# Patient Record
Sex: FEMALE | Race: BLACK OR AFRICAN AMERICAN | NOT HISPANIC OR LATINO | Employment: UNEMPLOYED | URBAN - METROPOLITAN AREA
[De-identification: names, ages, dates, MRNs, and addresses within clinical notes are randomized per-mention and may not be internally consistent; named-entity substitution may affect disease eponyms.]

---

## 2022-07-20 ENCOUNTER — HOSPITAL ENCOUNTER (EMERGENCY)
Facility: CLINIC | Age: 22
Discharge: HOME | End: 2022-07-20
Attending: PHYSICIAN ASSISTANT | Admitting: PHYSICIAN ASSISTANT
Payer: COMMERCIAL

## 2022-07-20 VITALS
HEART RATE: 87 BPM | OXYGEN SATURATION: 96 % | WEIGHT: 142.2 LBS | RESPIRATION RATE: 18 BRPM | DIASTOLIC BLOOD PRESSURE: 79 MMHG | TEMPERATURE: 97.8 F | SYSTOLIC BLOOD PRESSURE: 122 MMHG

## 2022-07-20 DIAGNOSIS — U07.1 INFECTION DUE TO 2019 NOVEL CORONAVIRUS: ICD-10-CM

## 2022-07-20 LAB
FLUAV RNA SPEC QL NAA+PROBE: NEGATIVE
FLUBV RNA RESP QL NAA+PROBE: NEGATIVE
RSV RNA SPEC NAA+PROBE: NEGATIVE
SARS-COV-2 RNA RESP QL NAA+PROBE: POSITIVE

## 2022-07-20 PROCEDURE — 87637 SARSCOV2&INF A&B&RSV AMP PRB: CPT | Performed by: PHYSICIAN ASSISTANT

## 2022-07-20 PROCEDURE — 99283 EMERGENCY DEPT VISIT LOW MDM: CPT | Mod: CS

## 2022-07-20 ASSESSMENT — ENCOUNTER SYMPTOMS
WHEEZING: 0
SHORTNESS OF BREATH: 0
FEVER: 0
COUGH: 1
VOMITING: 0
HEADACHES: 1
NAUSEA: 0

## 2022-07-21 NOTE — ED PROVIDER NOTES
History     Chief Complaint:  Covid 19 Testing     HPI   Nelsy Quinones is a 21 year old female who presents with couple of days with cough and intermittent headache.  No fevers.  No shortness of breath.  No loss of sense of taste or smell.  She has been exposed to COVID-19.  She tested positive today with a home test.  She needs another test for her work.    ROS:  Review of Systems   Constitutional: Negative for fever.   HENT: Positive for congestion.    Respiratory: Positive for cough. Negative for shortness of breath and wheezing.    Gastrointestinal: Negative for nausea and vomiting.   Neurological: Positive for headaches.   All other systems reviewed and are negative.    Allergies:  No Known Allergies     Medications:      Keppra    Past Medical History:    Hx of [facility]    Social History:     Presents with her son to the ED    Physical Exam     Patient Vitals for the past 24 hrs:   BP Temp Temp src Pulse Resp SpO2 Weight   07/20/22 1956 122/79 97.8  F (36.6  C) Temporal 87 18 96 % 64.5 kg (142 lb 3.2 oz)        Physical Exam  General: Vitals reviewed  Neuro: Normal Mentation  Psych: Normal affect  Eyes: Nonicteric, noninjected, normal range of motion, PERRLA  Nose: mild congestion  Ears: Bilateral tympanic membranes are pearly gray without erythema, or bulging.  Canals are free of discharge.  TMs are intact.  Oropharynx: No erythema of the back of the throat, uvula midline, moist mucous membranes, no tonsillitis, no trismus.  Lungs:Bilateral breath sounds, Clear to auscultation, no wheezing, rhonchi, Rales, crackles.  Normal respiratory excursion.  Heart:Regular rate and rhythm without murmurs, rubs, gallops  Skin: Normal moisture and temperature.      Emergency Department Course     Imaging:  No orders to display      Laboratory:  Labs Ordered and Resulted from Time of ED Arrival to Time of ED Departure - No data to display     Procedures     Emergency Department Course:         Reviewed:  I  reviewed nursing notes, vitals and past medical history    Assessments/Consults:          Interventions:  Medications - No data to display     Disposition:  The patient was discharged to home.     Impression & Plan    CMS Diagnoses: None    Covid-19  Nelsy Quinones was evaluated during a global COVID-19 pandemic, which necessitated consideration that the patient might be at risk for infection with the SARS-CoV-2 virus that causes COVID-19.   Applicable protocols for evaluation were followed during the patient's care.   COVID-19 was considered as part of the patient's evaluation. The plan for testing is:  a test was obtained during this visit.    Medical Decision Making:    This is a very pleasant and well appearing 21 year old female who presents with history and exam consistent with acute upper respiratory infection. Home test already positive for COVID 19.  There is no evidence of acute otitis media.  There is no significant tachypnea, increased work of breathing, focal exam findings, or persistent symptoms to suggest pneumonia; I do not believe imaging is indicated at this time.  The patient is well-hydrated, well-appearing, and I believe is safe for discharge with plan for supportive care.  As there is no bacterial infection identified, no antibiotic will be prescribed at this time.  The patient may take Tylenol or ibuprofen for pain and fever, and I discussed supportive measures such as decongestants.  Return if increasing pain, fever, difficulty breathing, vomiting, or any other concerns.  Follow-up with primary physician in 3-5 days.      My impression of today's diagnosis is:     ICD-10-CM    1. Infection due to 2019 novel coronavirus  U07.1        Discharge Medications:  New Prescriptions    No medications on file        7/20/2022   Nolan Robert PA-C Kruger, Jacob C, PA-C  07/21/22 1037

## 2022-07-21 NOTE — ED NOTES
Respiratory (Adult) - Airway WDL:  (pt comes in to have a covid test after having a positive covid test at home. congestion, coughm sob, weakness, pt works at a .)

## 2022-07-31 ENCOUNTER — HOSPITAL ENCOUNTER (EMERGENCY)
Facility: CLINIC | Age: 22
Discharge: PSYCHIATRIC OR SUBSTANCE USE FACILITY | End: 2022-08-02
Attending: EMERGENCY MEDICINE | Admitting: EMERGENCY MEDICINE
Payer: COMMERCIAL

## 2022-07-31 DIAGNOSIS — R45.851 SUICIDAL IDEATION: ICD-10-CM

## 2022-07-31 DIAGNOSIS — F32.A DEPRESSION, UNSPECIFIED DEPRESSION TYPE: ICD-10-CM

## 2022-07-31 LAB
ALBUMIN SERPL BCG-MCNC: 4.7 G/DL (ref 3.5–5.2)
ALP SERPL-CCNC: 61 U/L (ref 35–104)
ALT SERPL W P-5'-P-CCNC: 11 U/L (ref 10–35)
ANION GAP SERPL CALCULATED.3IONS-SCNC: 10 MMOL/L (ref 7–15)
APAP SERPL-MCNC: 45.7 UG/ML (ref 10–30)
AST SERPL W P-5'-P-CCNC: 18 U/L (ref 10–35)
BASOPHILS # BLD AUTO: 0 10E3/UL (ref 0–0.2)
BASOPHILS NFR BLD AUTO: 1 %
BILIRUB SERPL-MCNC: 0.4 MG/DL
BUN SERPL-MCNC: 12.4 MG/DL (ref 6–20)
CALCIUM SERPL-MCNC: 9.3 MG/DL (ref 8.6–10)
CHLORIDE SERPL-SCNC: 104 MMOL/L (ref 98–107)
CREAT SERPL-MCNC: 0.76 MG/DL (ref 0.51–0.95)
DEPRECATED HCO3 PLAS-SCNC: 27 MMOL/L (ref 22–29)
EOSINOPHIL # BLD AUTO: 0.1 10E3/UL (ref 0–0.7)
EOSINOPHIL NFR BLD AUTO: 2 %
ERYTHROCYTE [DISTWIDTH] IN BLOOD BY AUTOMATED COUNT: 14.8 % (ref 10–15)
GFR SERPL CREATININE-BSD FRML MDRD: >90 ML/MIN/1.73M2
GLUCOSE SERPL-MCNC: 102 MG/DL (ref 70–99)
HCG SERPL QL: NEGATIVE
HCT VFR BLD AUTO: 39.8 % (ref 35–47)
HGB BLD-MCNC: 11.8 G/DL (ref 11.7–15.7)
IMM GRANULOCYTES # BLD: 0 10E3/UL
IMM GRANULOCYTES NFR BLD: 0 %
LYMPHOCYTES # BLD AUTO: 1.8 10E3/UL (ref 0.8–5.3)
LYMPHOCYTES NFR BLD AUTO: 34 %
MCH RBC QN AUTO: 24.9 PG (ref 26.5–33)
MCHC RBC AUTO-ENTMCNC: 29.6 G/DL (ref 31.5–36.5)
MCV RBC AUTO: 84 FL (ref 78–100)
MONOCYTES # BLD AUTO: 0.5 10E3/UL (ref 0–1.3)
MONOCYTES NFR BLD AUTO: 10 %
NEUTROPHILS # BLD AUTO: 2.8 10E3/UL (ref 1.6–8.3)
NEUTROPHILS NFR BLD AUTO: 53 %
NRBC # BLD AUTO: 0 10E3/UL
NRBC BLD AUTO-RTO: 0 /100
PLAT MORPH BLD: NORMAL
PLATELET # BLD AUTO: 152 10E3/UL (ref 150–450)
POTASSIUM SERPL-SCNC: 3.5 MMOL/L (ref 3.4–5.3)
PROT SERPL-MCNC: 7.9 G/DL (ref 6.4–8.3)
RBC # BLD AUTO: 4.73 10E6/UL (ref 3.8–5.2)
RBC MORPH BLD: NORMAL
SODIUM SERPL-SCNC: 141 MMOL/L (ref 136–145)
WBC # BLD AUTO: 5.3 10E3/UL (ref 4–11)

## 2022-07-31 PROCEDURE — 80179 DRUG ASSAY SALICYLATE: CPT | Performed by: EMERGENCY MEDICINE

## 2022-07-31 PROCEDURE — 80053 COMPREHEN METABOLIC PANEL: CPT | Performed by: EMERGENCY MEDICINE

## 2022-07-31 PROCEDURE — 85025 COMPLETE CBC W/AUTO DIFF WBC: CPT | Performed by: EMERGENCY MEDICINE

## 2022-07-31 PROCEDURE — 93005 ELECTROCARDIOGRAM TRACING: CPT

## 2022-07-31 PROCEDURE — 99285 EMERGENCY DEPT VISIT HI MDM: CPT | Mod: 25

## 2022-07-31 PROCEDURE — 250N000013 HC RX MED GY IP 250 OP 250 PS 637: Performed by: EMERGENCY MEDICINE

## 2022-07-31 PROCEDURE — 80143 DRUG ASSAY ACETAMINOPHEN: CPT | Performed by: EMERGENCY MEDICINE

## 2022-07-31 PROCEDURE — 36415 COLL VENOUS BLD VENIPUNCTURE: CPT | Performed by: EMERGENCY MEDICINE

## 2022-07-31 PROCEDURE — 84703 CHORIONIC GONADOTROPIN ASSAY: CPT | Performed by: EMERGENCY MEDICINE

## 2022-07-31 RX ADMIN — ACTIVATED CHARCOAL 50 G: 208 SUSPENSION ORAL at 22:46

## 2022-08-01 ENCOUNTER — TELEPHONE (OUTPATIENT)
Dept: BEHAVIORAL HEALTH | Facility: CLINIC | Age: 22
End: 2022-08-01

## 2022-08-01 LAB
AMPHETAMINES UR QL SCN: ABNORMAL
APAP SERPL-MCNC: 39.9 UG/ML (ref 10–30)
BARBITURATES UR QL SCN: ABNORMAL
BENZODIAZ UR QL SCN: ABNORMAL
BZE UR QL SCN: ABNORMAL
CANNABINOIDS UR QL SCN: ABNORMAL
OPIATES UR QL SCN: ABNORMAL
SALICYLATES SERPL-MCNC: <0.5 MG/DL
SALICYLATES SERPL-MCNC: <0.5 MG/DL

## 2022-08-01 PROCEDURE — 36415 COLL VENOUS BLD VENIPUNCTURE: CPT | Performed by: EMERGENCY MEDICINE

## 2022-08-01 PROCEDURE — 250N000013 HC RX MED GY IP 250 OP 250 PS 637: Performed by: EMERGENCY MEDICINE

## 2022-08-01 PROCEDURE — 90791 PSYCH DIAGNOSTIC EVALUATION: CPT

## 2022-08-01 PROCEDURE — 80307 DRUG TEST PRSMV CHEM ANLYZR: CPT | Performed by: EMERGENCY MEDICINE

## 2022-08-01 PROCEDURE — 80179 DRUG ASSAY SALICYLATE: CPT | Performed by: EMERGENCY MEDICINE

## 2022-08-01 PROCEDURE — 80143 DRUG ASSAY ACETAMINOPHEN: CPT | Performed by: EMERGENCY MEDICINE

## 2022-08-01 RX ORDER — LEVETIRACETAM 500 MG/1
1000 TABLET ORAL 2 TIMES DAILY
Status: DISCONTINUED | OUTPATIENT
Start: 2022-08-01 | End: 2022-08-02 | Stop reason: HOSPADM

## 2022-08-01 RX ORDER — HYDROXYZINE HYDROCHLORIDE 25 MG/1
25 TABLET, FILM COATED ORAL EVERY 4 HOURS PRN
Status: DISCONTINUED | OUTPATIENT
Start: 2022-08-01 | End: 2022-08-02 | Stop reason: HOSPADM

## 2022-08-01 RX ORDER — OLANZAPINE 5 MG/1
10 TABLET, ORALLY DISINTEGRATING ORAL 2 TIMES DAILY PRN
Status: DISCONTINUED | OUTPATIENT
Start: 2022-08-01 | End: 2022-08-02 | Stop reason: HOSPADM

## 2022-08-01 RX ORDER — IBUPROFEN 600 MG/1
600 TABLET, FILM COATED ORAL EVERY 6 HOURS PRN
Status: DISCONTINUED | OUTPATIENT
Start: 2022-08-01 | End: 2022-08-02 | Stop reason: HOSPADM

## 2022-08-01 RX ORDER — ONDANSETRON 2 MG/ML
4 INJECTION INTRAMUSCULAR; INTRAVENOUS ONCE
Status: DISCONTINUED | OUTPATIENT
Start: 2022-08-01 | End: 2022-08-02 | Stop reason: HOSPADM

## 2022-08-01 RX ORDER — LEVETIRACETAM 500 MG/1
1000 TABLET ORAL 2 TIMES DAILY
COMMUNITY

## 2022-08-01 RX ORDER — LORAZEPAM 1 MG/1
1 TABLET ORAL EVERY 8 HOURS PRN
Status: DISCONTINUED | OUTPATIENT
Start: 2022-08-01 | End: 2022-08-02 | Stop reason: HOSPADM

## 2022-08-01 RX ADMIN — LEVETIRACETAM 1000 MG: 500 TABLET, FILM COATED ORAL at 22:23

## 2022-08-01 RX ADMIN — LEVETIRACETAM 1000 MG: 500 TABLET, FILM COATED ORAL at 09:43

## 2022-08-01 ASSESSMENT — ENCOUNTER SYMPTOMS: DYSPHORIC MOOD: 1

## 2022-08-01 NOTE — ED NOTES
Pt changed into behavioral scrubs. Clothing and shoe bagged and placed in pt belonging bag (1). Bag placed in lock room in green pod. Pt request if she can have her phone and head phones to listen to music. Writer informs pt that writer will allow the pt to do so as long as the pt is willing to cooperative with staff and it does not cause any disruption.     Pt is agreeable to plan.   at bedside.

## 2022-08-01 NOTE — TELEPHONE ENCOUNTER
S:  8 AM  Call from DEC  at [facility] ED requesting IP MH placement for a 21 YO F    B:  Hx of MDD, BIB EMS  after intentional SA via ingestion of 11 tabs of 500mg tylenol. 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 Denies previous SA.  No previous psychiatric hosp. no prior hx of OP MH services   Reports No CD hx or use    Utox-POS Cannibaboids  Acetamin level 8-1-22 1:39AM  39.9(H)  VSS    Medical:  COVID recovered, seizures-takes Keppra well managed no seizures for 2-3 years    A:  Voluntary-[facility]    R:  Patient cleared and ready for behavioral bed placement: Yes     R:  8:30 AM  Call to IP-M left requesting call back re:  COVID status clarification.    R:  9:20 AM  Call from IP, patient flagged changed to COVID recovered.

## 2022-08-01 NOTE — ED NOTES
"Pt asking for food and water. States that she is feeling nauseated after drinking activated charcoal. Dr. Soares made aware and Zofran ordered for the pt.     Writer offers Zofran for the pt. Pt states, \"I haven't eaten anything yet this morning. I think that can also be what is causing my stomach to hurt and feeling nauseous.\". MD made aware and OK for pt to eat/drink.    Cold lunch box given to the pt.   at bedside.  "

## 2022-08-01 NOTE — ED PROVIDER NOTES
"  History   Chief Complaint:  Suicide Attempt and Ingestion       The history is provided by the patient. History limited by: Psychiatric disorder.      Nelsy Quinones is a 22 year old female  who presents after taking 11 tablets of 500 mg tylenol at around 2130. She did not take anything else besides tylenol. Patient has been feeling down/ depressed for more than a week. She is supposed to take Keppra twice a day and took it today. She denies any history of asthma or diabetes.    She reports being in quarantine for COVID for 1.5 weeks.  She felt she did not have anything to distract her and because she was feeling depressed, she took the medications. She called her mother after taking the meds and also told sister who she lives with. EMS was then called. The patient notes she has a 7 month old son who is with her sister right now.      Review of Systems   Unable to perform ROS: Psychiatric disorder   Psychiatric/Behavioral: Positive for dysphoric mood and suicidal ideas.       Allergies:  The patient denies any known allergies.    Medications:  Keppra    Past Medical History:     The patient denies any prior medical history.    Past Surgical History:    The patient denies any prior surgical history.    Family History:    The patient denies any prior family history.    Social History:  Patient came from home.  Patient is unaccompanied in the ED.      Physical Exam     Patient Vitals for the past 24 hrs:   BP Temp Temp src Pulse Resp SpO2 Height   08/01/22 0045 -- -- -- -- 22 100 % --   08/01/22 0015 -- -- -- 68 16 98 % --   07/31/22 2345 -- -- -- 73 16 99 % --   07/31/22 2215 -- -- -- -- -- 99 % --   07/31/22 2206 115/82 99.1  F (37.3  C) Oral 87 18 99 % 1.626 m (5' 4\")       Physical Exam    Constitutional: Vital signs reviewed as above.   Head: No external signs of trauma noted.  Eyes: Pupils are equal, round, and reactive to light.   Neck: No JVD noted  Cardiovascular: Normal rate, regular rhythm and " normal heart sounds.  No murmur heard. Equal B/L peripheral pulses.  Pulmonary/Chest: Effort normal and breath sounds normal. No respiratory distress. Patient has no wheezes. Patient has no rales.   Gastrointestinal: Soft. There is no tenderness on my exam.   Musculoskeletal/Extremities: No edema noted. Normal tone.  Neurological: Patient is alert and oriented to person, place, and time.   Skin: Skin is warm and dry. There is no diaphoresis noted.   Psychiatric: The patient appears tearful and depressed with a flat affect and poor eye contact and interaction. She is very soft spoken, but mostly nods or shakes her head. She will not answer some questions.            Emergency Department Course   ECG  ECG obtained at 2306, ECG read at 2322  Sinus bradycardia with sinus arrhythmia  Nonspecific T wave abnormality  Abnormal ECG  Rate 59 bpm. WY interval 136 ms. QRS duration 92 ms. QT/QTc 416/411 ms. P-R-T axes 51 56 41.    Laboratory:  Labs Ordered and Resulted from Time of ED Arrival to Time of ED Departure   COMPREHENSIVE METABOLIC PANEL - Abnormal       Result Value    Sodium 141      Potassium 3.5      Creatinine 0.76      Urea Nitrogen 12.4      Chloride 104      Carbon Dioxide (CO2) 27      Anion Gap 10      Glucose 102 (*)     Calcium 9.3      Protein Total 7.9      Albumin 4.7      Bilirubin Total 0.4      Alkaline Phosphatase 61      AST 18      ALT 11      GFR Estimate >90     ACETAMINOPHEN LEVEL - Abnormal    Acetaminophen 45.7 (*)    CBC WITH PLATELETS AND DIFFERENTIAL - Abnormal    WBC Count 5.3      RBC Count 4.73      Hemoglobin 11.8      Hematocrit 39.8      MCV 84      MCH 24.9 (*)     MCHC 29.6 (*)     RDW 14.8      Platelet Count 152      % Neutrophils 53      % Lymphocytes 34      % Monocytes 10      % Eosinophils 2      % Basophils 1      % Immature Granulocytes 0      NRBCs per 100 WBC 0      Absolute Neutrophils 2.8      Absolute Lymphocytes 1.8      Absolute Monocytes 0.5      Absolute Eosinophils  0.1      Absolute Basophils 0.0      Absolute Immature Granulocytes 0.0      Absolute NRBCs 0.0     ACETAMINOPHEN LEVEL - Abnormal    Acetaminophen 39.9 (*)    DRUG ABUSE SCREEN 1 URINE (ED) - Abnormal    Amphetamines Urine Screen Negative      Barbituates Urine Screen Negative      Benzodiazepine Urine Screen Negative      Cannabinoids Urine Screen Positive (*)     Cocaine Urine Screen Negative      Opiates Urine Screen Negative     HCG QUALITATIVE PREGNANCY - Normal    hCG Serum Qualitative Negative     SALICYLATE LEVEL - Normal    Salicylate <0.5     SALICYLATE LEVEL - Normal    Salicylate <0.5     RBC AND PLATELET MORPHOLOGY    Platelet Assessment        Value: Automated Count Confirmed. Platelet morphology is normal.    RBC Morphology Confirmed RBC Indices          Procedures      Emergency Department Course:    Mental Health Risk Assessment      PSS-3    Date and Time Over the past 2 weeks have you felt down, depressed, or hopeless? Over the past 2 weeks have you had thoughts of killing yourself? Have you ever attempted to kill yourself? When did this last happen? User   07/31/22 2211 yes yes yes within the last 24 hours (including today) CT      C-SSRS ([place])    Date and Time Q1 Wished to be Dead (Past Month) Q2 Suicidal Thoughts (Past Month) Q3 Suicidal Thought Method Q4 Suicidal Intent without Specific Plan Q5 Suicide Intent with Specific Plan Q6 Suicide Behavior (Lifetime) Within the Past 3 Months? RETIRED: Level of Risk per Screen Screening Not Complete User   07/31/22 2211 yes yes yes no yes no -- -- -- CT               Mental health assessment completed by DEC        Reviewed:  I reviewed nursing notes, vitals, past medical history and Care Everywhere    Assessments/consults:  ED Course as of 08/01/22 0603   Sun Jul 31, 2022 2209 Obtained history and examined the patient as noted above.     2216 D/W [place] PCC. Get Tylenol level at 0130, if greater than 150, treat.   Mon Aug 01, 2022   0550 Rechecked.        Interventions:  2246 Charcoal 50 g  PO    Disposition:  Care of the patient was transferred to my colleague Dr. Dong pending DEC assessment.     Impression & Plan     CMS Diagnoses: None    Medical Decision Making:    This 22-year-old female patient presents to the ED after a Tylenol ingestion at approximately 9:30 PM.  Please see the HPI and exam for specifics.  The patient was tearful and did not want to interact very much.      A broad differential was considered and coingestants were considered along with Tylenol.  Tylenol and aspirin levels were obtained 4 hours after the reported ingestion.  Other laboratories were also obtained. Results are, fortunately, notable for a nonelevated Tylenol level.     The patient was eventually felt to be medically stable and was able to be evaluated by mental health. She was signed over to my colleague, Dr. Grider, for disposition pending DEC evaluation.       Diagnosis:    ICD-10-CM    1. Suicidal ideation  R45.851    2. Depression, unspecified depression type  F32.A        Discharge Medications:  New Prescriptions    No medications on file       Scribe Disclosure:  I, [place], am serving as a scribe at 10:09 PM on 7/31/2022 to document services personally performed by Kirill Soares DO based on my observations and the provider's statements to me.            Kirill Soares DO  08/01/22 0603

## 2022-08-01 NOTE — SAFE
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 Guardianship paperwork is not required.    This patient is a child/adolescent: No    This patient has additional special visitor precautions: No    Updated care team: Yes:     For additional details see full LMHP assessment.       Álvaro Yousif, LICSW

## 2022-08-01 NOTE — ED NOTES
Pt is ok for giving out information/update to her mother, Soheila if she calls.    Soheila's number - [phone]

## 2022-08-01 NOTE — ED TRIAGE NOTES
Pt arrives via EMS. EMS reports that pt took 11 pills of tylenol today at 2120. Pt comes with EMS to ER voluntarily.      Triage Assessment     Row Name 07/31/22 2202       Triage Assessment (Adult)    Airway WDL WDL       Respiratory WDL    Respiratory WDL WDL       Skin Circulation/Temperature WDL    Skin Circulation/Temperature WDL WDL       Cardiac WDL    Cardiac WDL WDL       Peripheral/Neurovascular WDL    Peripheral Neurovascular WDL WDL       Cognitive/Neuro/Behavioral WDL    Cognitive/Neuro/Behavioral WDL X;mood/behavior    Orientation oriented x 4    Mood/Behavior calm;cooperative;flat affect;other (see comments)  Refusing to answer questions at times. Nods yes or no.       Shirley Coma Scale    Best Eye Response 4-->(E4) spontaneous    Best Motor Response 6-->(M6) obeys commands    Best Verbal Response 5-->(V5) oriented    Shirley Coma Scale Score 15

## 2022-08-01 NOTE — ED NOTES
"Upon arrival pt was unwilling to talk/answer questions from staff/MD. Pt only nodding head to yes and no questions. Writer explains to pt that for writer and ER staff to be able to help the pt we need information of what happened that led to pt taking the tylenol tablets. Writer also lets the pt know that this is a safe place to talk about her feelings and that all the staff here wants to help her. Pt tearful but nods her head yes.    Writer noted that pt had wrist bands on from going to an event and asked pt where the pt went. Pt starts sharing with writer that she celebrated her birthday this past Saturday. 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    Writer goes more indebt with the pt about what led her to today's decision.    Pt reports to writer that she has been stressed with work and life in general. Pt states that she and her sister live together in an apartment with pt's 7 month old son. Pt states that she helps to pay the bills. Pt reports that she and her son both had covid on 7/20. Pt states she was on quarantine due to having covid. This led her to become isolated and \"had too much time to think about stress in my life and other stuff in general\". She is usually able to keep herself occupied with work and then bathing her baby at night. Pt admits that she has been thinking about this plan for awhile but never acted on it until today. Pt also shares that she is suppose to go back to work this Monday.    Pt reports that after she took the tylenols she called her mother to let her know what happened. Pt's mother and sister called for EMS and pt was transported here.    Pt is pleasant, calm, and cooperative. Pt able to smile and joke around with writer and  at bedside.    "

## 2022-08-01 NOTE — CONSULTS
Diagnostic Evaluation Consultation  Crisis Assessment    Patient was assessed: remote  Patient location: [facility]  Was a release of information signed: No. Reason: declined      Referral Data and Chief Complaint  Patient  is a 22 year old, who uses she/her pronouns, and presents to the ED alone. Patient is referred to the ED by self. Patient is presenting to the ED for the following concerns: attempted overdose via tylenol ingestion.      Informed Consent and Assessment Methods     Patient is her own guardian. Writer met with patient and explained the crisis assessment process, including applicable information disclosures and limits to confidentiality, assessed understanding of the process, and obtained consent to proceed with the assessment. Patient was observed to be able to participate in the assessment as evidenced by her ability to speak. Assessment methods included conducting a formal interview with patient, review of medical records, collaboration with medical staff, and obtaining relevant collateral information from family and community providers when available..     Over the course of this crisis assessment provided reassurance, offered validation and engaged patient in problem solving and disposition planning. Patient's response to interventions was positive     Summary of Patient Situation  Patient presents as being alert, orientated, and calm throughout. She states that she has been experiencing increased depression for the past month due to multiple stressors related to being a single mother, having her hours reduced at work, financial struggles, and a potential eviction. This suicide attempt appears to be impulsive, and patient shared that she has limited supports beyond her sister, who she lives with.     Brief Psychosocial History  Lives with sister, works for a  center, and denies having any legal, cultural or spiritual factors impacting her mental health at this time.     Significant  Clinical History  Patient states she has suffered from depression intermittently since her late teens, and typically these sx have presented as being flat affect, low motivation and sadness. She denies having any prior inpatient or outpatient providers/supports as well as commitments.      Collateral Information  DEC attempted to call patient's sister and left a message     Risk Assessment  ESS-6  1.a. Over the past 2 weeks, have you had thoughts of killing yourself? Yes  1.b. Have you ever attempted to kill yourself and, if yes, when did this last happen? Yes earlier today   2. Recent or current suicide plan? Yes overdose   3. Recent or current intent to act on ideation? Yes  4. Lifetime psychiatric hospitalization? No  5. Pattern of excessive substance use? No  6. Current irritability, agitation, or aggression? No  Scoring note: BOTH 1a and 1b must be yes for it to score 1 point, if both are not yes it is zero. All others are 1 point per number. If all questions 1a/1b - 6 are no, risk is negligible. If one of 1a/1b is yes, then risk is mild. If either question 2 or 3, but not both, is yes, then risk is automatically moderate regardless of total score. If both 2 and 3 are yes, risk is automatically high regardless of total score.      Score: 3, high risk      Does the patient have access to lethal means? Yes - describe OTC medications     Does the patient engage in non-suicidal self-injurious behavior (NSSI/SIB)? no     Does the patient have thoughts of harming others? No     Is the patient engaging in sexually inappropriate behavior?  no        Current Substance Abuse     Is there recent substance abuse? no     Was a urine drug screen or blood alcohol level obtained: Yes pending       Mental Status Exam     Affect: Flat   Appearance: Appropriate    Attention Span/Concentration: Attentive  Eye Contact: Variable   Fund of Knowledge: Appropriate    Language /Speech Content: Fluent   Language /Speech Volume: Soft     Language /Speech Rate/Productions: Pressured    Recent Memory: Variable   Remote Memory: Variable   Mood: Apathetic, Depressed and Sad    Orientation to Person: Yes    Orientation to Place: Yes   Orientation to Time of Day: Yes    Orientation to Date: Yes    Situation (Do they understand why they are here?): Yes    Psychomotor Behavior: Underactive    Thought Content: Suicidal   Thought Form: Goal Directed      History of commitment: No    Medication    Psychotropic medications: No  Medication changes made in the last two weeks: No       Current Care Team    Primary Care Provider: No  Psychiatrist: No  Therapist: No  : No  CTSS or ARMHS: No  ACT Team: No  Other: No      Diagnosis    296.33 (F33.2) Major Depressive Disorder, Recurrent Episode, Severe _ and With anxious distress     Clinical Summary and Substantiation of Recommendations    Patient is a 22 yr old female who seeks medical care following a suicide attempt via ingestion. She is medically cleared at this time and endorses multiple stressors contributing to this attempt. She offers flat affect, intermittent eye contact, and inability to contract for safety at this time due to her current state. She also does not offer explicit remorse for this attempt, and as a mother of a seven month old baby with minimal supports, outpatient planning at this time was agreed upon as not being a viable option with the medical team.  Disposition    Recommended disposition: Inpatient Mental Health       Reviewed case and recommendations with attending provider. Attending Name: Dr. Grider       Attending concurs with disposition: Yes       Patient concurs with disposition: Yes, but would like to consult with her mother and sister     Guardian concurs with disposition: No      Final disposition: Inpatient mental health .     Inpatient Details (if applicable):  Is patient admitted voluntarily:Yes, at this time      Patient aware of potential for transfer if there  is not appropriate placement? Yes       [facility]      Behavioral Intake Notified? Yes: Date: 8/1/22 Time: 8:00am    Assessment Details    Patient interview started at: 7:45am and completed at: 8:30am.     Total duration spent on the patient case in minutes: 1.50 hrs      CPT code(s) utilized: 36286 - Psychotherapy for Crisis - 60 (30-74*) min       Álvaro Yousif, LICSW, MSW, LICSW  DEC - Triage & Transition Services

## 2022-08-01 NOTE — ED PROVIDER NOTES
Patient signed out to me by Dr. Soares.  Please see initial provider note for full details.  In brief, patient presents to the ER after taking 11 tablets of 500 mg Tylenol at 2130.  She is status post activated charcoal.  Toxic/metabolic work-up is reassuring, Tylenol level at 4 hours is below toxic ingestion.  She is medically cleared and is pending DEC evaluation for final disposition peer    0749  -  I spoke with DEC.  Recommends inpatient admission.      Bed search continues for behavioral health admission.  LUIS .  Patient placed on 72-hour hold.  No behavioral health concerns during my shift.    Gaetano Grider MD  Emergency Medicine      Marni, Gaetano Rojo MD  [phone]

## 2022-08-01 NOTE — PHARMACY-ADMISSION MEDICATION HISTORY
Admission medication history interview status for this patient is complete. See [facility] admission navigator for allergy information, prior to admission medications and immunization status.     Medication history interview done, indicate source(s): Patient  Medication history resources (including written lists, pill bottles, clinic record):None    Changes made to PTA medication list:  Added: keppra  Changed: none  Reported as Not Taking: none  Removed: none    Actions taken by pharmacist (provider contacted, etc):None     Additional medication history information:None    Medication reconciliation/reorder completed by provider prior to medication history?  N   (Y/N)     Prior to Admission medications    Medication Sig Last Dose Taking? Auth Provider Long Term End Date   levETIRAcetam (KEPPRA) 500 MG tablet Take 1,000 mg by mouth 2 times daily 7/31/2022 at am Yes Unknown, Entered By History Yes

## 2022-08-02 ENCOUNTER — HOSPITAL ENCOUNTER (INPATIENT)
Facility: CLINIC | Age: 22
LOS: 1 days | Discharge: HOME | End: 2022-08-03
Attending: PSYCHIATRY & NEUROLOGY | Admitting: PSYCHIATRY & NEUROLOGY
Payer: COMMERCIAL

## 2022-08-02 VITALS
HEART RATE: 70 BPM | BODY MASS INDEX: 24.41 KG/M2 | OXYGEN SATURATION: 100 % | SYSTOLIC BLOOD PRESSURE: 103 MMHG | DIASTOLIC BLOOD PRESSURE: 73 MMHG | RESPIRATION RATE: 18 BRPM | TEMPERATURE: 99.1 F | HEIGHT: 64 IN

## 2022-08-02 PROBLEM — T14.91XA SUICIDE ATTEMPT (H): Status: ACTIVE | Noted: 2022-08-02

## 2022-08-02 PROCEDURE — 124N000002 HC R&B MH UMMC

## 2022-08-02 PROCEDURE — H2032 ACTIVITY THERAPY, PER 15 MIN: HCPCS

## 2022-08-02 PROCEDURE — 250N000013 HC RX MED GY IP 250 OP 250 PS 637: Performed by: EMERGENCY MEDICINE

## 2022-08-02 PROCEDURE — 99223 1ST HOSP IP/OBS HIGH 75: CPT | Mod: 95 | Performed by: PSYCHIATRY & NEUROLOGY

## 2022-08-02 PROCEDURE — 250N000013 HC RX MED GY IP 250 OP 250 PS 637: Performed by: PSYCHIATRY & NEUROLOGY

## 2022-08-02 PROCEDURE — G0177 OPPS/PHP; TRAIN & EDUC SERV: HCPCS

## 2022-08-02 RX ORDER — AMOXICILLIN 250 MG
1 CAPSULE ORAL 2 TIMES DAILY PRN
Status: DISCONTINUED | OUTPATIENT
Start: 2022-08-02 | End: 2022-08-03 | Stop reason: HOSPADM

## 2022-08-02 RX ORDER — HYDROXYZINE HYDROCHLORIDE 25 MG/1
25 TABLET, FILM COATED ORAL EVERY 4 HOURS PRN
Status: DISCONTINUED | OUTPATIENT
Start: 2022-08-02 | End: 2022-08-03 | Stop reason: HOSPADM

## 2022-08-02 RX ORDER — LEVETIRACETAM 500 MG/1
1000 TABLET ORAL 2 TIMES DAILY
Status: DISCONTINUED | OUTPATIENT
Start: 2022-08-02 | End: 2022-08-03 | Stop reason: HOSPADM

## 2022-08-02 RX ORDER — TRAZODONE HYDROCHLORIDE 50 MG/1
50 TABLET, FILM COATED ORAL
Status: DISCONTINUED | OUTPATIENT
Start: 2022-08-02 | End: 2022-08-03 | Stop reason: HOSPADM

## 2022-08-02 RX ADMIN — LEVETIRACETAM 1000 MG: 500 TABLET, FILM COATED ORAL at 07:49

## 2022-08-02 RX ADMIN — LEVETIRACETAM 1000 MG: 500 TABLET, FILM COATED ORAL at 20:09

## 2022-08-02 ASSESSMENT — ACTIVITIES OF DAILY LIVING (ADL)
ADLS_ACUITY_SCORE: 30
LAUNDRY: WITH SUPERVISION
ADLS_ACUITY_SCORE: 30
DRESS: INDEPENDENT
ADLS_ACUITY_SCORE: 30
ADLS_ACUITY_SCORE: 30
LAUNDRY: WITH SUPERVISION
HYGIENE/GROOMING: INDEPENDENT
ORAL_HYGIENE: INDEPENDENT
ADLS_ACUITY_SCORE: 30
ORAL_HYGIENE: INDEPENDENT
ADLS_ACUITY_SCORE: 30
DRESS: INDEPENDENT
HYGIENE/GROOMING: INDEPENDENT
ADLS_ACUITY_SCORE: 45
ADLS_ACUITY_SCORE: 30

## 2022-08-02 ASSESSMENT — LIFESTYLE VARIABLES
SKIP TO QUESTIONS 9-10: 1
AUDIT-C TOTAL SCORE: 1

## 2022-08-02 NOTE — PLAN OF CARE
Goal Outcome Evaluation:        Pt endorses depression. She denies anxiety, SI, SIB, HI, and AH/VH. She was isolative to her room for most of the shift resting. Provider requested pt received information about Lexapro. Pt expressed that she would not consider medications because of side effects other people she has known has experienced. She said she will not make another suicide attempt because she does not want to have activated charcoal again and because she feels like the hospital is boring. Pt asked when she will be able to leave and agreed to talk to the provider tomorrow.

## 2022-08-02 NOTE — PLAN OF CARE
Goal Outcome Evaluation:              08/02/22 0929   Patient Belongings   Did you bring any home meds/supplements to the hospital?  No   Patient Belongings locker   Belongings Search Yes   Clothing Search Yes   Second Staff Deqa      Underwears (3), Shoes (1), Pants (1), Shirt (1), Bras (2), Apple Airpods  with charging case (1 pair), Deodorant (1) iphone XR (1),  (1)Scarf (1) scrounges (1), ponytail holders (2),   A               Admission:  I am responsible for any personal items that are not sent to the safe or pharmacy.  [facility] is not responsible for loss, theft or damage of any property in my possession.    Signature:  _________________________________ Date: _______  Time: _____                                              Staff Signature:  ____________________________ Date: ________  Time: _____      2nd Staff person, if patient is unable/unwilling to sign:    Signature: ________________________________ Date: ________  Time: _____     Discharge:  [facility] has returned all of my personal belongings:    Signature: _________________________________ Date: ________  Time: _____                                          Staff Signature:  ____________________________ Date: ________  Time: _____

## 2022-08-02 NOTE — PROGRESS NOTES
Pt admitted to [facility] station 30 for suicide attempt. Pt is a 22 year old female who has history of intermittent depression. She was admitted for SA via tylenol which appears impulsive in nature. Pt was cooperative with initial search, orientation to the unit, and admission profile completion. Pt was found to be COVID negative in the ER and UTOX was positive for cannabis.     In the ED she reported suffering from depression intermittently since her late teens. She said these prevent as flat affect, low motivation, and sadness. She denies any outpatient or inpatient support.    Allergies: NKA  Legal status: voluntary; she was on a 72 hour hold in ED but agreed to stay until at least tomorrow    Current stressors: financial, reduced hours, potential eviction, limited social support, single mom.  Pt lives with her sister and works for a .    This is pt's first psychiatric hospitalization.     Precautions: suicide      Signed and Held orders were released.

## 2022-08-02 NOTE — PLAN OF CARE
Assessment/Intervention/Current Symtoms and Care Coordination:  Discussed discharge with pt. PT reports will talk to provider tomorrow and request discharge. Is interested in therapy. Is not interested in medication. Is not interested in a mother-baby program. Needs an evening appointment due to working from 8-6 but thinks could work out leaving work for a period of time for an intake.      Discharge Plan or Goal:  Pending stabilization & development of a safe discharge plan.  Considerations include: return home       Barriers to Discharge:  Patient requires further psychiatric stabilization due to current symptomology and Medication management with possible adjustments       Referral Status:  pending     Legal Status:  voluntary       Contacts:  Mother: Soheila: [phone]      Upcoming Meetings/Important Dates:  Pt is requesting discharge tomorrow

## 2022-08-02 NOTE — ED NOTES
"Triage & Transition Services, Extended Care     Therapy Progress Note    Patient: Nelsy goes by \"Nelsy,\" uses she/her pronouns  Date of Service: August 1, 2022  Site of Service: [facility] ED (28)   Patient was seen virtually ([employer] cart or other teleconferencing device).     Presenting problem:   Nelsy is followed related to Boarding Status. Please see initial DEC/[facility] Crisis Assessment completed by Álvaro Yousif on 08/01 for complete assessment information. Notable concerns include suicide attempt.     Individuals Present: Nelsy & Nora Hurtado MS    Session start: 6:40p  Session end: 6:50p   Session duration in minutes: 20  Session number: 1  Anticipated number of sessions or this episode of care: 1-4  CPT utilized: 00392 - Psychotherapy (with patient) - 30 (16-37*) min    Current Presentation:   Patient was sitting up in bed when writer arrived on the unit. Writer introduced self and explained role. Writer asked patient to explain, in their own words, what brought them to the hospital. Patient responded, \"oh some Tylenol I guess\". This response writer feels was indicitative of a sense of shame from overdose attempt. Writer probed further and asked patient to elaborate further, and patient admitted to overdose. Writer asked patient if there were any significant events or triggers that have occurred recently that lead to patient feeling this way, and patient explained that they are a single mom without much help or resources. Patient explained to writer that they are at risk of being evicted from their home, and have been looking for a better paying job but have been unsuccessful. Writer validated these frustrations and processed with patient that this isn't a reflection of them as a person or a mother, but of an entire system. Writer encouraged patient by processing how getting help for one's mental health is a sign of immense strength, and a sign of love for patient's child because patient is taking the " appropriate steps to make sure that they are getting the help they need which in turns means they will be able to continue to be there for their child. Patient was receptive. Writer also explained that while patient is in hospital and afterwards, care team will work with patient to make sure they are connected to the resources they need to be successful in the community. Patient thanked writer. Patient had questions about what inpatient mental health admission would look like such as having visitors and having access to their phone, and writer answered them to the best of their ability. Patient thanked writer.      Mental Status Exam:   Appearance: awake, alert  Attitude: cooperative  Eye Contact: good  Mood: good  Affect: appropriate and in normal range  Speech: clear, coherent  Psychomotor Behavior: no evidence of tardive dyskinesia, dystonia, or tics  Thought Process:  logical  Associations: no loose associations  Thought Content: passive suicidal ideation present  Insight: good  Judgement: intact  Oriented to: time, person, and place  Attention Span and Concentration: intact  Recent and Remote Memory: intact    Diagnosis:   296.33 (F33.2) Major Depressive Disorder, Recurrent Episode, Severe _ and With anxious distress     Therapeutic Intervention(s):   Provided active listening, unconditional positive regard, and validation. Engaged in safety planning.  Engaged in cognitive restructuring/ reframing, looked at common cognitive distortions and challenged negative thoughts. Engaged in guided discovery, explored patient's perspectives and helped expand them through socratic dialogue.    Treatment Objective(s) Addressed:   The focus of this session was on rapport building, assessing safety and building self-esteem.     Progress Towards Goals:   Patient reports stable symptoms. Patient is making progress towards treatment goals as evidenced by demonstrating insight towards stressors that resulted in overdose attempt.      General Recommendations:   Continue to monitor for harm. Consider: Consider 1:1 staffing, Allow family calls/visits and Listen in a neutral, non-judgmental way. Offer reassurance    Plan:   This writer is still recommending inpatient mental health treatment for patient. Patient will need safe space to process suicide attempt, and needs assistance learning about coping strategies to prevent further crisis events from happening in the future.        Nora Hurtado MS    Licensed Mental Health Professional (LMHP), [facility]  [phone]

## 2022-08-02 NOTE — H&P
"[facility], [place]   Psychiatric History and Physical  Admission date: 8/2/2022        Chief Complaint:   \"I overdosed.\"         HPI:     The patient is a 23yo female with a history of depression who was admitted after a suicide attempt via overdose of APAP. Reports that this was a suicide attempt but denies any SI currently. Reports feeling safe on the unit. Says \"I wish I didn't have to keep repeating it\" when asked about stressors that led up to this suicide attempt. Does admit to stress with work and her infant. Denies problems with sleep or appetite. Doesn't want to start any medications but is open to reading about options. Open to a day program. Agreeable to allow team to contact her sister and mother who she reports are good support.      Per ER:  Nelsy Quinones is a 22 year old female  who presents after taking 11 tablets of 500 mg tylenol at around 2130. She did not take anything else besides tylenol. Patient has been feeling down/ depressed for more than a week. She is supposed to take Keppra twice a day and took it today. She denies any history of asthma or diabetes.     She reports being in quarantine for COVID for 1.5 weeks.  She felt she did not have anything to distract her and because she was feeling depressed, she took the medications. She called her mother after taking the meds and also told sister who she lives with. EMS was then called. The patient notes she has a 7 month old son who is with her sister right now.        Past Psychiatric History:     Denies any previous suicide attempts. Never been on psychotropics.         Substance Use and History:     Denies alcohol or drug use.         Past Medical History:   PAST MEDICAL HISTORY: No past medical history on file.    PAST SURGICAL HISTORY: No past surgical history on file.          Family History:   FAMILY HISTORY: Reports depression in her mother. Not on medications.         Social History:   Please see " the full psychosocial profile from the clinical treatment coordinator.   SOCIAL HISTORY:   Social History     Tobacco Use     Smoking status: Not on file     Smokeless tobacco: Not on file   Substance Use Topics     Alcohol use: Not on file            Physical ROS:   The 10-point review of systems was negative except as noted in HPI.         PTA Medications:     Medications Prior to Admission   Medication Sig Dispense Refill Last Dose     levETIRAcetam (KEPPRA) 500 MG tablet Take 1,000 mg by mouth 2 times daily             Allergies:   No Known Allergies       Labs:     Recent Results (from the past 48 hour(s))   Comprehensive metabolic panel    Collection Time: 07/31/22 10:37 PM   Result Value Ref Range    Sodium 141 136 - 145 mmol/L    Potassium 3.5 3.4 - 5.3 mmol/L    Creatinine 0.76 0.51 - 0.95 mg/dL    Urea Nitrogen 12.4 6.0 - 20.0 mg/dL    Chloride 104 98 - 107 mmol/L    Carbon Dioxide (CO2) 27 22 - 29 mmol/L    Anion Gap 10 7 - 15 mmol/L    Glucose 102 (H) 70 - 99 mg/dL    Calcium 9.3 8.6 - 10.0 mg/dL    Protein Total 7.9 6.4 - 8.3 g/dL    Albumin 4.7 3.5 - 5.2 g/dL    Bilirubin Total 0.4 <=1.2 mg/dL    Alkaline Phosphatase 61 35 - 104 U/L    AST 18 10 - 35 U/L    ALT 11 10 - 35 U/L    GFR Estimate >90 >60 mL/min/1.73m2   HCG qualitative Blood    Collection Time: 07/31/22 10:37 PM   Result Value Ref Range    hCG Serum Qualitative Negative Negative   Acetaminophen level    Collection Time: 07/31/22 10:37 PM   Result Value Ref Range    Acetaminophen 45.7 (H) 10.0 - 30.0 ug/mL   Salicylate level    Collection Time: 07/31/22 10:37 PM   Result Value Ref Range    Salicylate <0.5   mg/dL   CBC with platelets and differential    Collection Time: 07/31/22 10:37 PM   Result Value Ref Range    WBC Count 5.3 4.0 - 11.0 10e3/uL    RBC Count 4.73 3.80 - 5.20 10e6/uL    Hemoglobin 11.8 11.7 - 15.7 g/dL    Hematocrit 39.8 35.0 - 47.0 %    MCV 84 78 - 100 fL    MCH 24.9 (L) 26.5 - 33.0 pg    MCHC 29.6 (L) 31.5 - 36.5 g/dL     "RDW 14.8 10.0 - 15.0 %    Platelet Count 152 150 - 450 10e3/uL    % Neutrophils 53 %    % Lymphocytes 34 %    % Monocytes 10 %    % Eosinophils 2 %    % Basophils 1 %    % Immature Granulocytes 0 %    NRBCs per 100 WBC 0 <1 /100    Absolute Neutrophils 2.8 1.6 - 8.3 10e3/uL    Absolute Lymphocytes 1.8 0.8 - 5.3 10e3/uL    Absolute Monocytes 0.5 0.0 - 1.3 10e3/uL    Absolute Eosinophils 0.1 0.0 - 0.7 10e3/uL    Absolute Basophils 0.0 0.0 - 0.2 10e3/uL    Absolute Immature Granulocytes 0.0 <=0.4 10e3/uL    Absolute NRBCs 0.0 10e3/uL   RBC and Platelet Morphology    Collection Time: 07/31/22 10:37 PM   Result Value Ref Range    Platelet Assessment  Automated Count Confirmed. Platelet morphology is normal.     Automated Count Confirmed. Platelet morphology is normal.    RBC Morphology Confirmed RBC Indices    Salicylate level    Collection Time: 08/01/22  1:39 AM   Result Value Ref Range    Salicylate <0.5   mg/dL   Acetaminophen level    Collection Time: 08/01/22  1:39 AM   Result Value Ref Range    Acetaminophen 39.9 (H) 10.0 - 30.0 ug/mL   Drug abuse screen 1 urine (ED)    Collection Time: 08/01/22  2:20 AM   Result Value Ref Range    Amphetamines Urine Screen Negative Screen Negative    Barbituates Urine Screen Negative Screen Negative    Benzodiazepine Urine Screen Negative Screen Negative    Cannabinoids Urine Screen Positive (A) Screen Negative    Cocaine Urine Screen Negative Screen Negative    Opiates Urine Screen Negative Screen Negative          Physical and Psychiatric Examination:     /74 (BP Location: Right arm)   Pulse 74   Temp 97.4  F (36.3  C) (Oral)   Resp 16   Ht (!) 0.137 m (5.4\")   SpO2 100%   BMI 3428.51 kg/m    Weight is 0 lbs 0 oz  Body mass index is 3,428.51 kg/m .    Physical Exam:  I have reviewed the physical exam as documented by the medical team and agree with findings and assessment and have no additional findings to add at this time.    Mental Status Exam:  Appearance: awake, " alert and adequately groomed  Attitude:  somewhat cooperative  Eye Contact:  fair  Mood:  depressed  Affect:  intensity is blunted  Speech:  paucity of speech  Language: fluent and intact in English  Psychomotor, Gait, Musculoskeletal:  no evidence of tardive dyskinesia, dystonia, or tics  Thought Process:  goal oriented  Associations:  no loose associations  Thought Content:  no evidence of suicidal ideation or homicidal ideation and no evidence of psychotic thought  Insight:  partial  Judgement:  fair  Oriented to:  time, person, and place  Attention Span and Concentration:  intact  Recent and Remote Memory:  intact  Fund of Knowledge:  appropriate         Admission Diagnoses:     MDD, recurrent, severe without psychosis         Assessment & Plan:     1) Patient signed in voluntarily.   2) Patient to be provided information on SSRIs, specifically Lexapro.   3) CTC to discuss options for day treatment with patient.   4) Team to connect with patient's mother and sister for collateral information.     Disposition Plan   Reason for ongoing admission: poses an imminent risk to self  Discharge location: home with family  Discharge Medications: not ordered  Follow-up Appointments: not scheduled  Legal Status: voluntary    Entered by: Ash Bergman MD on 8/2/2022 at 9:15 AM       Video-Visit Details    Type of service:  Video Visit    Video Start Time (time video started): 0940    Video End Time (time video stopped): 0955    Originating Location (pt. Location): Other Station 30    Distant Location (provider location): Provider remote location    Mode of Communication:  Video Conference via [employer]    Physician has received verbal consent for a Video Visit from the patient? Yes      Ash Bergman MD

## 2022-08-02 NOTE — PLAN OF CARE
INITIAL PSYCHOSOCIAL ASSESSMENT AND NOTE  I have reviewed the chart met with the patient, and developed Care Plan.  Information for assessment was obtained from: pt and chart  PRESENTING PROBLEM: Pt was admitted to station 30 on a voluntary basis due to suicide attempt by overdose on Tylenol (11 tabs of 500 mg). Pt reports stressors including money, potential eviction, single parenting, and limited social support. Pt had come to the ED alone after overdose. Pt reports was on quarantine when she had covid which led to isolating and increased depression.   The following areas have been assessed:  History of Mental Health and Chemical Dependency: This is pt's first inpatient  admit. Hx of intermittent depression since adolescence.   Substance use history: none  Living Situation: lives with sister and 7 month old son. Likely to have eviction filed 8/6 as owe rent for July and August ($1800 for each month including late fees). Pt had applied for emergency assistance 7/20 but hasn't received it. Both pt and sister were unable to work while they had covid and so had no income. Were already struggling with rent. Cannot move in with parents as they live in a 1 bedroom section 8 housing so can't have people stay.   Significant Life Events (Illness, Abuse, Trauma, Death): pt and son had covid 7/20; have recovered.   Family Description (Constellation, Family Psychiatric History): Pt has a 7 month old, primarily single parenting with minimal involvement by the dad. Pt lives with her sister and their parents live nearby and help out.   Parents never , pt has one full sibling, a sister who is older. An older paternal half-brother grew up with them. 2 younger paternal siblings have limited contact.   Mom has hx of depression  Educational Background: high school  Occupational History: works in a   Financial Status: works in a ; financial stressors; has [facility]  restrictions: none  Legal Issues:  none   Service History: none  Ethnic/Cultural/Spiritual considerations: none  Social Functioning (organizations, interests, support system):  Pt reports likes to write music, play piano, making dance tiktoks. Pt reports parents and sister are supportive but also have few resources.   Mother: Soheila: [phone]    Current Treatment providers:   none  Social Service Assessment/Plan:   Patient will have psychiatric assessment and medication management by the psychiatrist. Medications will be reviewed and adjusted per MD as indicated. The treatment team will continue to assess and stabilize the patient's mental health symptoms with the use of medications and therapeutic programming. Hospital staff will provide a safe environment and a therapeutic milieu. Staff will continue to assess patient as needed. Patient will be encouraged to participate in unit groups and activities. Patient will receive individual and group support on the unit.  CTC will do individual inpatient treatment planning and after care planning. CTC will discuss options for increasing community supports with the patient. CTC will coordinate with outpatient providers and will place referrals to ensure appropriate follow up care is in place.      Pt reports would prefer evening appointments as she works from 8-6 M-F.

## 2022-08-02 NOTE — PLAN OF CARE
08/02/22 1100   General Information   Date Initially Attended OT 08/02/22 08/02/22 1125   Group Therapy Session   Group Attendance attended group session   Time Session Began 1015   Time Session Ended 1115   Total Time patient participated (minutes) 55   Total # Attendees 4   Group Type Occupational Therapy   Group Topic Covered balanced lifestyle;coping skills/lifestyle management;leisure exploration/use of leisure time;relaxation techniques   Group Session Detail OT Clinic Group   Patient Response Detail Intervention: OT Clinic with 3 peers    Patient Response: Pt was oriented to OT Clinic group purpose/value and project options available, selecting a ceramics painting project to begin working on. Pt was soft spoken in interactions with writer, however was comfortable enough to request assistance finding needed supplies for project and assisting writer in cleanup at end of group. Pt however was otherwise quiet outside of these interactions with writer and focused on project. Pt completed project utilizing multiple colors and taking time to plan out in which areas of the project to use these colors. Pt worked on project for the duration of the group.     Cognition:  Goal directed, Follows through with task, Sequences task      Mood/Affect:  Soft spoken, Pleasant      Plan: Patient encouraged to maintain attendance for continued ongoing support in working towards occupational therapy goals to support overall treatment/care. More information is needed to complete initial OT assessment. As group attendance is established, OT will provide pt with self-assessment form to inform treatment planning/goal setting, and provide explanation of the benefit of participation in occupational therapy services to overall treatment/care during hospitalization.

## 2022-08-02 NOTE — ED NOTES
Administered pt's keppra prior to transfer. Belongings bag sent with EMS. Pt transferred to [facility]. Called [facility] at [phone] to inform them that pt is leaving [facility].

## 2022-08-02 NOTE — PLAN OF CARE
08/02/22 1410   Individualization/Patient Specific Goals   Patient Personal Strengths family/social support;resilient   Patient Vulnerabilities housing insecurity   Anxieties, Fears or Concerns Pt has ongoing financial and housing concerns   Individualized Care Needs Pt to stabilize on medication, decreased SI, safety for herself and baby   Patient-Specific Goals (Include Timeframe) pt to stabilize on medication, consider therapy   Interprofessional Rounds   Summary Pt newly admitted after reporting suicide attempt by overdose. Has ongoing stressors, depression increased while isolated during a quarantine when she recently had covid   Participants physician;CTC   Team Discussion   Participants Ash Bergman MD; Yanely Asif MS,LP   Progress Pt newly admitted, ongoing observation and evaluatoin   Anticipated length of stay 3-4 days   Continued Stay Criteria/Rationale pt has had recent suicide attempt   Medical/Physical post-partum 7 months   Plan Pt to stabilize on medication, attend groups, work with team on discharge plan   Rationale for change in precautions or plan no changes   Safety Plan per unit protocol   Anticipated Discharge Disposition home or self-care   PRECAUTIONS AND SAFETY    Behavioral Orders   Procedures    Code 1 - Restrict to Unit    Routine Programming     As clinically indicated    Status 15     Every 15 minutes.    Suicide precautions     Patients on Suicide Precautions should have a Combination Diet ordered that includes a Diet selection(s) AND a Behavioral Tray selection for Safe Tray - with utensils, or Safe Tray - NO utensils

## 2022-08-02 NOTE — ED NOTES
RN called patients pradeep Parnell to update her on patients transfer to [facility]. RN had to leave a message for a return call.

## 2022-08-03 VITALS
HEIGHT: 55 IN | DIASTOLIC BLOOD PRESSURE: 73 MMHG | OXYGEN SATURATION: 100 % | RESPIRATION RATE: 16 BRPM | SYSTOLIC BLOOD PRESSURE: 109 MMHG | WEIGHT: 143.5 LBS | BODY MASS INDEX: 33.21 KG/M2 | TEMPERATURE: 97.9 F | HEART RATE: 82 BPM

## 2022-08-03 PROCEDURE — 99239 HOSP IP/OBS DSCHRG MGMT >30: CPT | Performed by: PSYCHIATRY & NEUROLOGY

## 2022-08-03 PROCEDURE — 250N000013 HC RX MED GY IP 250 OP 250 PS 637: Performed by: PSYCHIATRY & NEUROLOGY

## 2022-08-03 RX ADMIN — LEVETIRACETAM 1000 MG: 500 TABLET, FILM COATED ORAL at 08:37

## 2022-08-03 ASSESSMENT — ACTIVITIES OF DAILY LIVING (ADL)
ADLS_ACUITY_SCORE: 30
HYGIENE/GROOMING: INDEPENDENT
DRESS: INDEPENDENT
ADLS_ACUITY_SCORE: 30
ADLS_ACUITY_SCORE: 30
LAUNDRY: WITH SUPERVISION
ADLS_ACUITY_SCORE: 30

## 2022-08-03 NOTE — PLAN OF CARE
Night Shift Summary (8/2/22 into 08/03/22)    Pt in bed sleeping at start of shift. Breathing quiet and unlabored. Appears to have slept 7 hours.    Pt has Suicide and Elopement precaution order(s); any related events noted above.     Will continue to monitor and assess.       Problem: Sleep Disturbance (Psychotic Signs/Symptoms)  Goal: Improved Sleep (Psychotic Signs/Symptoms)  Outcome: Ongoing, Progressing     Problem: Behavioral Health Plan of Care  Goal: Absence of New-Onset Illness or Injury  Outcome: Ongoing, Progressing

## 2022-08-03 NOTE — DISCHARGE INSTRUCTIONS
Behavioral Discharge Planning and Instructions    Summary:  You were admitted on 8/2/2022  due to Depression and Suicide Attempt.  You were treated by Dr. Ash Valdez MD and discharged on 8/3/2022 from [facility] to Home.    Primary Diagnoses:   Major Depressive Disorder, recurrent, severe without psychosis    Mental Health Follow-Up:  - We recommend that you follow-up with Individual Therapy.  Since you are not taking medications, we did not schedule Psychiatry for you.  We have listed shelter resources below if you need them and also     - Individual Therapy Follow-up  Appointment:  Friday, 8/5/2022 @ 11:00 am  w/ Seamus Breaux MA  Location: [facility], [address], [place] [address]  (this is a VIRTUAL APPT, I have provided them your email and phone number and you will be sent forms from them)  Phone: [phone]  Type: Teletherapy  Patient Instructions  To reduce no shows, we ask that patients call our office at [phone] and confirm their appointment and leave their email. We make effort to reach each client, but if we cannot reach them or they do not confirm appointment, the appointment will be removed. Please ask patients to leave a voicemail with their information.    - [facility] Housing Voucher program  To apply visit: [email or web address]  The [facility] is accepting applications for the Housing Choice Voucher Program (Section 8 rental assistance) waiting list.    Phone: [phone]    - [facility] SNAP  Food Programs  Renewal information for cash assistance & SNAP programs  The [place] Department of Human Services is resuming annual renewals and six-month reviews for cash assistance programs and the Supplemental Nutrition Assistance Program (SNAP). Cash Assistance programs include [place] Family Investment Program (MFIP), General Assistance (GA), [place] Supplemental Assistance (MSA) and Housing Support (GRH).     This process will start up  for households who have renewals due for June and will continue going forward each month.   Each month, the state will mail notices and paperwork to households that need to complete a renewal.  To streamline the process, DHS created a shorter Household Update Form (DHS-8107).    Renewals for [place] Health Care Programs are suspended. It has not been determined when they will resume.    If you have questions, call your financial worker or  Employment and Economic Assistance at [phone].  The [facility] lobby is open, but some in-person services may require an appointment. Please contact your worker directly or call [phone] to reach our office as needed.    Getting your forms  Forms are needed during the application, recertification and verification process.    Apply online  [place] website  Before applying, you can see if you are eligible for public assistance programs by using the Bridge to Benefits screening tool.  Access applications, renewals and verification forms online at the [place] Department of Human Services eDocs website.    Supplemental Nutrition Assistance Program and Cash Assistance  Supplemental Nutrition Assistance Program Application for Seniors (60+)  By mail  Request mailed forms by calling [phone] or [phone]  [place] [address]  Forms can be picked up between 8 a.m.-4:30 p.m.  Submitting forms  By email to [email or web address] or directly to your worker  By fax at [phone]  By delivering to drop box at the front of the [facility], [address] [place] [address]  By mail to [facility], [address] [place] [address], [place] [phone]   Your application will be assigned to a financial worker right away.  A financial worker will call you to complete a telephone interview.  Please answer all questions on the  application.    You can get help with your SNAP application (screening and application assistance) from [facility] at [phone].     - Shelter Information:  [facility] [place] Direct Line:  [phone]  HOURS:  Monday - Friday: 10:00 a.m. - 5:00 p.m.     Weekends and Holidays: 1:00 - 5:00 pm.  After hours:  [phone]    Location: [facility]: [address] [place] [address]  Google Map Directions    Individuals seeking shelter in [facility] need to visit the Adult Shelter Connect (ASC) for an assessment and placement at one of the [facility]. The ASC operates on behalf of the [facility] Adult Shelter Collaborative which is made up of the five agencies providing shelter to single individuals over age 18 in [facility].  The [facility] Adult Shelter Collaborative includes the following agencies: [employer], [employer], [place], [facility], and [facility].  [facility] oversees the day-to-day operations on behalf of the [facility] Shelter [facility].     Adult Shelter [facility] is funded by [facility] Human Services and Public Health Department.   Thank you to [facility] for providing the space.    The [facility] is located at: [address] [place] [address] and is open from 5:00 pm - 9:00 am daily. Reservations for shelter are required!     Attend all scheduled appointments with your outpatient providers. Call at least 24 hours in advance if you need to reschedule an appointment to ensure continued access to your outpatient providers.   Major Treatments, Procedures and Findings:  You were provided with: a psychiatric assessment, medication evaluation and/or management, group therapy and milieu management    Symptoms to Report: feeling more aggressive, increased confusion, losing more sleep, mood getting worse or thoughts of suicide    Early warning signs can include: increased depression or  "anxiety sleep disturbances increased thoughts or behaviors of suicide or self-harm  increased unusual thinking, such as paranoia or hearing voices    Safety and Wellness:  Take all medicines as directed.  Make no changes unless your doctor suggests them.      Follow treatment recommendations.  Refrain from alcohol and non-prescribed drugs.  Ask your support system to help you reduce your access to items that could harm yourself or others. Items could include:  Firearms  Medicines (both prescribed and over-the-counter)  Knives and other sharp objects  Ropes and like materials  Car keys  If there is a concern for safety, call 911. If there is a concern for safety, call 911.    Resources:   Crisis Intervention: 915.420.9095 or 278-660-4256 (TTY: 198.507.3197).  Call anytime for help.  National [place] on Mental Illness (www.[place].jarret.org): 333.200.7486 or 313-676-0106.  [place] Association for Children's Mental Health (www.mac.org): 219.264.3173.  Alcoholics Anonymous (www.alcoholics-anonymous.org): Check your phone book for your local chapter.  Suicide Awareness Voices of Education (SAVE) (www.save.org): 309-545-GALS (6032)  National Suicide Prevention Line (www.mentalhealthmn.org): 754-441-DDNA (4929)  Mental Health Consumer/Survivor Network of [place] (www.mhcsn.net): 975.521.4487 or 037-566-0336  Mental Health Association of [place] (www.mentalhealth.org): 714.995.9892 or 806-633-9305  Self- Management and Recovery Training., SMART-- Toll free: 439.692.1757  www.[employer].org  [facility] Crisis Response 125-872-4206  [employer] Crisis (COPE) Response - Adult 011 149-9350  [facility] Crisis Response - Adult [phone]  Text 4 Life: txt \"LIFE\" to [address]77 for immediate support and crisis intervention  Crisis text line: Text \"[place]\" to 280516. Free, confidential, 24/7.  Crisis Intervention: 753.356.3659 or 031-257-7670. Call anytime for help.   [place] Mobile Mental Health Crisis Team - Child: " 068-418-8450  [facility]'s Mental Health Crisis Response Team - Child: [phone]    The treatment team has appreciated the opportunity to work with you. If you have any questions or concerns our unit number is [phone].

## 2022-08-03 NOTE — PLAN OF CARE
Assessment/Intervention/Current Symtoms and Care Coordination  - Chart review  - Team Meeting   - AVS completed     - We recommend that you follow-up with Individual Therapy.  Since you are not taking medications, we did not schedule Psychiatry for you.  We have listed shelter resources below if you need them and also     Set up transportation through the pt's [employer] [place].  They will pick her up at 1:30pm and transport her home.      - Individual Therapy Follow-up  Appointment:  Friday, 8/5/2022 @ 11:00 am  w/ Seamus Breaux MA  Location: [facility], [address], [place] [address]  (this is a VIRTUAL APPT, I have provided them your email and phone number and you will be sent forms from them)  Phone: [phone]  Type: Teletherapy  Patient Instructions  To reduce no shows, we ask that patients call our office at [phone] and confirm their appointment and leave their email. We make effort to reach each client, but if we cannot reach them or they do not confirm appointment, the appointment will be removed. Please ask patients to leave a voicemail with their information.    - [facility] Housing Voucher program  To apply visit: [email or web address]  The [facility] is accepting applications for the Housing Choice Voucher Program (Section 8 rental assistance) waiting list.    Phone: [phone]    - [facility] SNAP  Food Programs  Renewal information for cash assistance & SNAP programs  The [place] Department of Human Services is resuming annual renewals and six-month reviews for cash assistance programs and the Supplemental Nutrition Assistance Program (SNAP). Cash Assistance programs include [place] Family Investment Program (MFIP), General Assistance (GA), [place] Supplemental Assistance (MSA) and Housing Support (GRH).     This process will start up for households who have renewals due for June and will continue going forward each month.   Each month, the  [facility] will mail notices and paperwork to households that need to complete a renewal.  To streamline the process, DHS created a shorter Household Update Form (DHS-9625).    Renewals for [place] Health Care Programs are suspended. It has not been determined when they will resume.    If you have questions, call your financial worker or  Employment and Economic Assistance at [phone].  The [facility] lobby is open, but some in-person services may require an appointment. Please contact your worker directly or call [phone] to reach our office as needed.    Getting your forms  Forms are needed during the application, recertification and verification process.    Apply online  [place] website  Before applying, you can see if you are eligible for public assistance programs by using the Bridge to Benefits screening tool.  Access applications, renewals and verification forms online at the [place] Department of Human Services eDocs website.    Supplemental Nutrition Assistance Program and Cash Assistance  Supplemental Nutrition Assistance Program Application for Seniors (60+)  By mail  Request mailed forms by calling 470-124-7577 or 1-333.832.5645  [place] [address]  Forms can be picked up between 8 a.m.-4:30 p.m.  Submitting forms  By email to [email or web address] or directly to your worker  By fax at [phone]  By delivering to drop box at the front of the [facility], [address] [place] [address]  By mail to [facility], [address] [place] [address], [place] [phone]   Your application will be assigned to a financial worker right away.  A financial worker will call you to complete a telephone interview.  Please answer all questions on the application.    You can get help with your SNAP application (screening and application assistance) from [employer] at [phone].     - Shelter Information:  [facility] [place] Direct Line:  [phone]  HOURS:  Monday - Friday: 10:00 a.m. - 5:00 p.m.     Weekends and Holidays: 1:00 - 5:00 pm.  After hours:  [phone]    Location: [facility]: [address] [place] [address]  Google Map Directions    Individuals seeking shelter in [facility] need to visit the Adult Shelter Connect (ASC) for an assessment and placement at one of the [facility]. The ASC operates on behalf of the [facility] Adult Shelter Collaborative which is made up of the five agencies providing shelter to single individuals over age 18 in [facility].  The [facility] Adult Shelter Collaborative includes the following agencies: [employer], [facility], [place], [facility] [employer], and [facility].  [facility] oversees the day-to-day operations on behalf of the [facility] Shelter [facility].     Adult Shelter [facility] is funded by [facility] Human Services and Public Health Department.   Thank you to [facility] for providing the space.    The [facility] is located at: [address] [place] [address] and is open from 5:00 pm - 9:00 am daily. Reservations for shelter are required!      Current Symptoms include the following: anxious    Discharge Plan or Goal  Pending stabilization & development of a safe discharge plan.  Considerations include: Return home with outpatient providers    Barriers to Discharge  Patient requires further psychiatric stabilization due to current symptomology    Referral Status  Therapy    Legal Status  Patient is voluntary

## 2022-08-03 NOTE — DISCHARGE SUMMARY
"Psychiatric Discharge Summary    Nelsy Quinones MRN# [id number]   Age: 22 year old YOB: 2000     Date of Admission:  8/2/2022  Date of Discharge:  8/3/2022  1:36 PM  Admitting Physician:  Ash Bergman MD  Discharge Physician:  Ash Bergman MD          Event Leading to Hospitalization:   The patient is a 23yo female with a history of depression who was admitted after a suicide attempt via overdose of APAP. Reports that this was a suicide attempt but denies any SI currently. Reports feeling safe on the unit. Says \"I wish I didn't have to keep repeating it\" when asked about stressors that led up to this suicide attempt. Does admit to stress with work and her infant. Denies problems with sleep or appetite. Doesn't want to start any medications but is open to reading about options. Open to a day program. Agreeable to allow team to contact her sister and mother who she reports are good support.         See Admission note by Ash Bergman MD for additional details.          Diagnoses:     MDD, recurrent, severe without psychosis                 Labs:        Lab Results   Component Value Date     07/31/2022    Lab Results   Component Value Date    CHLORIDE 104 07/31/2022    Lab Results   Component Value Date    BUN 12.4 07/31/2022      Lab Results   Component Value Date    POTASSIUM 3.5 07/31/2022    Lab Results   Component Value Date    CO2 27 07/31/2022    Lab Results   Component Value Date    CR 0.76 07/31/2022          Lab Results   Component Value Date    WBC 5.3 07/31/2022    HGB 11.8 07/31/2022    HCT 39.8 07/31/2022    MCV 84 07/31/2022     07/31/2022     Lab Results   Component Value Date    AST 18 07/31/2022    ALT 11 07/31/2022    ALKPHOS 61 07/31/2022    BILITOTAL 0.4 07/31/2022     No results found for: TSH         Consults:   No consultations were requested during this admission         Hospital Course:   Nelsy Quinones was admitted to [facility] with " attending Ash Bergman MD on a 72 hour mental health hold, but patient subsequently signed in voluntarily. The patient was placed under status 15 (15 minute checks) to ensure patient safety.   CBC, BMP and utox obtained.    The patient had not been taking any medications as an outpatient. The patient was provided information on selective serotonin reuptake inhibitors but declined to start these. Was open to therapy and appointments were scheduled. Resources provided for housing by [facility].     Nelsy Quinones did participate in groups and was visible in the milieu.     The patient's symptoms of depression improved. The patient's family was contacted on the day of discharge who had no concerns for her safety if discharged.     Nelsy Quinones was released to home. At the time of discharge Nelsy Quinones was determined to not be a danger to herself or others. At the current time of discharge, the patient does not meet criteria for involuntary hospitalization. On the day of discharge, the patient reports that they do not have suicidal or homicidal ideation and would never hurt themselves or others. Steps taken to minimize risk include: assessing patient s behavior and thought process daily during hospital stay, discharging patient with adequate plan for follow up for mental and physical health and discussing safety plan of returning to the hospital should the patient ever have thoughts of harming themselves or others. Therefore, based on all available evidence including the factors cited above, the patient does not appear to be at imminent risk for self-harm, and is appropriate for outpatient level of care.           Discharge Medications:     Current Discharge Medication List      CONTINUE these medications which have NOT CHANGED    Details   levETIRAcetam (KEPPRA) 500 MG tablet Take 1,000 mg by mouth 2 times daily                  Psychiatric Examination:   Appearance:  awake, alert and adequately  groomed  Attitude:  cooperative  Eye Contact:  good  Mood:  better  Affect:  mood congruent  Speech:  clear, coherent  Psychomotor Behavior:  no evidence of tardive dyskinesia, dystonia, or tics  Thought Process:  goal oriented  Associations:  no loose associations  Thought Content:  no evidence of suicidal ideation or homicidal ideation and no evidence of psychotic thought  Insight:  fair  Judgment:  fair  Oriented to:  time, person, and place  Attention Span and Concentration:  intact  Recent and Remote Memory:  intact  Language: Able to read and write  Fund of Knowledge: appropriate  Muscle Strength and Tone: normal  Gait and Station: Normal         Discharge Plan:   Continue medications as above.     Mental Health Follow-Up:  - We recommend that you follow-up with Individual Therapy.  Since you are not taking medications, we did not schedule Psychiatry for you.  We have listed shelter resources below if you need them and also      - Individual Therapy Follow-up  Appointment:  Friday, 8/5/2022 @ 11:00 am  cullen/ Seamus Breaux MA  Location: [facility], [address], [place] [address]  (this is a VIRTUAL APPT, I have provided them your email and phone number and you will be sent forms from them)  Phone: [phone]  Type: Teletherapy  Patient Instructions  To reduce no shows, we ask that patients call our office at [phone] and confirm their appointment and leave their email. We make effort to reach each client, but if we cannot reach them or they do not confirm appointment, the appointment will be removed. Please ask patients to leave a voicemail with their information.    Attestation:    The patient was seen and evaluated by me. I spent more than 30 minutes on discharge day activities. Ash Bergman MD

## 2022-08-03 NOTE — PLAN OF CARE
Goal Outcome Evaluation:        Pt denies depression, anxiety, SIB, SI, HI, and AH/VH. She played piano and identified music as a coping skill that improves her mood. She spent most of the shift in her room. She expressed difficulty accessing music related resources as a factor in her suicide attempt. She had questions about resources after discharging and her  was notified.

## 2022-08-03 NOTE — PROGRESS NOTES
08/02/22 1900   Group Therapy Session   Group Attendance attended group session   Time Session Began 1700   Time Session Ended 1755   Total Time patient participated (minutes) 50   Total # Attendees 4-5   Group Type recreation   Group Topic Covered relaxation techniques   Group Session Detail stress reduction   Patient Response/Contribution cooperative with task   Patient Response Detail Pt actively participated in a structured Therapeutic Recreation group with a focus on leisure participation, socializing, and exercise. Pt participated in the guided exercise for the full duration of the group. Pt followed along, engaged in the guided chair exercise routine and added to the discussion prompts throughout the routine.  Pt was encouraged to use positive imagery with the deep breathing and stretching to foster relaxation, improves focus, and reduce stress.      normal...

## 2022-08-03 NOTE — PLAN OF CARE
Goal Outcome Evaluation:        Discharge orders are received.  Reviewed and discussed discharge instructions, follow up care, future appointments and medication administration. Pt expressed music as a strength and plans to utilize music more to cope. Patient denies thoughts of SI, SIB or hallucinations.  Verbalized understanding of discharge instructions. Received personal belongings.  All forms are signed.  Patient to discharge to home. She left at 1330

## 2022-08-03 NOTE — PLAN OF CARE
"Problem: Suicide Risk  Goal: Absence of Self-Harm  Outcome: Ongoing, Progressing     Problem: Mood Impairment (Psychotic Signs/Symptoms)  Goal: Improved Mood Symptoms (Psychotic Signs/Symptoms)  Outcome: Ongoing, Progressing  Flowsheets (Taken 8/2/2022 2102)  Mutually Determined Action Steps (Improved Mood Symptoms): acknowledges progress     Patient reports that she continues to feel some depression and anxiety, but overall is feeling better and ready for discharge soon. States that she needs to get home to be with her baby. She denied SI/SIB/HI/AH/VH and contracts for safety. Affect is flat. She is pleasant and cooperative on approach. Patient spent some time resting in her room and talking on the phone through out the evening. She attended group in the afternoon and was out at meal time. Patient took scheduled medication at bedtime and denied any other concerns at this time.     /73 (BP Location: Right arm, Patient Position: Sitting, Cuff Size: Adult Regular)   Pulse 82   Temp 99  F (37.2  C) (Temporal)   Resp 16   Ht (!) 0.137 m (5.4\")   Wt 65.1 kg (143 lb 8 oz)   SpO2 96%   BMI 3459.93 kg/m     "

## 2022-08-04 ENCOUNTER — PATIENT OUTREACH (OUTPATIENT)
Dept: CARE COORDINATION | Facility: CLINIC | Age: 22
End: 2022-08-04

## 2022-08-04 DIAGNOSIS — Z71.89 OTHER SPECIFIED COUNSELING: ICD-10-CM

## 2022-08-04 NOTE — PROGRESS NOTES
Clinic Care Coordination Contact  [facility]/Voicemail       Clinical Data: Care Coordinator Outreach  Outreach attempted x 1. Unable to leave message on patient's voicemail with call back information.  Plan: Care Coordinator will try to reach patient again in 1-2 business days.    ESTELA Dang  [facility]  [facility]     *Connected Care Resource Team does NOT follow patient ongoing. Referrals are identified based on internal discharge reports and the outreach is to ensure patient has an understanding of their discharge instructions.

## 2022-08-05 NOTE — PROGRESS NOTES
[facility]    Background: Care Coordination referral placed from [facility] discharge report for reason of patient meeting criteria for a TCM outreach call by [facility] team.    Assessment: Upon chart review, CCRC Team member will cancel/close the referral for TCM outreach due to reason below:    Patient has a follow up appointment with an appropriate provider today for hospital discharge     Per AVS:  Appointment: Friday, 8/5/2022 @ 11:00 am w/ Seamus Breaux MA  Location: [facility], [address], [place] [address] (this is a VIRTUAL APPT, I have provided  them your email and phone number and you will be sent forms from them)  Phone: [phone]  Type: Teletherapy    Plan: Care Coordination referral for TCM outreach canceled.      ESTELA Mc  [facility], [facility]    *Connected Care Resource Team does NOT follow patient ongoing. Referrals are identified based on internal discharge reports and the outreach is to ensure patient has an understanding of their discharge instructions.

## 2022-08-20 ENCOUNTER — HEALTH MAINTENANCE LETTER (OUTPATIENT)
Age: 22
End: 2022-08-20

## 2022-09-14 ENCOUNTER — HOSPITAL ENCOUNTER (EMERGENCY)
Facility: CLINIC | Age: 22
Discharge: HOME | End: 2022-09-15
Attending: EMERGENCY MEDICINE | Admitting: EMERGENCY MEDICINE
Payer: COMMERCIAL

## 2022-09-14 DIAGNOSIS — N39.0 URINARY TRACT INFECTION WITHOUT HEMATURIA, SITE UNSPECIFIED: ICD-10-CM

## 2022-09-14 DIAGNOSIS — D64.9 ANEMIA, UNSPECIFIED TYPE: ICD-10-CM

## 2022-09-14 DIAGNOSIS — R56.9 SEIZURES (H): Primary | ICD-10-CM

## 2022-09-14 LAB
ALBUMIN SERPL BCG-MCNC: 4.2 G/DL (ref 3.5–5.2)
ALP SERPL-CCNC: 51 U/L (ref 35–104)
ALT SERPL W P-5'-P-CCNC: 13 U/L (ref 10–35)
ANION GAP SERPL CALCULATED.3IONS-SCNC: 9 MMOL/L (ref 7–15)
AST SERPL W P-5'-P-CCNC: 16 U/L (ref 10–35)
BASOPHILS # BLD AUTO: 0 10E3/UL (ref 0–0.2)
BASOPHILS NFR BLD AUTO: 0 %
BILIRUB SERPL-MCNC: 0.2 MG/DL
BUN SERPL-MCNC: 13.2 MG/DL (ref 6–20)
CALCIUM SERPL-MCNC: 8.8 MG/DL (ref 8.6–10)
CHLORIDE SERPL-SCNC: 106 MMOL/L (ref 98–107)
CREAT SERPL-MCNC: 0.68 MG/DL (ref 0.51–0.95)
DEPRECATED HCO3 PLAS-SCNC: 25 MMOL/L (ref 22–29)
EOSINOPHIL # BLD AUTO: 0.1 10E3/UL (ref 0–0.7)
EOSINOPHIL NFR BLD AUTO: 2 %
ERYTHROCYTE [DISTWIDTH] IN BLOOD BY AUTOMATED COUNT: 15.4 % (ref 10–15)
GFR SERPL CREATININE-BSD FRML MDRD: >90 ML/MIN/1.73M2
GLUCOSE BLDC GLUCOMTR-MCNC: 96 MG/DL (ref 70–99)
GLUCOSE SERPL-MCNC: 106 MG/DL (ref 70–99)
HCG SER QL IA.RAPID: NEGATIVE
HCT VFR BLD AUTO: 33 % (ref 35–47)
HGB BLD-MCNC: 10.1 G/DL (ref 11.7–15.7)
HOLD SPECIMEN: NORMAL
IMM GRANULOCYTES # BLD: 0 10E3/UL
IMM GRANULOCYTES NFR BLD: 0 %
LYMPHOCYTES # BLD AUTO: 2.7 10E3/UL (ref 0.8–5.3)
LYMPHOCYTES NFR BLD AUTO: 42 %
MAGNESIUM SERPL-MCNC: 1.9 MG/DL (ref 1.7–2.3)
MCH RBC QN AUTO: 25.5 PG (ref 26.5–33)
MCHC RBC AUTO-ENTMCNC: 30.6 G/DL (ref 31.5–36.5)
MCV RBC AUTO: 83 FL (ref 78–100)
MONOCYTES # BLD AUTO: 0.8 10E3/UL (ref 0–1.3)
MONOCYTES NFR BLD AUTO: 12 %
NEUTROPHILS # BLD AUTO: 2.9 10E3/UL (ref 1.6–8.3)
NEUTROPHILS NFR BLD AUTO: 44 %
NRBC # BLD AUTO: 0 10E3/UL
NRBC BLD AUTO-RTO: 0 /100
PLAT MORPH BLD: NORMAL
PLATELET # BLD AUTO: 143 10E3/UL (ref 150–450)
POTASSIUM SERPL-SCNC: 3.4 MMOL/L (ref 3.4–5.3)
PROT SERPL-MCNC: 7.2 G/DL (ref 6.4–8.3)
RBC # BLD AUTO: 3.96 10E6/UL (ref 3.8–5.2)
RBC MORPH BLD: NORMAL
SODIUM SERPL-SCNC: 140 MMOL/L (ref 136–145)
WBC # BLD AUTO: 6.5 10E3/UL (ref 4–11)

## 2022-09-14 PROCEDURE — 80053 COMPREHEN METABOLIC PANEL: CPT | Performed by: EMERGENCY MEDICINE

## 2022-09-14 PROCEDURE — 80177 DRUG SCRN QUAN LEVETIRACETAM: CPT | Performed by: EMERGENCY MEDICINE

## 2022-09-14 PROCEDURE — 83735 ASSAY OF MAGNESIUM: CPT | Performed by: EMERGENCY MEDICINE

## 2022-09-14 PROCEDURE — 99283 EMERGENCY DEPT VISIT LOW MDM: CPT

## 2022-09-14 PROCEDURE — 36415 COLL VENOUS BLD VENIPUNCTURE: CPT | Performed by: EMERGENCY MEDICINE

## 2022-09-14 PROCEDURE — 84702 CHORIONIC GONADOTROPIN TEST: CPT

## 2022-09-14 PROCEDURE — 85025 COMPLETE CBC W/AUTO DIFF WBC: CPT | Performed by: EMERGENCY MEDICINE

## 2022-09-14 ASSESSMENT — ENCOUNTER SYMPTOMS
PHOTOPHOBIA: 0
BACK PAIN: 0
ABDOMINAL PAIN: 0
DIARRHEA: 0
FEVER: 0
PALPITATIONS: 0
CHILLS: 0
CHEST TIGHTNESS: 0
HEMATURIA: 0
SEIZURES: 1
COLOR CHANGE: 0
AGITATION: 0
EYE PAIN: 0
NAUSEA: 0
SHORTNESS OF BREATH: 0
LIGHT-HEADEDNESS: 0
VOMITING: 0
NECK STIFFNESS: 0
NECK PAIN: 0
HEADACHES: 1
RHINORRHEA: 0
DYSURIA: 0

## 2022-09-14 ASSESSMENT — ACTIVITIES OF DAILY LIVING (ADL): ADLS_ACUITY_SCORE: 35

## 2022-09-15 VITALS
HEART RATE: 89 BPM | WEIGHT: 140 LBS | DIASTOLIC BLOOD PRESSURE: 57 MMHG | RESPIRATION RATE: 18 BRPM | HEIGHT: 64 IN | OXYGEN SATURATION: 100 % | BODY MASS INDEX: 23.9 KG/M2 | SYSTOLIC BLOOD PRESSURE: 113 MMHG | TEMPERATURE: 98.6 F

## 2022-09-15 LAB
ALBUMIN UR-MCNC: 20 MG/DL
APPEARANCE UR: CLEAR
BILIRUB UR QL STRIP: NEGATIVE
COLOR UR AUTO: YELLOW
GLUCOSE UR STRIP-MCNC: NEGATIVE MG/DL
HGB UR QL STRIP: NEGATIVE
KETONES UR STRIP-MCNC: NEGATIVE MG/DL
LEUKOCYTE ESTERASE UR QL STRIP: ABNORMAL
LEVETIRACETAM SERPL-MCNC: 27.2 ΜG/ML (ref 10–40)
MUCOUS THREADS #/AREA URNS LPF: PRESENT /LPF
NITRATE UR QL: NEGATIVE
PH UR STRIP: 6 [PH] (ref 5–7)
RBC URINE: 1 /HPF
SP GR UR STRIP: 1.03 (ref 1–1.03)
SQUAMOUS EPITHELIAL: 3 /HPF
UROBILINOGEN UR STRIP-MCNC: 2 MG/DL
WBC URINE: 6 /HPF

## 2022-09-15 PROCEDURE — 87086 URINE CULTURE/COLONY COUNT: CPT | Performed by: EMERGENCY MEDICINE

## 2022-09-15 PROCEDURE — 81001 URINALYSIS AUTO W/SCOPE: CPT | Performed by: EMERGENCY MEDICINE

## 2022-09-15 RX ORDER — CEPHALEXIN 500 MG/1
500 CAPSULE ORAL 2 TIMES DAILY
Qty: 10 CAPSULE | Refills: 0 | Status: SHIPPED | OUTPATIENT
Start: 2022-09-15 | End: 2022-09-20

## 2022-09-15 ASSESSMENT — ACTIVITIES OF DAILY LIVING (ADL): ADLS_ACUITY_SCORE: 35

## 2022-09-15 NOTE — ED PROVIDER NOTES
"  History   Chief Complaint:  Seizures       HPI   Nelsy Quinones is a 22 year old female with history of seizure disorder who presents with possible seizure episode.  Patient states that she has not been taking her Keppra since Monday due to her \"full schedule.\"  Patient states that she normally takes 2 g of Keppra each day.  Patient takes it normally 2 tablets twice a day.  Patient states that normally, when she misses her doses, she developed a \"mini seizure\" in which she makes weird groaning sounds and then proceeds to have generalized shakes.  Patient states that today, her family noticed that she was beginning to develop 1 of these episodes, so EMS was called.  Patient denies having a full tonic-clonic seizure and states that she remembers the event.  Patient states that she took her nighttime dose of Keppra around 2120.  Patient currently is asymptomatic.  Patient denies any tongue biting or urinary incontinence.  Patient otherwise feels well right now.  Patient denies any lightheaded, dizziness, photophobia, nausea, vomiting, chest pain, or shortness of breath. No falls or head/neck injury.    Review of Systems   Constitutional: Negative for chills and fever.   HENT: Negative for congestion and rhinorrhea.    Eyes: Negative for photophobia and pain.   Respiratory: Negative for chest tightness and shortness of breath.    Cardiovascular: Negative for chest pain and palpitations.   Gastrointestinal: Negative for abdominal pain, diarrhea, nausea and vomiting.   Genitourinary: Negative for dysuria and hematuria.   Musculoskeletal: Negative for back pain, neck pain and neck stiffness.   Skin: Negative for color change and pallor.   Neurological: Positive for seizures and headaches. Negative for light-headedness.   Psychiatric/Behavioral: Negative for agitation and behavioral problems.       Allergies:  No Known Allergies    Medications:  cephALEXin (KEFLEX) 500 MG capsule  levETIRAcetam (KEPPRA) 500 MG " "tablet        Past Medical History:       Patient Active Problem List   Diagnosis     Suicide attempt (H)        Past Surgical History:    None    Family History:    No family history on file.    Social History:  Denies alcohol, tobacco, or drug use.  PCP: No Ref-Primary, Physician       Physical Exam     Patient Vitals for the past 24 hrs:   BP Temp Temp src Pulse Resp SpO2 Height Weight   09/15/22 0215 113/57 -- -- 89 -- 100 % -- --   09/15/22 0115 114/69 -- -- 84 -- 100 % -- --   09/15/22 0100 111/64 -- -- 78 -- 100 % -- --   09/15/22 0045 109/64 -- -- 80 -- 100 % -- --   09/15/22 0030 109/63 -- -- 82 -- 100 % -- --   09/15/22 0015 108/63 -- -- 84 -- 100 % -- --   09/15/22 0000 110/62 -- -- 87 -- -- -- --   09/14/22 2345 109/59 -- -- 89 -- -- -- --   09/14/22 2330 113/60 -- -- 82 -- -- -- --   09/14/22 2315 111/60 -- -- 82 -- -- -- --   09/14/22 2300 115/71 -- -- -- -- 100 % -- --   09/14/22 2235 -- -- -- -- -- 100 % -- --   09/14/22 2200 130/79 98.6  F (37  C) Oral 82 18 100 % 1.626 m (5' 4\") 63.5 kg (140 lb)       Physical Exam  Constitutional:       General: Not in acute distress.     Appearance: Normal appearance  HENT:      Head: Normocephalic and atraumatic.   Eyes:     Extraocular Movements: Extraocular movements intact.      Conjunctiva/sclera: Conjunctivae normal.      Pupils: Pupils are equal, round, and reactive to light.   Cardiovascular:     Rate and Rhythm: Normal rate and regular rhythm.   Pulmonary:      Effort: Pulmonary effort is normal.      Breath sounds: Normal breath sounds.   Abdominal:      General: Abdomen is flat. There is no distension.      Palpations: Abdomen is soft.      Tenderness: There is no abdominal tenderness.   Musculoskeletal:      Cervical back: Normal range of motion and neck supple. No rigidity.      General: No swelling or deformity.   Skin:     General: Skin is warm and dry.   Neurological:      General: No focal deficit present.     NIHSS: Patient alert and keenly " responsive. Able to answer month and age. Able and blink eyes and squeeze hands. No gaze palsy. No visual field deficits. No facial palsy. No arm drift bilaterally. No leg drift bilaterally. No limb ataxia. Able to complete finger nose finger and heel to shin. No sensory deficits. No language deficits/aphasia. No dysarthria. No extinction/inattention.     NIHSS score of 0.       Mental Status: Alert and oriented to person, place, and time.   Psychiatric:         Mood and Affect: Mood normal.         Behavior: Behavior normal.       Emergency Department Course   ECG  No results found for this or any previous visit.    Imaging:  No orders to display     Report per radiology    Laboratory:  Labs Ordered and Resulted from Time of ED Arrival to Time of ED Departure   COMPREHENSIVE METABOLIC PANEL - Abnormal       Result Value    Sodium 140      Potassium 3.4      Chloride 106      Carbon Dioxide (CO2) 25      Anion Gap 9      Urea Nitrogen 13.2      Creatinine 0.68      Calcium 8.8      Glucose 106 (*)     Alkaline Phosphatase 51      AST 16      ALT 13      Protein Total 7.2      Albumin 4.2      Bilirubin Total 0.2      GFR Estimate >90     ROUTINE UA WITH MICROSCOPIC REFLEX TO CULTURE - Abnormal    Color Urine Yellow      Appearance Urine Clear      Glucose Urine Negative      Bilirubin Urine Negative      Ketones Urine Negative      Specific Gravity Urine 1.034      Blood Urine Negative      pH Urine 6.0      Protein Albumin Urine 20  (*)     Urobilinogen Urine 2.0      Nitrite Urine Negative      Leukocyte Esterase Urine Moderate (*)     Mucus Urine Present (*)     RBC Urine 1      WBC Urine 6 (*)     Squamous Epithelials Urine 3 (*)    CBC WITH PLATELETS AND DIFFERENTIAL - Abnormal    WBC Count 6.5      RBC Count 3.96      Hemoglobin 10.1 (*)     Hematocrit 33.0 (*)     MCV 83      MCH 25.5 (*)     MCHC 30.6 (*)     RDW 15.4 (*)     Platelet Count 143 (*)     % Neutrophils 44      % Lymphocytes 42      % Monocytes  12      % Eosinophils 2      % Basophils 0      % Immature Granulocytes 0      NRBCs per 100 WBC 0      Absolute Neutrophils 2.9      Absolute Lymphocytes 2.7      Absolute Monocytes 0.8      Absolute Eosinophils 0.1      Absolute Basophils 0.0      Absolute Immature Granulocytes 0.0      Absolute NRBCs 0.0     MAGNESIUM - Normal    Magnesium 1.9     GLUCOSE BY METER - Normal    GLUCOSE BY METER POCT 96     ISTAT HCG QUALITATIVE PREGNANCY POCT - Normal    HCG Qualitative POCT Negative     GLUCOSE MONITOR NURSING POCT   RBC AND PLATELET MORPHOLOGY    Platelet Assessment        Value: Automated Count Confirmed. Platelet morphology is normal.    RBC Morphology Confirmed RBC Indices     KEPPRA (LEVETIRACETAM) LEVEL   URINE CULTURE      Emergency Department Course:       Reviewed:  I reviewed nursing notes, vitals and past medical history    Assessments/Consults:  ED Course as of 09/15/22 0227   Thu Sep 15, 2022   0113 Hemoglobin(!): 10.1  Mild drop below baseline   0130 Notified that patient has ambulated without difficulty and produced urine sample.  Patient tolerating p.o.  No further seizure episodes in the ER.   0208 Patient updated on lab and image findings.  Will discharge patient home.  Discussed with patient that her urine analysis sample might be slightly contaminated.  Patient not endorsing urinary symptoms, however, patient requesting antibiotic coverage.  Will prescribe short course of Keflex and have patient follow-up with PCP as needed.  Discussed strict return precautions.  Answered all questions.  Patient voiced understanding and agreement with plan.     Disposition:  The patient was discharged to home.     Impression & Plan     CMS Diagnoses: None    Medical Decision Makin-year-old female as described above presents to the emergency department for suspected seizure episode.  Patient hemodynamically stable at time evaluation.  Afebrile.  Patient currently is asymptomatic.  Alert and oriented x4.   Given patient is at baseline and known history of seizure disorder, no indication for CT imaging of the head at this time.  Patient states that she was able to administer her nighttime dose of Keppra at 2120.  Patient will not require additional loading dose at this time.  We will order basic lab work including puncture pregnancy test for evaluation for eclampsia or additional etiologies of seizure.  If work-up negative, will discharge patient to follow-up with PCP versus general neurology.  Discussed care plan with patient who voiced understanding and agreement with plan.  Answered all questions.  Additional work-up and orders as listed in chart.    Please refer to ED course above for details on the patient's treatment course and any changes or updates in care plan beyond my initial evaluation and MDM.    Diagnosis:    ICD-10-CM    1. Seizures (H)  R56.9    2. Urinary tract infection without hematuria, site unspecified  N39.0    3. Anemia, unspecified type  D64.9        Discharge Medications:  Discharge Medication List as of 9/15/2022  2:10 AM      START taking these medications    Details   cephALEXin (KEFLEX) 500 MG capsule Take 1 capsule (500 mg) by mouth 2 times daily for 5 days, Disp-10 capsule, R-0, [facility]                Scott Hickey DO  09/15/22 0228

## 2022-09-15 NOTE — ED TRIAGE NOTES
"Pt BIBA after having three  \"mini seizures\" at home. Has hx of seizures. Is on Keppra, forgot to take for 2 days. Took a dose when she called EMS this evenings at approximately 2120. Endorses nausea and headaches. Denies trying to ration her medications and states simply forgot to take keppra.      "

## 2022-09-15 NOTE — DISCHARGE INSTRUCTIONS
Please follow-up with your primary care provider and/or specialist regarding your visit to the ER today.    Your red blood cell count is slightly lower than normal.  Please follow-up with your primary care doctor regarding this.    Please return to the emergency department should you experience any of the symptoms we specifically discussed, including but not limited to recurrence or worsening of your symptoms, or development of any new and concerning symptoms.    Please take medication as instructed on bottle.

## 2022-09-16 ENCOUNTER — TELEPHONE (OUTPATIENT)
Dept: EMERGENCY MEDICINE | Facility: CLINIC | Age: 22
End: 2022-09-16

## 2022-09-16 LAB — BACTERIA UR CULT: NORMAL

## 2022-09-16 NOTE — TELEPHONE ENCOUNTER
[facility]) Emergency Department Lab result notification    [facility] ED lab result protocol used  Urine culture    Reason for call  Notify of lab results, assess symptoms,  review ED providers recommendations/discharge instructions (if necessary) and advise per ED lab result f/u protocol    Lab Result (including Rx patient on, if applicable)  Final urine culture report is negative.  Adult Negative Urine culture parameters per protocol: Any # Urogenital single or mixed organism, <10,000 col/ml single organism (cath/midstream), and > 3 organisms (No susceptibilities performed).  [facility] Emergency Dept discharge antibiotic prescribed (If applicable): Cephalexin (Keflex) 500 mg capsule, 1 capsule (500 mg) by mouth 2 times daily for 5 days.  Treatment recommendations per [facility] ED Lab Result Urine Culture protocol.       RN Assessment (Patient s current Symptoms), include time called.   4:13 PM - call to patient to discuss final urine culture - negative/mixed growth due to patient asymptomatic in ED - has been having headaches - has been compliant with medication - she is also have some intermittent fatigue, weakness, dizziness but not feeling so weak she cannot stand, walk to bathroom or feels she is going to pass out - she is currently at work   Genitourinary symptoms:  No  Fever: No  Seizure activity:  None - compliant with  medications    RN Recommendations/Instructions per [facility] ED lab result protocol  Patient notified of lab result and treatment recommendations. Advised negative urine culture - asymptomatic for genitourinary symptoms - may stop keflex at this time - advised to f/u for any new symptoms - she is encouraged to drink plenty of fluids and eat an iron rich diet to support her nutrition - we reviewed AVS instructions and she is encouraged to f/u with PCP as instructed and encouraged to return to ED for any severe symptoms of weakness or feeling faint - she was offered  triage for additional assessment of her symptoms which she refused and said she may call back later.      Please Contact your PCP clinic or return to the Emergency department if your:    Symptoms return.    Symptoms worsen or other concerning symptom's.    PCP follow-up Questions asked: YES       Mica Maloney RN  [facility]  Emergency Dept Lab Result RN  Ph# [phone]     Copy of Lab result   Urine Culture  Order: [id number] - Reflex for Order [id number]   Status: Final result     Visible to patient: Yes (seen)    Specimen Information: Urine, Clean Catch         1 Result Note    Culture <10,000 CFU/mL Mixture of urogenital sandra            Resulting Agency: [facility]           Specimen Collected: 09/15/22  1:23 AM Last Resulted: 09/16/22  6:02 AM

## 2022-10-05 ENCOUNTER — APPOINTMENT (OUTPATIENT)
Dept: CT IMAGING | Facility: CLINIC | Age: 22
End: 2022-10-05
Attending: EMERGENCY MEDICINE
Payer: COMMERCIAL

## 2022-10-05 ENCOUNTER — NURSE TRIAGE (OUTPATIENT)
Dept: NURSING | Facility: CLINIC | Age: 22
End: 2022-10-05

## 2022-10-05 ENCOUNTER — HOSPITAL ENCOUNTER (EMERGENCY)
Facility: CLINIC | Age: 22
Discharge: HOME | End: 2022-10-06
Attending: EMERGENCY MEDICINE | Admitting: EMERGENCY MEDICINE
Payer: COMMERCIAL

## 2022-10-05 DIAGNOSIS — Z32.01 PREGNANCY TEST POSITIVE: ICD-10-CM

## 2022-10-05 DIAGNOSIS — R10.9 ABDOMINAL PAIN OF UNKNOWN CAUSE: ICD-10-CM

## 2022-10-05 LAB
ALBUMIN UR-MCNC: 10 MG/DL
ANION GAP SERPL CALCULATED.3IONS-SCNC: 9 MMOL/L (ref 7–15)
APPEARANCE UR: CLEAR
BASOPHILS # BLD AUTO: 0 10E3/UL (ref 0–0.2)
BASOPHILS NFR BLD AUTO: 0 %
BILIRUB UR QL STRIP: NEGATIVE
BUN SERPL-MCNC: 10.8 MG/DL (ref 6–20)
CALCIUM SERPL-MCNC: 9.1 MG/DL (ref 8.6–10)
CHLORIDE SERPL-SCNC: 104 MMOL/L (ref 98–107)
COLOR UR AUTO: ABNORMAL
CREAT SERPL-MCNC: 0.67 MG/DL (ref 0.51–0.95)
DEPRECATED HCO3 PLAS-SCNC: 26 MMOL/L (ref 22–29)
EOSINOPHIL # BLD AUTO: 0.1 10E3/UL (ref 0–0.7)
EOSINOPHIL NFR BLD AUTO: 1 %
ERYTHROCYTE [DISTWIDTH] IN BLOOD BY AUTOMATED COUNT: 15.9 % (ref 10–15)
GFR SERPL CREATININE-BSD FRML MDRD: >90 ML/MIN/1.73M2
GLUCOSE SERPL-MCNC: 87 MG/DL (ref 70–99)
GLUCOSE UR STRIP-MCNC: NEGATIVE MG/DL
HCG INTACT+B SERPL-ACNC: 39 MIU/ML
HCG SER QL IA.RAPID: NEGATIVE
HCG UR QL: NEGATIVE
HCT VFR BLD AUTO: 33.2 % (ref 35–47)
HGB BLD-MCNC: 10.1 G/DL (ref 11.7–15.7)
HGB UR QL STRIP: ABNORMAL
HOLD SPECIMEN: NORMAL
IMM GRANULOCYTES # BLD: 0 10E3/UL
IMM GRANULOCYTES NFR BLD: 0 %
KETONES UR STRIP-MCNC: NEGATIVE MG/DL
LEUKOCYTE ESTERASE UR QL STRIP: ABNORMAL
LYMPHOCYTES # BLD AUTO: 2.1 10E3/UL (ref 0.8–5.3)
LYMPHOCYTES NFR BLD AUTO: 27 %
MCH RBC QN AUTO: 25.6 PG (ref 26.5–33)
MCHC RBC AUTO-ENTMCNC: 30.4 G/DL (ref 31.5–36.5)
MCV RBC AUTO: 84 FL (ref 78–100)
MONOCYTES # BLD AUTO: 0.7 10E3/UL (ref 0–1.3)
MONOCYTES NFR BLD AUTO: 8 %
MUCOUS THREADS #/AREA URNS LPF: PRESENT /LPF
NEUTROPHILS # BLD AUTO: 5 10E3/UL (ref 1.6–8.3)
NEUTROPHILS NFR BLD AUTO: 64 %
NITRATE UR QL: NEGATIVE
NRBC # BLD AUTO: 0 10E3/UL
NRBC BLD AUTO-RTO: 0 /100
PH UR STRIP: 7 [PH] (ref 5–7)
PLATELET # BLD AUTO: 147 10E3/UL (ref 150–450)
POTASSIUM SERPL-SCNC: 3.6 MMOL/L (ref 3.4–5.3)
RBC # BLD AUTO: 3.94 10E6/UL (ref 3.8–5.2)
RBC URINE: 10 /HPF
SODIUM SERPL-SCNC: 139 MMOL/L (ref 136–145)
SP GR UR STRIP: 1.02 (ref 1–1.03)
SQUAMOUS EPITHELIAL: 2 /HPF
UROBILINOGEN UR STRIP-MCNC: NORMAL MG/DL
WBC # BLD AUTO: 7.8 10E3/UL (ref 4–11)
WBC URINE: 16 /HPF

## 2022-10-05 PROCEDURE — 87086 URINE CULTURE/COLONY COUNT: CPT | Performed by: EMERGENCY MEDICINE

## 2022-10-05 PROCEDURE — 250N000011 HC RX IP 250 OP 636: Performed by: EMERGENCY MEDICINE

## 2022-10-05 PROCEDURE — 80048 BASIC METABOLIC PNL TOTAL CA: CPT | Performed by: EMERGENCY MEDICINE

## 2022-10-05 PROCEDURE — 81001 URINALYSIS AUTO W/SCOPE: CPT | Performed by: EMERGENCY MEDICINE

## 2022-10-05 PROCEDURE — 84702 CHORIONIC GONADOTROPIN TEST: CPT | Performed by: EMERGENCY MEDICINE

## 2022-10-05 PROCEDURE — 250N000009 HC RX 250: Performed by: EMERGENCY MEDICINE

## 2022-10-05 PROCEDURE — 85004 AUTOMATED DIFF WBC COUNT: CPT | Performed by: EMERGENCY MEDICINE

## 2022-10-05 PROCEDURE — 84702 CHORIONIC GONADOTROPIN TEST: CPT

## 2022-10-05 PROCEDURE — 36415 COLL VENOUS BLD VENIPUNCTURE: CPT | Performed by: EMERGENCY MEDICINE

## 2022-10-05 PROCEDURE — 99285 EMERGENCY DEPT VISIT HI MDM: CPT | Mod: 25

## 2022-10-05 PROCEDURE — 74177 CT ABD & PELVIS W/CONTRAST: CPT

## 2022-10-05 PROCEDURE — 81025 URINE PREGNANCY TEST: CPT | Performed by: EMERGENCY MEDICINE

## 2022-10-05 PROCEDURE — 96374 THER/PROPH/DIAG INJ IV PUSH: CPT | Mod: 59

## 2022-10-05 RX ORDER — IOPAMIDOL 755 MG/ML
500 INJECTION, SOLUTION INTRAVASCULAR ONCE
Status: COMPLETED | OUTPATIENT
Start: 2022-10-05 | End: 2022-10-05

## 2022-10-05 RX ORDER — MORPHINE SULFATE 4 MG/ML
4 INJECTION, SOLUTION INTRAMUSCULAR; INTRAVENOUS
Status: DISCONTINUED | OUTPATIENT
Start: 2022-10-05 | End: 2022-10-06 | Stop reason: HOSPADM

## 2022-10-05 RX ADMIN — SODIUM CHLORIDE 58 ML: 9 INJECTION, SOLUTION INTRAVENOUS at 23:08

## 2022-10-05 RX ADMIN — MORPHINE SULFATE 4 MG: 4 INJECTION, SOLUTION INTRAMUSCULAR; INTRAVENOUS at 22:23

## 2022-10-05 RX ADMIN — IOPAMIDOL 69 ML: 755 INJECTION, SOLUTION INTRAVENOUS at 23:08

## 2022-10-05 ASSESSMENT — ACTIVITIES OF DAILY LIVING (ADL)
ADLS_ACUITY_SCORE: 33
ADLS_ACUITY_SCORE: 35

## 2022-10-05 ASSESSMENT — ENCOUNTER SYMPTOMS
ABDOMINAL PAIN: 1
HEMATURIA: 0
DIFFICULTY URINATING: 0
DYSURIA: 0

## 2022-10-05 NOTE — Clinical Note
Nelsy Quinones was seen and treated in our emergency department on 10/5/2022.  She may return to work on 10/10/2022.       If you have any questions or concerns, please don't hesitate to call.      Chloe Mosley MD

## 2022-10-05 NOTE — TELEPHONE ENCOUNTER
Patient calling reporting having moderate abdominal pain for the past 3 days. Reports having headaches and being lightheaded intermittently during this time as well. Patient does report using Plan B recently so is unsure if symptoms could be related. Reports the abdomen is sensitive to the touch and pain seems worse when moved into different positions. Denies severe pain, vomiting red blood and tarry bowel movements. Advised per protocol to be seen in urgent care today with patient agreeable to the plan.     Dania Camargo RN 10/05/22 2:00 PM   [facility] Triage Nurse Advisor    Reason for Disposition    Constant abdominal pain lasting > 2 hours    Additional Information    Negative: Passed out (i.e., fainted, collapsed and was not responding)    Negative: Shock suspected (e.g., cold/pale/clammy skin, too weak to stand, low BP, rapid pulse)    Negative: Sounds like a life-threatening emergency to the triager    Negative: Chest pain    Negative: Pain is mainly in upper abdomen (if needed ask: 'is it mainly above the belly button?')    Negative: Abdominal pain and pregnant < 20 weeks    Negative: Abdominal pain and pregnant 20 or more weeks    Negative: SEVERE abdominal pain (e.g., excruciating)    Negative: Vomiting red blood or black (coffee ground) material    Negative: Bloody, black, or tarry bowel movements  (Exception: Chronic-unchanged black-grey bowel movements and is taking iron pills or Pepto-Bismol.)    Protocols used: ABDOMINAL PAIN - FEMALE-A-OH

## 2022-10-06 ENCOUNTER — APPOINTMENT (OUTPATIENT)
Dept: ULTRASOUND IMAGING | Facility: CLINIC | Age: 22
End: 2022-10-06
Attending: EMERGENCY MEDICINE
Payer: COMMERCIAL

## 2022-10-06 VITALS
TEMPERATURE: 97.6 F | DIASTOLIC BLOOD PRESSURE: 63 MMHG | OXYGEN SATURATION: 99 % | HEART RATE: 78 BPM | SYSTOLIC BLOOD PRESSURE: 119 MMHG | RESPIRATION RATE: 20 BRPM

## 2022-10-06 PROCEDURE — 250N000013 HC RX MED GY IP 250 OP 250 PS 637: Performed by: EMERGENCY MEDICINE

## 2022-10-06 PROCEDURE — 76830 TRANSVAGINAL US NON-OB: CPT

## 2022-10-06 RX ORDER — ACETAMINOPHEN 500 MG
1000 TABLET ORAL
Status: COMPLETED | OUTPATIENT
Start: 2022-10-06 | End: 2022-10-06

## 2022-10-06 RX ORDER — OXYCODONE HYDROCHLORIDE 5 MG/1
5 TABLET ORAL EVERY 6 HOURS PRN
Qty: 10 TABLET | Refills: 0 | Status: ON HOLD | OUTPATIENT
Start: 2022-10-06 | End: 2023-04-06

## 2022-10-06 RX ORDER — ONDANSETRON 4 MG/1
4 TABLET, ORALLY DISINTEGRATING ORAL EVERY 8 HOURS PRN
Qty: 10 TABLET | Refills: 0 | Status: ON HOLD | OUTPATIENT
Start: 2022-10-06 | End: 2023-04-06

## 2022-10-06 RX ADMIN — ACETAMINOPHEN 1000 MG: 500 TABLET, FILM COATED ORAL at 01:29

## 2022-10-06 ASSESSMENT — ACTIVITIES OF DAILY LIVING (ADL): ADLS_ACUITY_SCORE: 35

## 2022-10-06 NOTE — DISCHARGE INSTRUCTIONS
Discharge Instructions  Abdominal Pain    Abdominal pain (belly pain) can be caused by many things. Your evaluation today does not show the exact cause for your pain. Your provider today has decided that it is unlikely your pain is due to a life threatening problem, or a problem requiring surgery or hospital admission. Sometimes those problems cannot be found right away, so it is very important that you follow up as directed.  Sometimes only the changes which occur over time allow the cause of your pain to be found.    Generally, every Emergency Department visit should have a follow-up clinic visit with either a primary or a specialty clinic/provider. Please follow-up as instructed by your emergency provider today. With abdominal pain, we often recommend very close follow-up, such as the following day.    ADULTS:  Return to the Emergency Department right away if:    You get an oral temperature above 102oF or as directed by your provider.  You have blood in your stools. This may be bright red or appear as black, tarry stools.    You keep vomiting (throwing up) or cannot drink liquids.  You see blood when you vomit.   You cannot have a bowel movement or you cannot pass gas.  Your stomach gets bloated or bigger.  Your skin or the whites of your eyes look yellow.  You faint.  You have bloody, frequent or painful urination (peeing).  You have new symptoms or anything that worries you.    CHILDREN:  Return to the Emergency Department right away if your child has any of the above-listed symptoms or the following:    Pushes your hand away or screams/cries when his/her belly is touched.  You notice your child is very fussy or weak.  Your child is very tired and is too tired to eat or drink.  Your child is dehydrated.  Signs of dehydration can be:  Significant change in the amount of wet diapers/urine.  Your infant or child starts to have dry mouth and lips, or no saliva (spit) or tears.    PREGNANT WOMEN:  Return to the  Emergency Department right away if you have any of the above-listed symptoms or the following:    You have bleeding, leaking fluid or passing tissue from the vagina.  You have worse pain or cramping, or pain in your shoulder or back.  You have vomiting that will not stop.  You have a temperature of 100oF or more.  Your baby is not moving as much as usual.  You faint.  You get a bad headache with or without eye problems and abdominal pain.  You have a seizure.  You have unusual discharge from your vagina and abdominal pain.    Abdominal pain is pretty common during pregnancy.  Your pain may or may not be related to your pregnancy. You should follow-up closely with your OB provider so they can evaluate you and your baby.  Until you follow-up with your regular provider, do the following:     Avoid sex and do not put anything in your vagina.  Drink clear fluids.  Only take medications approved by your provider.    MORE INFORMATION:    Appendicitis:  A possible cause of abdominal pain in any person who still has their appendix is acute appendicitis. Appendicitis is often hard to diagnose.  Testing does not always rule out early appendicitis or other causes of abdominal pain. Close follow-up with your provider and re-evaluations may be needed to figure out the reason for your abdominal pain.    Follow-up:  It is very important that you make an appointment with your clinic and go to the appointment.  If you do not follow-up with your primary provider, it may result in missing an important development which could result in permanent injury or disability and/or lasting pain.  If there is any problem keeping your appointment, call your provider or return to the Emergency Department.    Medications:  Take your medications as directed by your provider today.  Before using over-the-counter medications, ask your provider and make sure to take the medications as directed.  If you have any questions about medications, ask your  "provider.    Diet:  Resume your normal diet as much as possible, but do not eat fried, fatty or spicy foods while you have pain.  Do not drink alcohol or have caffeine.  Do not smoke tobacco.    Probiotics: If you have been given an antibiotic, you may want to also take a probiotic pill or eat yogurt with live cultures. Probiotics have \"good bacteria\" to help your intestines stay healthy. Studies have shown that probiotics help prevent diarrhea (loose stools) and other intestine problems (including C. diff infection) when you take antibiotics. You can buy these without a prescription in the pharmacy section of the store.     If you were given a prescription for medicine here today, be sure to read all of the information (including the package insert) that comes with your prescription.  This will include important information about the medicine, its side effects, and any warnings that you need to know about.  The pharmacist who fills the prescription can provide more information and answer questions you may have about the medicine.  If you have questions or concerns that the pharmacist cannot address, please call or return to the Emergency Department.       Remember that you can always come back to the Emergency Department if you are not able to see your regular provider in the amount of time listed above, if you get any new symptoms, or if there is anything that worries you.     You had one negative pregnancy test and one slightly positive.  The second test may have been falsely positive.  You should have a repeat pregnancy test early next week.  Until then avoid anti-inflammatories, alcohol.  "

## 2022-10-06 NOTE — ED PROVIDER NOTES
History   Chief Complaint:  Abdominal Pain       The history is provided by the patient.      Nelsy Quinones is a 22 year old female with history of seizures who presents with bilateral lower abdominal pain that started 2-3 days ago which is exacerbated by movement. The patient denies that she is experiencing urinary symptoms, bowel movement changes, fever, chills, vaginal bleeding, or vaginal discharge. She has had some hot flashes. She denies any allergies to medicine, previous abdominal surgeries, chance of pregnancy, or significant medical history. Her last bowel movement was this morning and was normal.    Review of Systems   Gastrointestinal: Positive for abdominal pain.   Genitourinary: Negative for difficulty urinating, dysuria, hematuria, vaginal bleeding and vaginal discharge.   All other systems reviewed and are negative.    Allergies:  No Known Allergies    Medications:  Levetiracetam  Prenatal    Past Medical History:     Suicide attempt  Anemia  Seizure  Hyperopia  Esotropia  Otitis media    Past Surgical History:    No past surgical history    Family History:    Mother: blood disease, diabetes, hypertension, lipids    Social History:  The patient presents to the ED alone  PCP: No Ref-Primary, Physician     Physical Exam     Patient Vitals for the past 24 hrs:   BP Temp Temp src Pulse Resp SpO2   10/06/22 0045 -- -- -- -- -- 97 %   10/06/22 0030 -- -- -- -- -- 99 %   10/06/22 0015 -- -- -- -- -- 98 %   10/06/22 0000 -- -- -- -- -- 99 %   10/05/22 2300 112/75 -- -- 72 -- 99 %   10/05/22 1951 134/69 97.6  F (36.4  C) Temporal 79 20 100 %       Physical Exam  General: Adult female sitting up  Eyes: PERRL, Conjunctive within normal limits.  No scleral icterus.  ENT: Moist mucous membranes, oropharynx clear.   CV: Normal S1S2, no murmur, rub or gallop. Regular rate and rhythm  Resp: Clear to auscultation bilaterally, no wheezes, rales or rhonchi. Normal respiratory effort.  GI: Abdomen is soft and  nondistended.  Right lower quadrant and suprapubic tenderness to palpation.  No palpable masses. No rebound or guarding.  MSK: No edema. Nontender. Normal active range of motion.  Skin: Warm and dry. No rashes or lesions or ecchymoses on visible skin.  Neuro: Alert and oriented. Responds appropriately to all questions and commands.   Psych: Normal mood and affect. Pleasant.    Emergency Department Course     Imaging:  CT Abdomen Pelvis w Contrast   Final Result   IMPRESSION:    1.  Formed stool material within normal caliber redundant colon without obstruction or free gas. Normal appendix. Minor soft tissue density in the left paracolic cutter, of uncertain origin and significance. Tiny hiatal hernia. Small amount of focal fat    in the usual location in the left hepatic lobe.      2.  Bilateral adnexal hypodensities, possibly enlarged ovaries. If there is continued clinical concern, consider dedicated pelvic ultrasound for further assessment. Small amount of dependent pelvic free fluid, likely physiologic.       US Pelvis Cmplt w Transvag & Doppler LmtPel Duplex Limited    (Results Pending)     Report per radiology    Laboratory:  Labs Ordered and Resulted from Time of ED Arrival to Time of ED Departure   CBC WITH PLATELETS AND DIFFERENTIAL - Abnormal       Result Value    WBC Count 7.8      RBC Count 3.94      Hemoglobin 10.1 (*)     Hematocrit 33.2 (*)     MCV 84      MCH 25.6 (*)     MCHC 30.4 (*)     RDW 15.9 (*)     Platelet Count 147 (*)     % Neutrophils 64      % Lymphocytes 27      % Monocytes 8      % Eosinophils 1      % Basophils 0      % Immature Granulocytes 0      NRBCs per 100 WBC 0      Absolute Neutrophils 5.0      Absolute Lymphocytes 2.1      Absolute Monocytes 0.7      Absolute Eosinophils 0.1      Absolute Basophils 0.0      Absolute Immature Granulocytes 0.0      Absolute NRBCs 0.0     HCG QUANTITATIVE PREGNANCY - Abnormal    hCG Quantitative 39 (*)    ROUTINE UA WITH MICROSCOPIC REFLEX TO  CULTURE - Abnormal    Color Urine Light Yellow      Appearance Urine Clear      Glucose Urine Negative      Bilirubin Urine Negative      Ketones Urine Negative      Specific Gravity Urine 1.025      Blood Urine Moderate (*)     pH Urine 7.0      Protein Albumin Urine 10  (*)     Urobilinogen Urine Normal      Nitrite Urine Negative      Leukocyte Esterase Urine Moderate (*)     Mucus Urine Present (*)     RBC Urine 10 (*)     WBC Urine 16 (*)     Squamous Epithelials Urine 2 (*)    BASIC METABOLIC PANEL - Normal    Sodium 139      Potassium 3.6      Chloride 104      Carbon Dioxide (CO2) 26      Anion Gap 9      Urea Nitrogen 10.8      Creatinine 0.67      Calcium 9.1      Glucose 87      GFR Estimate >90     ISTAT HCG QUALITATIVE PREGNANCY POCT - Normal    HCG Qualitative POCT Negative     HCG QUALITATIVE URINE - Normal    hCG Urine Qualitative Negative     URINE CULTURE      Emergency Department Course:     Reviewed:  I reviewed nursing notes, vitals, past medical history and Care Everywhere    Assessments:  2020 I obtained history and examined the patient as noted above.   2339 I rechecked the patient and explained findings.  She notes ongoing abdominal pain.  Feels comfortable plan for ultrasound.  I reassessed the patient.  I discussed findings with her.  She denies any new concerns.  She feels comfortable to plan for discharge.    Interventions:  2223 Morphine 4 mg IV  Tylenol 1000 mg p.o.    Disposition:  The patient was discharged to home.     Impression & Plan     Medical Decision Making:  Nelsy Quinones is a 22-year-old female who presents emergency department with concerns for abdominal pain.  Comprehensive evaluation was undertaken to find a cause for her pain.  Ultimately CT scan was obtained given location of pain with concerns for appendicitis.  There is no acute findings noted on the CT aside from some adnexal fullness.  Given the location of her pain ultrasounds obtained of the pelvis but did not  show any acute pathology.  She is afebrile with normal labs aside from one slightly positive pregnancy test.  Another was negative.  I suspect the former may be a false positive but she is aware she should have repeat pregnancy test to ensure negativity.  She is denying any urine symptoms but does have some pyuria and hematuria.  Urine culture sent and pending.  No indication for therapy given lack of symptoms and likely contamination.  Although the etiology of her symptoms is not clear, at this time she is well-appearing with overall reassuring findings and is stable for discharge home with close follow-up with PCP within 3 days.  She is recommended to return me to the emergency department for worsening symptoms.  All questions were answered prior to discharge      Diagnosis:    ICD-10-CM    1. Abdominal pain of unknown cause  R10.9    2. Pregnancy test positive  Z32.01     possible false or early       Discharge Medications:  Discharge Medication List as of 10/6/2022  1:33 AM      START taking these medications    Details   ondansetron (ZOFRAN ODT) 4 MG ODT tab Take 1 tablet (4 mg) by mouth every 8 hours as needed for nausea, Disp-10 tablet, R-0, [facility]      oxyCODONE (ROXICODONE) 5 MG tablet Take 1 tablet (5 mg) by mouth every 6 hours as needed for severe pain, Disp-10 tablet, R-0, [facility]             Scribe Disclosure:  I, Cornelius Perdomo, am serving as a scribe at 10:06 PM on 10/5/2022 to document services personally performed by Chloe Mosley MD based on my observations and the provider's statements to me.        Chloe Mosley MD  10/06/22 0157

## 2022-10-06 NOTE — ED NOTES
"Writer in to answer patient's call light. Patient and s/o at bedside have many questions and complaints. Pt is very unhappy with how medications were administered and no questions about pain asked. Patient states that she has not seen a doctor and wondering when they would be in. States that the previous nurse was, \"negligent. \" Writer reminded the patient that I was just answering her call light and anything I could do to help. Apologized for the care she received and offered patient relations, charge nurse. Patient requesting to speak with charge RN. At end of conversation patient's RN Lali walked in room to continue to speak with patient. Charge RN Ciara IBARRA notified and updated on conversation, in to speak with patient.   "

## 2022-10-06 NOTE — ED PROVIDER NOTES
Rapid Assessment Note    History:   Nelsy Quinones is a 22 year old female who presents with bilateral lower abdominal pain that started 2-3 days ago which is exacerbated by movement. The patient denies that she is experiencing urinary symptoms, bowel movement changes, fever, chills, vaginal bleeding, or vaginal discharge. She has had some hot flashes. She denies any allergies to medicine, previous abdominal surgeries, chance of pregnancy, or significant medical history. Her last bowel movement was this morning and was normal.    Exam:   General:  Alert, interactive  Cardiovascular:  Well perfused  Lungs:  No respiratory distress, no accessory muscle use  Abd: Soft. Nondistended. Diffuse lower abdominal tenderness most prominent in the RLQ and suprapubic region.  Neuro:  Moving all 4 extremities  Skin:  Warm, dry  Psych:  Normal affect    Plan of Care:   I evaluated the patient and developed an initial plan of care. I discussed this plan and explained that I, or one of my partners, would be returning to complete the evaluation.     I, Cornelius Perdomo, am serving as a scribe to document services personally performed by Chloe Mosley MD , based on my observations and the provider's statements to me.    10/5/2022  [employer]    Portions of this medical record were completed by a scribe. UPON MY REVIEW AND AUTHENTICATION BY ELECTRONIC SIGNATURE, this confirms (a) I performed the applicable clinical services, and (b) the record is accurate.        Chloe Mosley MD  10/05/22 2011

## 2022-10-06 NOTE — ED TRIAGE NOTES
Here for concern of periumbilical pain x2-3 days associated with feeling weak, nausea, and headache. Took tylenol this morning but only help temporarily. ABCs intact.      Triage Assessment     Row Name 10/05/22 1950       Triage Assessment (Adult)    Airway WDL WDL       Respiratory WDL    Respiratory WDL WDL       Cardiac WDL    Cardiac WDL WDL

## 2022-10-07 LAB — BACTERIA UR CULT: NORMAL

## 2022-10-07 NOTE — RESULT ENCOUNTER NOTE
Final urine culture report is negative.  Adult Negative Urine culture parameters per protocol: Any # Urogenital single or mixed organism, <10,000 col/ml single organism (cath/midstream), and > 3 organisms (No susceptibilities performed).  [facility] Emergency Dept discharge antibiotic prescribed (If applicable): None  Treatment recommendations per [facility] ED Lab Result Urine Culture protocol.

## 2022-10-09 ENCOUNTER — APPOINTMENT (OUTPATIENT)
Dept: CT IMAGING | Facility: CLINIC | Age: 22
End: 2022-10-09
Attending: EMERGENCY MEDICINE
Payer: COMMERCIAL

## 2022-10-09 ENCOUNTER — HOSPITAL ENCOUNTER (EMERGENCY)
Facility: CLINIC | Age: 22
Discharge: HOME | End: 2022-10-09
Attending: EMERGENCY MEDICINE | Admitting: EMERGENCY MEDICINE
Payer: COMMERCIAL

## 2022-10-09 VITALS
TEMPERATURE: 99 F | SYSTOLIC BLOOD PRESSURE: 105 MMHG | RESPIRATION RATE: 16 BRPM | DIASTOLIC BLOOD PRESSURE: 63 MMHG | HEART RATE: 77 BPM | OXYGEN SATURATION: 100 %

## 2022-10-09 DIAGNOSIS — R10.11 ABDOMINAL PAIN, RIGHT UPPER QUADRANT: ICD-10-CM

## 2022-10-09 DIAGNOSIS — R07.81 RIB PAIN: ICD-10-CM

## 2022-10-09 DIAGNOSIS — R07.89 RIGHT-SIDED CHEST WALL PAIN: ICD-10-CM

## 2022-10-09 LAB
ALBUMIN SERPL BCG-MCNC: 4.1 G/DL (ref 3.5–5.2)
ALBUMIN UR-MCNC: 50 MG/DL
ALP SERPL-CCNC: 63 U/L (ref 35–104)
ALT SERPL W P-5'-P-CCNC: 14 U/L (ref 10–35)
ANION GAP SERPL CALCULATED.3IONS-SCNC: 10 MMOL/L (ref 7–15)
APPEARANCE UR: ABNORMAL
AST SERPL W P-5'-P-CCNC: 16 U/L (ref 10–35)
BASOPHILS # BLD AUTO: 0 10E3/UL (ref 0–0.2)
BASOPHILS NFR BLD AUTO: 0 %
BILIRUB SERPL-MCNC: 0.3 MG/DL
BILIRUB UR QL STRIP: NEGATIVE
BUN SERPL-MCNC: 11.6 MG/DL (ref 6–20)
CALCIUM SERPL-MCNC: 9.5 MG/DL (ref 8.6–10)
CHLORIDE SERPL-SCNC: 102 MMOL/L (ref 98–107)
COLOR UR AUTO: YELLOW
CREAT SERPL-MCNC: 0.69 MG/DL (ref 0.51–0.95)
D DIMER PPP FEU-MCNC: 2.53 UG/ML FEU (ref 0–0.5)
DEPRECATED HCO3 PLAS-SCNC: 26 MMOL/L (ref 22–29)
EOSINOPHIL # BLD AUTO: 0.1 10E3/UL (ref 0–0.7)
EOSINOPHIL NFR BLD AUTO: 1 %
ERYTHROCYTE [DISTWIDTH] IN BLOOD BY AUTOMATED COUNT: 15.9 % (ref 10–15)
GFR SERPL CREATININE-BSD FRML MDRD: >90 ML/MIN/1.73M2
GLUCOSE SERPL-MCNC: 89 MG/DL (ref 70–99)
GLUCOSE UR STRIP-MCNC: NEGATIVE MG/DL
HCG INTACT+B SERPL-ACNC: <1 MIU/ML
HCG SERPL QL: NEGATIVE
HCT VFR BLD AUTO: 36.8 % (ref 35–47)
HGB BLD-MCNC: 11.3 G/DL (ref 11.7–15.7)
HGB UR QL STRIP: NEGATIVE
IMM GRANULOCYTES # BLD: 0 10E3/UL
IMM GRANULOCYTES NFR BLD: 0 %
KETONES UR STRIP-MCNC: NEGATIVE MG/DL
LEUKOCYTE ESTERASE UR QL STRIP: ABNORMAL
LIPASE SERPL-CCNC: 17 U/L (ref 13–60)
LYMPHOCYTES # BLD AUTO: 1.4 10E3/UL (ref 0.8–5.3)
LYMPHOCYTES NFR BLD AUTO: 16 %
MCH RBC QN AUTO: 25.4 PG (ref 26.5–33)
MCHC RBC AUTO-ENTMCNC: 30.7 G/DL (ref 31.5–36.5)
MCV RBC AUTO: 83 FL (ref 78–100)
MONOCYTES # BLD AUTO: 0.7 10E3/UL (ref 0–1.3)
MONOCYTES NFR BLD AUTO: 8 %
MUCOUS THREADS #/AREA URNS LPF: PRESENT /LPF
NEUTROPHILS # BLD AUTO: 6.6 10E3/UL (ref 1.6–8.3)
NEUTROPHILS NFR BLD AUTO: 75 %
NITRATE UR QL: NEGATIVE
NRBC # BLD AUTO: 0 10E3/UL
NRBC BLD AUTO-RTO: 0 /100
PH UR STRIP: 6 [PH] (ref 5–7)
PLAT MORPH BLD: NORMAL
PLATELET # BLD AUTO: ABNORMAL 10*3/UL
POTASSIUM SERPL-SCNC: 4.4 MMOL/L (ref 3.4–5.3)
PROT SERPL-MCNC: 7.7 G/DL (ref 6.4–8.3)
RBC # BLD AUTO: 4.45 10E6/UL (ref 3.8–5.2)
RBC MORPH BLD: NORMAL
RBC URINE: 0 /HPF
SODIUM SERPL-SCNC: 138 MMOL/L (ref 136–145)
SP GR UR STRIP: 1.03 (ref 1–1.03)
SQUAMOUS EPITHELIAL: 12 /HPF
TROPONIN T SERPL HS-MCNC: <6 NG/L
UROBILINOGEN UR STRIP-MCNC: NORMAL MG/DL
WBC # BLD AUTO: 8.8 10E3/UL (ref 4–11)
WBC URINE: 38 /HPF

## 2022-10-09 PROCEDURE — 87086 URINE CULTURE/COLONY COUNT: CPT | Performed by: EMERGENCY MEDICINE

## 2022-10-09 PROCEDURE — 85018 HEMOGLOBIN: CPT | Performed by: EMERGENCY MEDICINE

## 2022-10-09 PROCEDURE — 36415 COLL VENOUS BLD VENIPUNCTURE: CPT | Performed by: EMERGENCY MEDICINE

## 2022-10-09 PROCEDURE — 250N000009 HC RX 250: Performed by: EMERGENCY MEDICINE

## 2022-10-09 PROCEDURE — 99285 EMERGENCY DEPT VISIT HI MDM: CPT | Mod: 25

## 2022-10-09 PROCEDURE — 84702 CHORIONIC GONADOTROPIN TEST: CPT | Performed by: EMERGENCY MEDICINE

## 2022-10-09 PROCEDURE — 85048 AUTOMATED LEUKOCYTE COUNT: CPT | Performed by: EMERGENCY MEDICINE

## 2022-10-09 PROCEDURE — 96374 THER/PROPH/DIAG INJ IV PUSH: CPT | Mod: 59

## 2022-10-09 PROCEDURE — 84703 CHORIONIC GONADOTROPIN ASSAY: CPT | Performed by: EMERGENCY MEDICINE

## 2022-10-09 PROCEDURE — 85379 FIBRIN DEGRADATION QUANT: CPT | Performed by: EMERGENCY MEDICINE

## 2022-10-09 PROCEDURE — 82435 ASSAY OF BLOOD CHLORIDE: CPT | Performed by: EMERGENCY MEDICINE

## 2022-10-09 PROCEDURE — 250N000011 HC RX IP 250 OP 636: Performed by: EMERGENCY MEDICINE

## 2022-10-09 PROCEDURE — 93005 ELECTROCARDIOGRAM TRACING: CPT

## 2022-10-09 PROCEDURE — 84484 ASSAY OF TROPONIN QUANT: CPT | Performed by: EMERGENCY MEDICINE

## 2022-10-09 PROCEDURE — 83690 ASSAY OF LIPASE: CPT | Performed by: EMERGENCY MEDICINE

## 2022-10-09 PROCEDURE — 71275 CT ANGIOGRAPHY CHEST: CPT

## 2022-10-09 PROCEDURE — 81001 URINALYSIS AUTO W/SCOPE: CPT | Performed by: EMERGENCY MEDICINE

## 2022-10-09 PROCEDURE — 250N000013 HC RX MED GY IP 250 OP 250 PS 637: Performed by: EMERGENCY MEDICINE

## 2022-10-09 RX ORDER — ACETAMINOPHEN 325 MG/1
975 TABLET ORAL ONCE
Status: COMPLETED | OUTPATIENT
Start: 2022-10-09 | End: 2022-10-09

## 2022-10-09 RX ORDER — IOPAMIDOL 755 MG/ML
500 INJECTION, SOLUTION INTRAVASCULAR ONCE
Status: COMPLETED | OUTPATIENT
Start: 2022-10-09 | End: 2022-10-09

## 2022-10-09 RX ORDER — KETOROLAC TROMETHAMINE 15 MG/ML
15 INJECTION, SOLUTION INTRAMUSCULAR; INTRAVENOUS ONCE
Status: COMPLETED | OUTPATIENT
Start: 2022-10-09 | End: 2022-10-09

## 2022-10-09 RX ADMIN — SODIUM CHLORIDE 80 ML: 9 INJECTION, SOLUTION INTRAVENOUS at 14:45

## 2022-10-09 RX ADMIN — KETOROLAC TROMETHAMINE 15 MG: 15 INJECTION, SOLUTION INTRAMUSCULAR; INTRAVENOUS at 12:26

## 2022-10-09 RX ADMIN — IOPAMIDOL 75 ML: 755 INJECTION, SOLUTION INTRAVENOUS at 14:45

## 2022-10-09 RX ADMIN — ACETAMINOPHEN 975 MG: 325 TABLET, FILM COATED ORAL at 11:40

## 2022-10-09 ASSESSMENT — ACTIVITIES OF DAILY LIVING (ADL)
ADLS_ACUITY_SCORE: 35

## 2022-10-09 ASSESSMENT — ENCOUNTER SYMPTOMS
COUGH: 0
RHINORRHEA: 0
VOMITING: 0
HEMATURIA: 0
FREQUENCY: 0
DYSURIA: 0
DIARRHEA: 0
SORE THROAT: 0
ABDOMINAL PAIN: 1

## 2022-10-09 NOTE — DISCHARGE INSTRUCTIONS
Please return to the ED if you have active chest pain, shortness of breath, nausea, sweatiness, or other acute changes.  Please follow up with your PCP in the next 2-3 days.      Discharge Instructions  Chest Pain    You have been seen today for chest pain or discomfort.  At this time, your provider has found no signs that your chest pain is due to a serious or life-threatening condition, (or you have declined more testing and/or admission to the hospital). However, sometimes there is a serious problem that does not show up right away. Your evaluation today may not be complete and you may need further testing and evaluation.     Generally, every Emergency Department visit should have a follow-up clinic visit with either a primary or a specialty clinic/provider. Please follow-up as instructed by your emergency provider today.  Return to the Emergency Department if:  Your chest pain changes, gets worse, starts to happen more often, or comes with less activity.  You are newly short of breath.  You get very weak or tired.  You pass out or faint.  You have any new symptoms, like fever, cough, numb legs, or you cough up blood.  You have anything else that worries you.    Until you follow-up with your regular provider, please do the following:  Take one aspirin daily unless you have an allergy or are told not to by your provider.  Follow-up with your regular provider/clinic as directed; this is very important.    If you were given a prescription for medicine here today, be sure to read all of the information (including the package insert) that comes with your prescription.  This will include important information about the medicine, its side effects, and any warnings that you need to know about.  The pharmacist who fills the prescription can provide more information and answer questions you may have about the medicine.  If you have questions or concerns that the pharmacist cannot address, please call or return to the  Emergency Department.       Remember that you can always come back to the Emergency Department if you are not able to see your regular provider in the amount of time listed above, if you get any new symptoms, or if there is anything that worries you.    Discharge Instructions  Abdominal Pain    Abdominal pain (belly pain) can be caused by many things. Your evaluation today does not show the exact cause for your pain. Your provider today has decided that it is unlikely your pain is due to a life threatening problem, or a problem requiring surgery or hospital admission. Sometimes those problems cannot be found right away, so it is very important that you follow up as directed.  Sometimes only the changes which occur over time allow the cause of your pain to be found.    Generally, every Emergency Department visit should have a follow-up clinic visit with either a primary or a specialty clinic/provider. Please follow-up as instructed by your emergency provider today. With abdominal pain, we often recommend very close follow-up, such as the following day.    ADULTS:  Return to the Emergency Department right away if:    You get an oral temperature above 102oF or as directed by your provider.  You have blood in your stools. This may be bright red or appear as black, tarry stools.    You keep vomiting (throwing up) or cannot drink liquids.  You see blood when you vomit.   You cannot have a bowel movement or you cannot pass gas.  Your stomach gets bloated or bigger.  Your skin or the whites of your eyes look yellow.  You faint.  You have bloody, frequent or painful urination (peeing).  You have new symptoms or anything that worries you.    MORE INFORMATION:    Appendicitis:  A possible cause of abdominal pain in any person who still has their appendix is acute appendicitis. Appendicitis is often hard to diagnose.  Testing does not always rule out early appendicitis or other causes of abdominal pain. Close follow-up with  "your provider and re-evaluations may be needed to figure out the reason for your abdominal pain.    Follow-up:  It is very important that you make an appointment with your clinic and go to the appointment.  If you do not follow-up with your primary provider, it may result in missing an important development which could result in permanent injury or disability and/or lasting pain.  If there is any problem keeping your appointment, call your provider or return to the Emergency Department.    Medications:  Take your medications as directed by your provider today.  Before using over-the-counter medications, ask your provider and make sure to take the medications as directed.  If you have any questions about medications, ask your provider.    Diet:  Resume your normal diet as much as possible, but do not eat fried, fatty or spicy foods while you have pain.  Do not drink alcohol or have caffeine.  Do not smoke tobacco.    Probiotics: If you have been given an antibiotic, you may want to also take a probiotic pill or eat yogurt with live cultures. Probiotics have \"good bacteria\" to help your intestines stay healthy. Studies have shown that probiotics help prevent diarrhea (loose stools) and other intestine problems (including C. diff infection) when you take antibiotics. You can buy these without a prescription in the pharmacy section of the store.     If you were given a prescription for medicine here today, be sure to read all of the information (including the package insert) that comes with your prescription.  This will include important information about the medicine, its side effects, and any warnings that you need to know about.  The pharmacist who fills the prescription can provide more information and answer questions you may have about the medicine.  If you have questions or concerns that the pharmacist cannot address, please call or return to the Emergency Department.       Remember that you can always come back " to the Emergency Department if you are not able to see your regular provider in the amount of time listed above, if you get any new symptoms, or if there is anything that worries you.

## 2022-10-09 NOTE — ED TRIAGE NOTES
Pt here by EMS for increasing abdominal pain, pain is in the  Middle-lower abdomen and travels up to her right shoulder. Pt had a work up 4 days ago and was advised to f/u with a dedicated ultrasound. Pt has an apt tomorrow with a OBGYN     Triage Assessment     Row Name 10/09/22 0957       Triage Assessment (Adult)    Airway WDL WDL       Respiratory WDL    Respiratory WDL WDL       Skin Circulation/Temperature WDL    Skin Circulation/Temperature WDL WDL       Cardiac WDL    Cardiac WDL WDL       Peripheral/Neurovascular WDL    Peripheral Neurovascular WDL WDL       Cognitive/Neuro/Behavioral WDL    Cognitive/Neuro/Behavioral WDL WDL

## 2022-10-09 NOTE — ED PROVIDER NOTES
History     Chief Complaint:  Abdominal Pain      HPI   Nelsy Quinones is a 22 year old female who presents with abdominal pain. The patient has had worsening abdominal pain with right sided rib pain and trouble breathing that started this morning. Her abdominal pain started on Monday, and she was seen on 10/05 here in the ED, and was discharged under the possibility of a false positive pregnancy test. She denies chest pain, cough, runny nose, sore throat. She denies vomiting, diarrhea. She denies urinary frequency, dysuria, and hematuria. She denies recent travel or surgery. She denies history of blood clots. She denies leg pain. She took tylenol, which has not improved her symptoms. She did not take her Keppra this morning.     Review of Systems   HENT: Negative for rhinorrhea and sore throat.    Respiratory: Negative for cough.         Pain with breathing   Cardiovascular: Negative for chest pain.   Gastrointestinal: Positive for abdominal pain (abdominal, rib pain). Negative for diarrhea and vomiting.   Genitourinary: Negative for dysuria, frequency and hematuria.   Musculoskeletal:        No leg pain   All other systems reviewed and are negative.    Allergies:  No Known Allergies    Medications:    levetiracetam  ondansetron   oxycodone    Past Medical History:    Suicide attempt   Seizures     Family History:  Maternal grandfather: heart disease     Social History:  Presents alone  Physical Exam     Patient Vitals for the past 24 hrs:   BP Temp Temp src Pulse Resp SpO2   10/09/22 1300 123/74 -- -- 91 -- 99 %   10/09/22 1232 129/80 -- -- 93 -- 100 %   10/09/22 1202 129/86 -- -- 105 -- 91 %   10/09/22 1132 126/82 -- -- 98 -- 100 %   10/09/22 1102 121/80 -- -- 95 -- 100 %   10/09/22 1032 125/80 -- -- 96 -- 99 %   10/09/22 1002 115/76 -- -- 97 -- 100 %   10/09/22 0953 120/74 99  F (37.2  C) Oral 101 16 100 %       Physical Exam  General: Resting on the bed.  Head: No obvious trauma to head.  Ears, Nose,  Throat:  External ears normal.  Nose normal.    Eyes:  Conjunctivae clear.  Pupils are equal, round, and reactive.   Neck: Normal range of motion.  Neck supple.   CV: Regular rate and rhythm.  No murmurs.      Respiratory: Effort normal and breath sounds normal.  No wheezing or crackles.   Gastrointestinal: Soft.  No distension. There is mild right sided upper and lower abdominal tenderness.  There is no rigidity, no rebound and no guarding.   Musculoskeletal: Right costal margin anterior pain.  No crepitus or deformity noted.  No cva tenderness.     Skin: Skin is warm and dry.  No rash noted.         Emergency Department Course   ECG:  ECG taken at 1047  ECG results from 10/09/22   EKG 12-lead, tracing only     Value    Systolic Blood Pressure     Diastolic Blood Pressure     Ventricular Rate 94    Atrial Rate 94    HI Interval 136    QRS Duration 82        QTc 425    P Axis 57    R AXIS 46    T Axis 30    Interpretation ECG      Sinus rhythm  Normal ECG  When compared with ECG of 31-JUL-2022 23:06,  Vent. rate has increased BY  35 BPM         Imaging:  CT Chest Pulmonary Embolism w Contrast   Final Result   IMPRESSION:   1.  No evidence of pulmonary embolus or other acute abnormality in the chest.        Report per radiology    Laboratory:  Labs Ordered and Resulted from Time of ED Arrival to Time of ED Departure   ROUTINE UA WITH MICROSCOPIC REFLEX TO CULTURE - Abnormal       Result Value    Color Urine Yellow      Appearance Urine Slightly Cloudy (*)     Glucose Urine Negative      Bilirubin Urine Negative      Ketones Urine Negative      Specific Gravity Urine 1.033      Blood Urine Negative      pH Urine 6.0      Protein Albumin Urine 50 (*)     Urobilinogen Urine Normal      Nitrite Urine Negative      Leukocyte Esterase Urine Large (*)     Mucus Urine Present (*)     RBC Urine 0      WBC Urine 38 (*)     Squamous Epithelials Urine 12 (*)    D DIMER QUANTITATIVE - Abnormal    D-Dimer Quantitative 2.53  (*)    CBC WITH PLATELETS AND DIFFERENTIAL - Abnormal    WBC Count 8.8      RBC Count 4.45      Hemoglobin 11.3 (*)     Hematocrit 36.8      MCV 83      MCH 25.4 (*)     MCHC 30.7 (*)     RDW 15.9 (*)     Platelet Count        % Neutrophils 75      % Lymphocytes 16      % Monocytes 8      % Eosinophils 1      % Basophils 0      % Immature Granulocytes 0      NRBCs per 100 WBC 0      Absolute Neutrophils 6.6      Absolute Lymphocytes 1.4      Absolute Monocytes 0.7      Absolute Eosinophils 0.1      Absolute Basophils 0.0      Absolute Immature Granulocytes 0.0      Absolute NRBCs 0.0     HCG QUALITATIVE PREGNANCY - Normal    hCG Serum Qualitative Negative     HCG QUANTITATIVE PREGNANCY - Normal    hCG Quantitative <1     LIPASE - Normal    Lipase 17     COMPREHENSIVE METABOLIC PANEL - Normal    Sodium 138      Potassium 4.4      Chloride 102      Carbon Dioxide (CO2) 26      Anion Gap 10      Urea Nitrogen 11.6      Creatinine 0.69      Calcium 9.5      Glucose 89      Alkaline Phosphatase 63      AST 16      ALT 14      Protein Total 7.7      Albumin 4.1      Bilirubin Total 0.3      GFR Estimate >90     TROPONIN T, HIGH SENSITIVITY - Normal    Troponin T, High Sensitivity <6     RBC AND PLATELET MORPHOLOGY    Platelet Assessment        Value: Automated Count Confirmed. Platelet morphology is normal.    RBC Morphology Confirmed RBC Indices     URINE CULTURE       Emergency Department Course:    Reviewed:  I reviewed nursing notes, vitals, past medical history and Care Everywhere    Assessments:  1020 I obtained history and examined the patient as noted above.   1509 I rechecked the patient and explained findings.     Interventions:  1140 Tylenol 975 mg PO  1226 Toradol 15 mg IV    Disposition:  The patient was discharged to home.     Impression & Plan      Medical Decision Makin-year-old female presents with right-sided rib pain.  Vital signs are reassuring.  Broad differential was pursued including but not  limited to PE, ACS, arrhythmia, pneumonia, pneumothorax, effusion, cholecystitis, cholangitis, pancreatitis, etc.  CBC shows no leukocytosis or significant anemia.  BMP without acute electrolyte, metabolic or renal dysfunction.  LFTs and lipase are reassuring, not consistent with obstructive biliary process, hepatitis, pancreatitis.  Patient already had CT and ultrasound abdomen.  There is no pathology noted previously.  I do not think repeating abdominal imaging is of value today.  UA shows large leuk esterase but poor sample.  Patient denied dysuria or frequency.  Will await culture.  Pregnancy test is negative.  Patient had a false positive last week.  She has 2 negative pregnancy test today.  I do not suspect pregnancy.  EKG shows sinus rhythm, no acute ischemic changes.  Troponin is within normal limits.  Patient denies any other chest pain or shortness of breath.  She endorses more rib pain.  Dimer was elevated therefore CT PE scan was obtained.  CT shows no evidence of PE or other acute abnormality of the chest.  After Toradol patient is feeling improved.  Repeat exam is benign.  She has close follow-up tomorrow.  I feel at this time that she is reasonable for outpatient management.  Return precautions were discussed and patient was comfortable this plan.      Diagnosis:    ICD-10-CM    1. Right-sided chest wall pain  R07.89       2. Rib pain  R07.81       3. Abdominal pain, right upper quadrant  R10.11           Discharge Medications:  New Prescriptions    No medications on file     Scribe Disclosure:  Chayito CAVAZOSed, am serving as a scribe at 9:46 AM on 10/9/2022 to document services personally performed by China Key MD based on my observations and the provider's statements to me.      China Key MD  10/09/22 0652

## 2022-10-10 ENCOUNTER — HOSPITAL ENCOUNTER (EMERGENCY)
Facility: CLINIC | Age: 22
Discharge: HOME | End: 2022-10-10
Attending: EMERGENCY MEDICINE | Admitting: EMERGENCY MEDICINE
Payer: COMMERCIAL

## 2022-10-10 ENCOUNTER — APPOINTMENT (OUTPATIENT)
Dept: ULTRASOUND IMAGING | Facility: CLINIC | Age: 22
End: 2022-10-10
Attending: EMERGENCY MEDICINE
Payer: COMMERCIAL

## 2022-10-10 VITALS
SYSTOLIC BLOOD PRESSURE: 129 MMHG | HEART RATE: 54 BPM | BODY MASS INDEX: 25.42 KG/M2 | WEIGHT: 148.1 LBS | OXYGEN SATURATION: 100 % | TEMPERATURE: 98.1 F | DIASTOLIC BLOOD PRESSURE: 94 MMHG | RESPIRATION RATE: 18 BRPM

## 2022-10-10 DIAGNOSIS — R10.11 RUQ ABDOMINAL PAIN: ICD-10-CM

## 2022-10-10 DIAGNOSIS — A74.9 CHLAMYDIA: ICD-10-CM

## 2022-10-10 DIAGNOSIS — N73.0 PID (ACUTE PELVIC INFLAMMATORY DISEASE): ICD-10-CM

## 2022-10-10 LAB
ALBUMIN SERPL BCG-MCNC: 4.3 G/DL (ref 3.5–5.2)
ALP SERPL-CCNC: 66 U/L (ref 35–104)
ALT SERPL W P-5'-P-CCNC: 15 U/L (ref 10–35)
ANION GAP SERPL CALCULATED.3IONS-SCNC: 10 MMOL/L (ref 7–15)
AST SERPL W P-5'-P-CCNC: 17 U/L (ref 10–35)
ATRIAL RATE - MUSE: 94 BPM
BASOPHILS # BLD AUTO: 0 10E3/UL (ref 0–0.2)
BASOPHILS NFR BLD AUTO: 0 %
BILIRUB SERPL-MCNC: 0.3 MG/DL
BUN SERPL-MCNC: 13 MG/DL (ref 6–20)
CALCIUM SERPL-MCNC: 9.6 MG/DL (ref 8.6–10)
CHLORIDE SERPL-SCNC: 101 MMOL/L (ref 98–107)
CREAT SERPL-MCNC: 0.72 MG/DL (ref 0.51–0.95)
DEPRECATED HCO3 PLAS-SCNC: 28 MMOL/L (ref 22–29)
DIASTOLIC BLOOD PRESSURE - MUSE: NORMAL MMHG
EOSINOPHIL # BLD AUTO: 0.1 10E3/UL (ref 0–0.7)
EOSINOPHIL NFR BLD AUTO: 1 %
ERYTHROCYTE [DISTWIDTH] IN BLOOD BY AUTOMATED COUNT: 15.6 % (ref 10–15)
GFR SERPL CREATININE-BSD FRML MDRD: >90 ML/MIN/1.73M2
GLUCOSE SERPL-MCNC: 79 MG/DL (ref 70–99)
HCG SERPL QL: NEGATIVE
HCT VFR BLD AUTO: 37.8 % (ref 35–47)
HGB BLD-MCNC: 11.4 G/DL (ref 11.7–15.7)
HOLD SPECIMEN: NORMAL
IMM GRANULOCYTES # BLD: 0 10E3/UL
IMM GRANULOCYTES NFR BLD: 0 %
INTERPRETATION ECG - MUSE: NORMAL
LIPASE SERPL-CCNC: 34 U/L (ref 13–60)
LYMPHOCYTES # BLD AUTO: 1.4 10E3/UL (ref 0.8–5.3)
LYMPHOCYTES NFR BLD AUTO: 17 %
MCH RBC QN AUTO: 25 PG (ref 26.5–33)
MCHC RBC AUTO-ENTMCNC: 30.2 G/DL (ref 31.5–36.5)
MCV RBC AUTO: 83 FL (ref 78–100)
MONOCYTES # BLD AUTO: 0.9 10E3/UL (ref 0–1.3)
MONOCYTES NFR BLD AUTO: 10 %
NEUTROPHILS # BLD AUTO: 6 10E3/UL (ref 1.6–8.3)
NEUTROPHILS NFR BLD AUTO: 72 %
NRBC # BLD AUTO: 0 10E3/UL
NRBC BLD AUTO-RTO: 0 /100
P AXIS - MUSE: 57 DEGREES
PLATELET # BLD AUTO: 149 10E3/UL (ref 150–450)
POTASSIUM SERPL-SCNC: 4 MMOL/L (ref 3.4–5.3)
PR INTERVAL - MUSE: 136 MS
PROT SERPL-MCNC: 8.2 G/DL (ref 6.4–8.3)
QRS DURATION - MUSE: 82 MS
QT - MUSE: 340 MS
QTC - MUSE: 425 MS
R AXIS - MUSE: 46 DEGREES
RBC # BLD AUTO: 4.56 10E6/UL (ref 3.8–5.2)
SODIUM SERPL-SCNC: 139 MMOL/L (ref 136–145)
SYSTOLIC BLOOD PRESSURE - MUSE: NORMAL MMHG
T AXIS - MUSE: 30 DEGREES
VENTRICULAR RATE- MUSE: 94 BPM
WBC # BLD AUTO: 8.4 10E3/UL (ref 4–11)

## 2022-10-10 PROCEDURE — 83690 ASSAY OF LIPASE: CPT | Performed by: EMERGENCY MEDICINE

## 2022-10-10 PROCEDURE — 76705 ECHO EXAM OF ABDOMEN: CPT

## 2022-10-10 PROCEDURE — 80053 COMPREHEN METABOLIC PANEL: CPT | Performed by: EMERGENCY MEDICINE

## 2022-10-10 PROCEDURE — 250N000011 HC RX IP 250 OP 636: Performed by: EMERGENCY MEDICINE

## 2022-10-10 PROCEDURE — 84703 CHORIONIC GONADOTROPIN ASSAY: CPT | Performed by: EMERGENCY MEDICINE

## 2022-10-10 PROCEDURE — 99285 EMERGENCY DEPT VISIT HI MDM: CPT | Mod: 25

## 2022-10-10 PROCEDURE — 85025 COMPLETE CBC W/AUTO DIFF WBC: CPT | Performed by: EMERGENCY MEDICINE

## 2022-10-10 PROCEDURE — 36415 COLL VENOUS BLD VENIPUNCTURE: CPT | Performed by: EMERGENCY MEDICINE

## 2022-10-10 PROCEDURE — 250N000013 HC RX MED GY IP 250 OP 250 PS 637: Performed by: EMERGENCY MEDICINE

## 2022-10-10 PROCEDURE — 96365 THER/PROPH/DIAG IV INF INIT: CPT

## 2022-10-10 PROCEDURE — 93970 EXTREMITY STUDY: CPT

## 2022-10-10 PROCEDURE — 82040 ASSAY OF SERUM ALBUMIN: CPT | Performed by: EMERGENCY MEDICINE

## 2022-10-10 RX ORDER — ONDANSETRON 4 MG/1
4 TABLET, ORALLY DISINTEGRATING ORAL EVERY 8 HOURS PRN
Qty: 12 TABLET | Refills: 0 | Status: SHIPPED | OUTPATIENT
Start: 2022-10-10 | End: 2022-10-13

## 2022-10-10 RX ORDER — CEFTRIAXONE 500 MG/1
500 INJECTION, POWDER, FOR SOLUTION INTRAMUSCULAR; INTRAVENOUS ONCE
Status: COMPLETED | OUTPATIENT
Start: 2022-10-10 | End: 2022-10-10

## 2022-10-10 RX ORDER — METRONIDAZOLE 500 MG/1
500 TABLET ORAL ONCE
Status: COMPLETED | OUTPATIENT
Start: 2022-10-10 | End: 2022-10-10

## 2022-10-10 RX ORDER — DOXYCYCLINE 100 MG/1
100 CAPSULE ORAL ONCE
Status: COMPLETED | OUTPATIENT
Start: 2022-10-10 | End: 2022-10-10

## 2022-10-10 RX ORDER — DOXYCYCLINE 100 MG/1
100 TABLET ORAL 2 TIMES DAILY
Qty: 27 TABLET | Refills: 0 | Status: SHIPPED | OUTPATIENT
Start: 2022-10-10 | End: 2022-10-23

## 2022-10-10 RX ORDER — METRONIDAZOLE 500 MG/1
500 TABLET ORAL 2 TIMES DAILY
Qty: 27 TABLET | Refills: 0 | Status: SHIPPED | OUTPATIENT
Start: 2022-10-10 | End: 2022-10-23

## 2022-10-10 RX ADMIN — METRONIDAZOLE 500 MG: 500 TABLET ORAL at 21:25

## 2022-10-10 RX ADMIN — DOXYCYCLINE HYCLATE 100 MG: 100 CAPSULE ORAL at 19:17

## 2022-10-10 RX ADMIN — CEFTRIAXONE SODIUM 500 MG: 500 INJECTION, POWDER, FOR SOLUTION INTRAMUSCULAR; INTRAVENOUS at 21:25

## 2022-10-10 ASSESSMENT — ACTIVITIES OF DAILY LIVING (ADL)
ADLS_ACUITY_SCORE: 35
ADLS_ACUITY_SCORE: 35

## 2022-10-10 NOTE — ED NOTES
Rapid Assessment Note    History:   Nelsy Quinones is a 22 year old female who presents with abdominal pain, shortness of breath, and chest pain. The patient reports that she has had a week of abdominal pain, was at the ED on Wednesday and Sunday with a D-Dimer of 250 on Sunday, but had a negative CT of her chest. She states that she went to her primary today and her D-Dimer was 705, so they advised her to come to the ED again.    The patient also notes that she tested positive for chlamydia today, and is requesting treatment here today.     CT Chest Pulmonary Embolism w Contrast Impression 10/9/22  1.  No evidence of pulmonary embolus or other acute abnormality in the chest.    Report per radiology      Exam:   General:  No respiratory distress    Cardiovascular: Good cap refill.    Respiratory: Breathing non labored.     Musculoskeletal: No tenderness. No bony deformity.     Skin: No rashes or petechiae     Neurologic: non focal  ***    Psychiatric: Appropriate.    GI: Exquisite tenderness to palpation in her right upper quadrant.     Plan of Care:   I evaluated the patient and developed an initial plan of care. I discussed this plan and explained that I, or one of my partners, would be returning to complete the evaluation.     I, Hunter Chan, am serving as a scribe to document services personally performed by Drew Washington M, MD, based on my observations and the provider's statements to me.    10/10/2022  [employer]

## 2022-10-10 NOTE — RESULT ENCOUNTER NOTE
[facility] Emergency Dept discharge antibiotic (if prescribed): None  No changes in treatment per [facility] ED Lab Result Urine culture protocol.

## 2022-10-10 NOTE — ED TRIAGE NOTES
Patient was seen today for a followup to the ED and her d-dimer was elevated, patient was advised to come to the ED for a CT PE. Patient denies SOB, patient also denies leg pain. Patient reports ongoing pain in her right ribs.      Triage Assessment     Row Name 10/10/22 4926       Triage Assessment (Adult)    Airway WDL WDL       Respiratory WDL    Respiratory WDL WDL       Skin Circulation/Temperature WDL    Skin Circulation/Temperature WDL WDL       Cardiac WDL    Cardiac WDL WDL       Peripheral/Neurovascular WDL    Peripheral Neurovascular WDL WDL       Cognitive/Neuro/Behavioral WDL    Cognitive/Neuro/Behavioral WDL WDL

## 2022-10-11 LAB — BACTERIA UR CULT: NORMAL

## 2022-10-11 NOTE — RESULT ENCOUNTER NOTE
Final urine culture report is negative.  Adult Negative Urine culture parameters per protocol: Any # Urogenital single or mixed organism, <10,000 col/ml single organism (cath/midstream), and > 3 organisms (No susceptibilities performed).  [facility] Emergency Dept discharge antibiotic prescribed (If applicable): None  Treatment recommendations per [facility] ED Lab Result Urine Culture protocol.

## 2022-10-11 NOTE — ED PROVIDER NOTES
History     Chief Complaint:  Abdominal Pain and Abnormal Labs       HPI   Nelsy Quinones is a 22 year old female who presents with right upper quadrant abdominal pain.  This is third ED visit for this.  Patient was seen in clinic today and referred back to the ED due to persistently elevated D-dimer.  Patient had CT pulmonary angiogram yesterday.  Patient states that abdominal pain is in the right upper quadrant just below the right rib line.  She does have some pain with deep breathing.  No dysuria.  She notes some vaginal discharge however nothing purulent.  No nausea or vomiting.  No fevers.  Negative pregnancy test yesterday.    ROS:  Review of Systems  Positive abdominal pain, negative fever, negative nausea, negative vomiting, negative dysuria, positive vaginal discharge, positive right upper quadrant pain  A full 10 point ROS was completed.  Pertinent positives are noted in the HPI.  All other systems reviewed and negative.      Allergies:  No Known Allergies     Medications:      levETIRAcetam (KEPPRA) 500 MG tablet  ondansetron (ZOFRAN ODT) 4 MG ODT tab  oxyCODONE (ROXICODONE) 5 MG tablet        Past Medical History:    No past medical history on file.    Past Surgical History:    No past surgical history on file.  Denies past surgical history    Family History:    family history is not on file.    Social History:     PCP: No Ref-Primary, Physician     Physical Exam   Patient Vitals for the past 24 hrs:   BP Temp Temp src Pulse Resp SpO2 Weight   10/10/22 2124 (!) 129/94 -- -- 54 -- 100 % --   10/10/22 2017 -- -- -- -- -- 99 % --   10/10/22 2016 -- -- -- -- -- 100 % --   10/10/22 2015 -- -- -- -- -- 99 % --   10/10/22 2014 -- -- -- -- -- 99 % --   10/10/22 2013 -- -- -- -- -- 99 % --   10/10/22 2012 -- -- -- -- -- 100 % --   10/10/22 2011 -- -- -- -- -- 99 % --   10/10/22 2010 -- -- -- -- -- 99 % --   10/10/22 1952 105/74 -- -- -- -- -- --   10/10/22 1800 -- -- -- -- -- 100 % --   10/10/22 4296 -- --  -- -- -- 100 % --   10/10/22 1758 -- -- -- -- -- 100 % --   10/10/22 1757 -- -- -- -- -- 100 % --   10/10/22 1756 -- -- -- -- -- 100 % --   10/10/22 1755 -- -- -- -- -- 100 % --   10/10/22 1753 123/75 -- -- 89 -- -- --   10/10/22 1427 132/84 98.1  F (36.7  C) Oral 97 18 99 % 67.2 kg (148 lb 1.6 oz)        Physical Exam    Gen: alert  HEENT: PERRL, oropharynx clear  Neck: normal ROM  CV: RRR, no murmurs  Pulm: breath sounds equal, lungs clear  Abd: Soft, nontender in lower abdomen, diffuse tenderness along the right inferior costal margin  Back: no evidence of injury, no cva tenderness  MSK: no deformity, moves all extremities  Skin: no rash  Neuro: alert, appropriate conversation and interaction      Emergency Department Course       Imaging:  US Lower Extremity Venous Duplex Bilateral   Final Result   IMPRESSION:   1.  No deep venous thrombosis in the bilateral lower extremities.      US Abdomen Limited   Final Result   IMPRESSION:   1.  Normal limited abdominal ultrasound.               Report per radiology    Laboratory:  Labs Ordered and Resulted from Time of ED Arrival to Time of ED Departure   CBC WITH PLATELETS AND DIFFERENTIAL - Abnormal       Result Value    WBC Count 8.4      RBC Count 4.56      Hemoglobin 11.4 (*)     Hematocrit 37.8      MCV 83      MCH 25.0 (*)     MCHC 30.2 (*)     RDW 15.6 (*)     Platelet Count 149 (*)     % Neutrophils 72      % Lymphocytes 17      % Monocytes 10      % Eosinophils 1      % Basophils 0      % Immature Granulocytes 0      NRBCs per 100 WBC 0      Absolute Neutrophils 6.0      Absolute Lymphocytes 1.4      Absolute Monocytes 0.9      Absolute Eosinophils 0.1      Absolute Basophils 0.0      Absolute Immature Granulocytes 0.0      Absolute NRBCs 0.0     COMPREHENSIVE METABOLIC PANEL - Normal    Sodium 139      Potassium 4.0      Chloride 101      Carbon Dioxide (CO2) 28      Anion Gap 10      Urea Nitrogen 13.0      Creatinine 0.72      Calcium 9.6      Glucose 79       Alkaline Phosphatase 66      AST 17      ALT 15      Protein Total 8.2      Albumin 4.3      Bilirubin Total 0.3      GFR Estimate >90     LIPASE - Normal    Lipase 34     HCG QUALITATIVE PREGNANCY - Normal    hCG Serum Qualitative Negative        Interventions:  Medications   doxycycline hyclate (VIBRAMYCIN) capsule 100 mg (100 mg Oral Given 10/10/22 1917)   cefTRIAXone (ROCEPHIN) 500 mg vial to attach to  ml bag for ADULTS or NS 50 ml bag for PEDS (0 mg Intravenous Stopped 10/10/22 2152)   metroNIDAZOLE (FLAGYL) tablet 500 mg (500 mg Oral Given 10/10/22 2125)        Disposition:  The patient was discharged to home.         Medical Decision Making:  Third ED visit for abdominal pain.  Chlamydia positive in clinic today.  Discussed with patient differential diagnosis does include Giovanni-Mitchell Alfonso as cause right upper quadrant pain.  Because of this, will treat for PID.  Patient was referred here for concern for elevated D-dimer.  Patient had negative CT pulmonary angiogram yesterday.  She has no tachycardia hypoxia or respiratory distress therefore did not feel that repeat CT imaging of the chest was indicated.  Patient with prior negative CT of the abdomen.  Right upper ultrasound was negative today.  No DVT on lower extremity Dopplers.  Her abdominal exam is benign.  I do not feel that she required repeat CT imaging of the abdomen.  Rather, will treat for PID.  Discussed abstinence from intercourse and partner testing and treatment.  Patient stressed understanding.  Medications as noted below.  Close outpatient primary care follow-up.  Return for any worsening    Diagnosis:    ICD-10-CM    1. RUQ abdominal pain  R10.11       2. Chlamydia  A74.9       3. PID (acute pelvic inflammatory disease)  N73.0            Discharge Medications:  Discharge Medication List as of 10/10/2022 10:08 PM      START taking these medications    Details   doxycycline monohydrate (ADOXA) 100 MG tablet Take 1 tablet (100 mg) by  mouth 2 times daily for 27 doses, Disp-27 tablet, R-0, E-Prescribe      metroNIDAZOLE (FLAGYL) 500 MG tablet Take 1 tablet (500 mg) by mouth 2 times daily for 27 doses, Disp-27 tablet, R-0, E-PrescribeEat yogurt or cottage cheese daily to prevent diarrhea that can be caused by taking this medication.      !! ondansetron (ZOFRAN ODT) 4 MG ODT tab Take 1 tablet (4 mg) by mouth every 8 hours as needed for nausea or vomiting, Disp-12 tablet, R-0, E-Prescribe       !! - Potential duplicate medications found. Please discuss with provider.           10/11/2022   MD Araseli Walton Kristi Jo Schneider, MD  10/11/22 0128

## 2022-11-19 ENCOUNTER — HEALTH MAINTENANCE LETTER (OUTPATIENT)
Age: 22
End: 2022-11-19

## 2023-02-18 PROCEDURE — C9803 HOPD COVID-19 SPEC COLLECT: HCPCS

## 2023-02-18 PROCEDURE — 99283 EMERGENCY DEPT VISIT LOW MDM: CPT | Mod: CS

## 2023-02-19 ENCOUNTER — HOSPITAL ENCOUNTER (EMERGENCY)
Facility: CLINIC | Age: 23
Discharge: HOME | End: 2023-02-19
Attending: EMERGENCY MEDICINE | Admitting: EMERGENCY MEDICINE
Payer: COMMERCIAL

## 2023-02-19 VITALS
RESPIRATION RATE: 20 BRPM | SYSTOLIC BLOOD PRESSURE: 129 MMHG | OXYGEN SATURATION: 99 % | TEMPERATURE: 97.8 F | DIASTOLIC BLOOD PRESSURE: 73 MMHG | HEART RATE: 84 BPM

## 2023-02-19 DIAGNOSIS — R51.9 ACUTE NONINTRACTABLE HEADACHE, UNSPECIFIED HEADACHE TYPE: ICD-10-CM

## 2023-02-19 LAB
FLUAV RNA SPEC QL NAA+PROBE: NEGATIVE
FLUBV RNA RESP QL NAA+PROBE: NEGATIVE
RSV RNA SPEC NAA+PROBE: NEGATIVE
SARS-COV-2 RNA RESP QL NAA+PROBE: NEGATIVE

## 2023-02-19 PROCEDURE — 87637 SARSCOV2&INF A&B&RSV AMP PRB: CPT | Performed by: EMERGENCY MEDICINE

## 2023-02-19 PROCEDURE — 250N000013 HC RX MED GY IP 250 OP 250 PS 637: Performed by: EMERGENCY MEDICINE

## 2023-02-19 RX ORDER — METOCLOPRAMIDE 5 MG/1
10 TABLET ORAL ONCE
Status: COMPLETED | OUTPATIENT
Start: 2023-02-19 | End: 2023-02-19

## 2023-02-19 RX ORDER — ACETAMINOPHEN 325 MG/1
975 TABLET ORAL ONCE
Status: COMPLETED | OUTPATIENT
Start: 2023-02-19 | End: 2023-02-19

## 2023-02-19 RX ORDER — DIPHENHYDRAMINE HCL 25 MG
25 CAPSULE ORAL ONCE
Status: COMPLETED | OUTPATIENT
Start: 2023-02-19 | End: 2023-02-19

## 2023-02-19 RX ADMIN — ACETAMINOPHEN 975 MG: 325 TABLET ORAL at 02:07

## 2023-02-19 RX ADMIN — METOCLOPRAMIDE 10 MG: 5 TABLET ORAL at 02:07

## 2023-02-19 RX ADMIN — DIPHENHYDRAMINE HYDROCHLORIDE 25 MG: 25 CAPSULE ORAL at 02:07

## 2023-02-19 ASSESSMENT — ENCOUNTER SYMPTOMS
LIGHT-HEADEDNESS: 1
HEADACHES: 1
VOMITING: 1
NAUSEA: 1
SORE THROAT: 1
COUGH: 0
ABDOMINAL PAIN: 0
WEAKNESS: 0
NUMBNESS: 0
FEVER: 1
DIZZINESS: 0

## 2023-02-19 ASSESSMENT — ACTIVITIES OF DAILY LIVING (ADL): ADLS_ACUITY_SCORE: 35

## 2023-02-19 NOTE — ED TRIAGE NOTES
Pt presents for complaint of on going fevers, headaches and sore throat. Pt states she was diagnosed with strep on Wednesday. States she is on her third day of treatment for strep and states symptoms have worsened. Additionally notes she is 10 weeks and 4 days into a pregnancy. ABC intact, A&Ox4.     Triage Assessment     Row Name 02/18/23 5386       Triage Assessment (Adult)    Airway WDL WDL       Respiratory WDL    Respiratory WDL WDL       Skin Circulation/Temperature WDL    Skin Circulation/Temperature WDL WDL       Cardiac WDL    Cardiac WDL WDL       Peripheral/Neurovascular WDL    Peripheral Neurovascular WDL WDL       Cognitive/Neuro/Behavioral WDL    Cognitive/Neuro/Behavioral WDL WDL

## 2023-02-19 NOTE — DISCHARGE INSTRUCTIONS
What do you do next:   Continue your home medications unless we have specifically changed them  You can use over-the-counter acetaminophen (Tylenol ) for pain control for the next 2 to 3 days.  Acetaminophen (Tylenol): Take 500 to 1000 mg by mouth every 6 hours as needed for fever or pain.  Do not take more than 4000 total milligrams of acetaminophen-containing products in a 24-hour timeframe.  Follow up as indicated below    When do you return: If you have uncontrollable pain, intractable vomiting, trouble swallowing or speaking, or any other symptoms that concern you, please return to the ED for reevaluation.    Thank you for allowing us to care for you today.

## 2023-02-19 NOTE — ED PROVIDER NOTES
History     Chief Complaint:  Fever       HPI   Nelsy Quinones is a 22 year old female with a history of seizure disorder who presents with fever. The patient reports an onset of a pressure-like headache five days ago that has been constant, rated at a 7-8/10. She went to the urgency room the day after onset, and tested positive for strep and negative for Covid after her boyfriend tested positive for Covid. She was not having a sore throat at that time, but developed one yesterday. Additional symptoms of congestion, light-headedness, nausea, vomiting, last episode two days ago, and a fever of 102.3 around 2245 last night. The headache has migrated to her eyes, primarily on the left side. She took Tylenol around 2230 last night. No vision changes, weakness, numbness, dizziness, cough, or abdominal pain. She has not had nausea or vomiting in her pregnancy.     Independent Historian:   None - Patient Only    Review of External Notes: OB Office visit 2/14/23 (UTI, keflex ordered); Urgency room 2/15/23 - strep diagnosed, different Abx ordered to cover urine and strep.    ROS:  Review of Systems   Constitutional: Positive for fever.   HENT: Positive for congestion and sore throat.    Eyes: Negative for visual disturbance.   Respiratory: Negative for cough.    Gastrointestinal: Positive for nausea and vomiting. Negative for abdominal pain.   Neurological: Positive for light-headedness and headaches. Negative for dizziness, weakness and numbness.       Allergies:  No known drug allergies     Medications:    Keppra  Cefadroxil   Cephalexin     Past Medical History:    Seizure disorder  Recurrent major depressive disorder  Anemia  Mood disorder  Hyperopia  Esotropia     Family History:    Mother- iron deficiency, diabetes mellitus, hypertension     Social History:  The patient presents via private vehicle  Not Covid vaccinated  Works at     Physical Exam     Patient Vitals for the past 24 hrs:   BP Temp Pulse  Resp SpO2   02/19/23 0055 129/73 -- 84 20 99 %   02/18/23 2224 125/74 97.8  F (36.6  C) 87 16 99 %        Physical Exam  Constitutional: Vital signs reviewed as above.   Head: No external signs of trauma. No lesions noted.  Eyes: PEERL, EOMI B/L, no pain or limitation of superior gaze  ENT:   Ears: Normal B/L TM and external canals   Nose: Noncongested, no exudates. No rhinorrhea. No FB noted   Mouth/Throat:     Mucous membranes are moist and normal.     No Oropharyngeal exudate. No oropharyngeal erythema noted.    No tonsilar swelling noted.     No uvular deviation noted.    No swelling noted on the floor of the mouth  Neck: FROM. Neck is supple  Cardiovascular: normal rate, Regular rhythm and normal heart sounds.  No murmur heard. Equal B/L peripheral pulses.  Pulmonary/Chest: Effort normal and breath sounds normal. No respiratory distress. Patient has no wheezes. Patient has no rales.   Gastrointestinal: Soft. There is no tenderness.   Musculoskeletal/Extremities: No deformities noted. Normal tone.  Neurological:    Patient is alert and oriented to person, place, and time.    Speech is fluent, cognition is normal.   CN 2-12 intact (PERRL, EOMI, symmetric smile, equal eye squeeze and forehead raise, normal and equal sensation to bilateral forehead/cheek/chin, grossly equal hearing B/L, midline tongue protrusion with nl side-to-side movement, normal shoulder shrug).    RUE strength 5/5: , finger abd, wrist flex/ext, elbow flex/ext.    LUE strength 5/5: , finger abd, wrist flex/ext, elbow flex/ext.    RLE strength 5/5: ankle flex/ext, knee flex/ext, hip flex.    LLE strength 5/5: ankle flex/ext, knee flex/ext, hip flex.    Sensation equal in all 4 extremities.    No arm drift.     Cerebellar: Normal rapid alternating movements     ( finger-nose-finger, rapid pronation/supination, hand rolling)    Normal heel-to-shin  Skin: Skin is warm and dry. There is no diaphoresis noted.   Psychiatric: The patient  appears calm.          Emergency Department Course     Laboratory:  Labs Ordered and Resulted from Time of ED Arrival to Time of ED Departure   INFLUENZA A/B & SARS-COV2 PCR MULTIPLEX - Normal       Result Value    Influenza A PCR Negative      Influenza B PCR Negative      RSV PCR Negative      SARS CoV2 PCR Negative          Procedures       Emergency Department Course & Assessments:     Interventions:  Medications   metoclopramide (REGLAN) tablet 10 mg (10 mg Oral Given 2/19/23 0207)   diphenhydrAMINE (BENADRYL) capsule 25 mg (25 mg Oral Given 2/19/23 0207)   acetaminophen (TYLENOL) tablet 975 mg (975 mg Oral Given 2/19/23 0207)        Assessments/Independent Interpretation/Consultations/Discussion of Management or Tests:  ED Course as of 02/19/23 0547   Sun Feb 19, 2023   0138 Dr. Soares' evaluation.    0254 Rechecked. Patient states she needs to leave.        Social Determinants of Health affecting care:   None    Disposition:  The patient was discharged to home.     Impression & Plan      Medical Decision Making:  This 22-year-old female patient presents to the ED for headache.  Please see the HPI and exam for specifics.  The patient was retested for COVID as she had been exposed to that but, fortunately, was negative.  She is currently taking her antibiotics but states she has only been doing so for about 2 days.  Her headache seems somewhat better with medications in the emergency department.  I felt she was safe for discharge and she can follow closely in the outpatient setting.    Diagnosis:    ICD-10-CM    1. Acute nonintractable headache, unspecified headache type  R51.9            Discharge Medications:  Discharge Medication List as of 2/19/2023  2:53 AM         Scribe Disclosure:  I, Marnie Jacobsen, am serving as a scribe at 1:04 AM on 2/19/2023 to document services personally performed by Kirill Soares DO based on my observations and the provider's statements to me.      2/19/2023   Rodger  DO Rodger Clark, Kirill Harrison DO  02/19/23 0590

## 2023-04-06 ENCOUNTER — HOSPITAL ENCOUNTER (OUTPATIENT)
Facility: CLINIC | Age: 23
End: 2023-04-06
Admitting: OBSTETRICS & GYNECOLOGY
Payer: COMMERCIAL

## 2023-04-06 ENCOUNTER — HOSPITAL ENCOUNTER (OUTPATIENT)
Facility: CLINIC | Age: 23
Discharge: HOME | End: 2023-04-06
Attending: OBSTETRICS & GYNECOLOGY | Admitting: OBSTETRICS & GYNECOLOGY
Payer: COMMERCIAL

## 2023-04-06 VITALS
SYSTOLIC BLOOD PRESSURE: 116 MMHG | BODY MASS INDEX: 26.98 KG/M2 | OXYGEN SATURATION: 100 % | DIASTOLIC BLOOD PRESSURE: 66 MMHG | WEIGHT: 158 LBS | HEIGHT: 64 IN | RESPIRATION RATE: 20 BRPM | TEMPERATURE: 99.7 F

## 2023-04-06 PROBLEM — Z36.89 ENCOUNTER FOR TRIAGE IN PREGNANT PATIENT: Status: ACTIVE | Noted: 2023-04-06

## 2023-04-06 LAB
ALBUMIN UR-MCNC: NEGATIVE MG/DL
APPEARANCE UR: CLEAR
BACTERIA #/AREA URNS HPF: ABNORMAL /HPF
BILIRUB UR QL STRIP: NEGATIVE
CLUE CELLS: ABNORMAL
COLOR UR AUTO: ABNORMAL
FLUAV RNA SPEC QL NAA+PROBE: NEGATIVE
FLUBV RNA RESP QL NAA+PROBE: NEGATIVE
GLUCOSE UR STRIP-MCNC: NEGATIVE MG/DL
HGB UR QL STRIP: NEGATIVE
KETONES UR STRIP-MCNC: >150 MG/DL
LEUKOCYTE ESTERASE UR QL STRIP: ABNORMAL
MUCOUS THREADS #/AREA URNS LPF: PRESENT /LPF
NITRATE UR QL: NEGATIVE
PH UR STRIP: 7 [PH] (ref 5–7)
RBC URINE: 1 /HPF
RSV RNA SPEC NAA+PROBE: NEGATIVE
SARS-COV-2 RNA RESP QL NAA+PROBE: NEGATIVE
SP GR UR STRIP: 1.01 (ref 1–1.03)
SQUAMOUS EPITHELIAL: 7 /HPF
TRICHOMONAS, WET PREP: ABNORMAL
UROBILINOGEN UR STRIP-MCNC: NORMAL MG/DL
WBC URINE: 2 /HPF
WBC'S/HIGH POWER FIELD, WET PREP: ABNORMAL
YEAST, WET PREP: ABNORMAL

## 2023-04-06 PROCEDURE — 81003 URINALYSIS AUTO W/O SCOPE: CPT | Performed by: OBSTETRICS & GYNECOLOGY

## 2023-04-06 PROCEDURE — 87210 SMEAR WET MOUNT SALINE/INK: CPT | Performed by: OBSTETRICS & GYNECOLOGY

## 2023-04-06 PROCEDURE — 250N000013 HC RX MED GY IP 250 OP 250 PS 637: Performed by: OBSTETRICS & GYNECOLOGY

## 2023-04-06 PROCEDURE — 87637 SARSCOV2&INF A&B&RSV AMP PRB: CPT | Performed by: OBSTETRICS & GYNECOLOGY

## 2023-04-06 PROCEDURE — G0463 HOSPITAL OUTPT CLINIC VISIT: HCPCS

## 2023-04-06 PROCEDURE — 87086 URINE CULTURE/COLONY COUNT: CPT | Performed by: OBSTETRICS & GYNECOLOGY

## 2023-04-06 RX ORDER — PRENATAL VIT/IRON FUM/FOLIC AC 27MG-0.8MG
1 TABLET ORAL DAILY
Status: ON HOLD | COMMUNITY
End: 2023-08-10

## 2023-04-06 RX ORDER — LIDOCAINE 40 MG/G
CREAM TOPICAL
Status: DISCONTINUED | OUTPATIENT
Start: 2023-04-06 | End: 2023-04-06 | Stop reason: HOSPADM

## 2023-04-06 RX ORDER — ACETAMINOPHEN 325 MG/1
975 TABLET ORAL ONCE
Status: COMPLETED | OUTPATIENT
Start: 2023-04-06 | End: 2023-04-06

## 2023-04-06 RX ADMIN — ACETAMINOPHEN 975 MG: 325 TABLET ORAL at 20:10

## 2023-04-06 ASSESSMENT — ACTIVITIES OF DAILY LIVING (ADL): ADLS_ACUITY_SCORE: 31

## 2023-04-06 NOTE — PLAN OF CARE
Patient c/o sharp chest pain (8/10) on her right side of her chest when sitting upright. Lung sounds clear, bilaterally. Pulse currently 120 bpm and O2 sats %. Patient eating and drinking - chest pain has subsided. Patient states she feels extremely cold (temp 98.5) - blankets provided and room temp increased.

## 2023-04-06 NOTE — PROVIDER NOTIFICATION
04/06/23 1837   Provider Notification   Provider Name/Title Dr. Parikh   Method of Notification Electronic Page   Notification Reason Lab/Diagnostic Study;Status Update     MD notified of patient status after attempts to orally hydrate and eat some food and UA results.      VORB to collect a wet prep and continue to encourage oral hydration. Notify MD w/ results.

## 2023-04-06 NOTE — PROVIDER NOTIFICATION
23 1729   Provider Notification   Provider Name/Title Dr Parikh   Method of Notification Phone   Request Evaluate - Remote   Notification Reason Status Update   Updated re: pt arrival with c/o head cold symptoms, lightheaded feeling, heart racing, sharp lower abdominal pain x2, and feeling like she was going to pass out. Pt is 17w1d, , prior delivery was vaginal 15 months ago at term. Hx seizure disorder on keppra, depression with suicide attempt in August, and thrombocytopenia. Abdomen feels soft, pt states abdominal pain was sharp and did not feel like menstural cramps or labor contractions. Fetal doptones 170. Orders for UA, give food and drink, and to call Dr Parikh back with update.

## 2023-04-07 NOTE — DISCHARGE INSTRUCTIONS
Discharge Instruction for Undelivered Patients      You were seen for:  Dizziness, headache  We Consulted: Dr. Parikh  You had (Test or Medicine):Wet prep swab, urinalysis, vital signs monitoring, fetal heart rate check     Diet:   Drink 8 to 12 glasses of liquids (milk, juice, water) every day.  You may eat meals and snacks.     Activity:  Call your doctor or nurse midwife if your baby is moving less than usual.     Call your provider if you notice:  Swelling in your face or increased swelling in your hands or legs.  Headaches that are not relieved by Tylenol (acetaminophen).  Changes in your vision (blurring: seeing spots or stars.)  Nausea (sick to your stomach) and vomiting (throwing up).   Weight gain of 5 pounds or more per week.  Heartburn that doesn't go away.  Signs of bladder infection: pain when you urinate (use the toilet), need to go more often and more urgently.  The bag of dejesus (rupture of membranes) breaks, or you notice leaking in your underwear.  Bright red blood in your underwear.  Abdominal (lower belly) or stomach pain.  For first baby: Contractions (tightening) less than 5 minutes apart for one hour or more.  Second (plus) baby: Contractions (tightening) less than 10 minutes apart and getting stronger.  *If less than 34 weeks: Contractions (tightening) more than 6 times in one hour.  Increase or change in vaginal discharge (note the color and amount)    Follow-up:  As scheduled in the clinic

## 2023-04-07 NOTE — PROVIDER NOTIFICATION
"   04/06/23 1920   Provider Notification   Provider Name/Title Dr. Parikh   Method of Notification Phone   Request Evaluate - Remote   Notification Reason Lab/Diagnostic Study;Status Update     RN updated MD that wet prep resulted WNL. Patient is still feeling a \"bad headache\" and \"hot in the head.\" She reports that her child and child's father have both had fevers and headaches over the past week, and her headache started last night. Of note, patient works in a  and \"lots of the kids have been sick lately.\" Patient also reports that over the past few days, she began a diet of \"only fruits, vegetables, water and 100% fruit juice.\" When asked why she began this diet, patient stated that she \"just wanted to.\" RN provided education that a balanced, nourishing diet is important during pregnancy, especially when considering patient's anemia and the fact that she's not feeling well. Patient agreed to eat peanut butter toast and apple sauce. Peppermint oil offered for headache.     Order received to offer 975mg tylenol and COVID swab. If VS are stable and doptones WNL, patient may discharge home. MD does not request blood work or further labs before discharge. RN will update MD with any concerns.   "

## 2023-04-07 NOTE — PLAN OF CARE
Data: Patient assessed in the [facility] for dizziness.  Cervical exam not examined.  Membranes intact.  Contractions not occurring.  Action:  Presumed adequate fetal oxygenation documented (see flow record). Discharge instructions reviewed.  Patient instructed to report change in fetal movement, vaginal leaking of fluid or bleeding, abdominal pain, or any concerns related to the pregnancy to her nurse/physician.    Response: Orders to discharge home per Dr. Parikh.  Patient verbalized understanding of education and verbalized agreement with plan. Discharged to home at 2015.

## 2023-04-08 LAB — BACTERIA UR CULT: NORMAL

## 2023-06-12 ENCOUNTER — HOSPITAL ENCOUNTER (OUTPATIENT)
Facility: CLINIC | Age: 23
Discharge: HOME | End: 2023-06-12
Attending: OBSTETRICS & GYNECOLOGY | Admitting: OBSTETRICS & GYNECOLOGY
Payer: COMMERCIAL

## 2023-06-12 ENCOUNTER — HOSPITAL ENCOUNTER (OUTPATIENT)
Facility: CLINIC | Age: 23
End: 2023-06-12
Admitting: OBSTETRICS & GYNECOLOGY
Payer: COMMERCIAL

## 2023-06-12 VITALS — TEMPERATURE: 98.3 F | SYSTOLIC BLOOD PRESSURE: 131 MMHG | DIASTOLIC BLOOD PRESSURE: 69 MMHG | RESPIRATION RATE: 18 BRPM

## 2023-06-12 PROCEDURE — G0463 HOSPITAL OUTPT CLINIC VISIT: HCPCS

## 2023-06-12 RX ORDER — LIDOCAINE 40 MG/G
CREAM TOPICAL
Status: DISCONTINUED | OUTPATIENT
Start: 2023-06-12 | End: 2023-06-12 | Stop reason: HOSPADM

## 2023-06-12 RX ORDER — PEDI MULTIVIT 158/IRON/VIT K1 18MG-10MCG
TABLET,CHEWABLE ORAL
COMMUNITY
Start: 2023-02-06 | End: 2023-07-25

## 2023-06-12 NOTE — DISCHARGE INSTRUCTIONS
Discharge Instruction for Undelivered Patients      You were seen for:  Dizziness, DFM, and LLQ pain  We Consulted: Dr. Almendarez  You had (Test or Medicine):fetal and uterine monitoring     Diet:   Drink 8 to 12 glasses of liquids (milk, juice, water) every day.     Activity:  Call your doctor or nurse midwife if your baby is moving less than usual.     Call your provider if you notice:  Swelling in your face or increased swelling in your hands or legs.  Headaches that are not relieved by Tylenol (acetaminophen).  Changes in your vision (blurring: seeing spots or stars.)  Nausea (sick to your stomach) and vomiting (throwing up).   Weight gain of 5 pounds or more per week.  Heartburn that doesn't go away.  Signs of bladder infection: pain when you urinate (use the toilet), need to go more often and more urgently.  The bag of dejesus (rupture of membranes) breaks, or you notice leaking in your underwear.  Bright red blood in your underwear.  Abdominal (lower belly) or stomach pain.  Second (plus) baby: Contractions (tightening) less than 10 minutes apart and getting stronger.  *If less than 34 weeks: Contractions (tightening) more than 6 times in one hour.  Increase or change in vaginal discharge (note the color and amount)      Follow-up:  As scheduled in the clinic

## 2023-06-12 NOTE — PROVIDER NOTIFICATION
06/12/23 1616   Provider Notification   Provider Name/Title Dr. Almendarez   Method of Notification Phone     Informed MD of pt's arrival (see triage note for details on pt complaint). FHR tracing: baseline of 160 bpm with accelerations, one variable, and moderate variability. Pt recently had hgb drawn on 6/5/23 that was 9.8. Orders received from MD to offer pt a liter of fluids for hydration. MD medina recommended that pt add an iron supplement. Pt reported that she has an OB appointment in the next 2 weeks and MD stated that was okay to follow up in that time frame, but to not go longer than 2 weeks without having an appointment. Will call MD if needed.

## 2023-06-12 NOTE — PROVIDER NOTIFICATION
06/12/23 1640   Provider Notification   Provider Name/Title Dr. Almendarez   Method of Notification Phone     Pt declined the IV fluids and feels she can hydrate at home. Orders received from MD to discharge to home.

## 2023-06-12 NOTE — PLAN OF CARE
Data: Patient presented to [facility]: 2023  3:21 PM.  Reason for maternal/fetal assessment is dizziness, shortness of breath, and DFM. Patient reports that last week she started feeling short of breath when walking even a short distance. Pt reported that she has also been dizzy & the dizziness ranges from mildly dizzy to what the pt stated as a dreamy feeling almost like when you are about to pass out. Pt states that she has not passed out at all. She reports that she occasionally gets a dull headache across her forehead. She has been trying to stay hydrated. Pt also reported that she hadn't felt baby move in a few hours, but started feeling him move when she got to our unit. Pt reported that she had a sharp LLQ pain about 2 hours ago but it only lasted for a few minutes. Patient is a .  Prenatal record reviewed. Pregnancy  has been complicated by urinary tract infections and a seizure disorder.  Gestational Age 26w6d. VSS. Fetal movement active. Patient denies uterine contractions, leaking of vaginal fluid/rupture of membranes, vaginal bleeding, abdominal pain, pelvic pressure, nausea, vomiting, epigastric or URQ pain, significant edema. Support person is not present.   Action: Verbal consent for EFM. Triage assessment completed. Bill of rights reviewed.  Response: Patient verbalized agreement with plan. Will contact Dr Kyra Almendarez with update and for further orders.

## 2023-06-12 NOTE — PLAN OF CARE
Data: Patient assessed in the [facility] for dizziness, DFM, and LLQ. Membranes intact.  Contractions/uterine assessment: pt reported not feeling any contractions and none were picked up on the monitor.  Action:  Presumed adequate fetal oxygenation documented (see flow record). Discharge instructions reviewed.  Patient instructed to report change in fetal movement, vaginal leaking of fluid or bleeding, abdominal pain, or any concerns related to the pregnancy to her nurse/physician.    Response: Orders to discharge home per Kyra Almendarez.  Patient verbalized understanding of education and verbalized agreement with plan. Discharged to home at 1645.

## 2023-06-28 ENCOUNTER — HOSPITAL ENCOUNTER (EMERGENCY)
Facility: CLINIC | Age: 23
Discharge: HOME | End: 2023-06-29
Attending: EMERGENCY MEDICINE
Payer: COMMERCIAL

## 2023-06-28 DIAGNOSIS — R06.02 SHORTNESS OF BREATH: ICD-10-CM

## 2023-06-28 DIAGNOSIS — R53.81 MALAISE AND FATIGUE: ICD-10-CM

## 2023-06-28 DIAGNOSIS — D64.9 ANEMIA, UNSPECIFIED TYPE: ICD-10-CM

## 2023-06-28 DIAGNOSIS — R53.83 MALAISE AND FATIGUE: ICD-10-CM

## 2023-06-28 DIAGNOSIS — Z34.83 ENCOUNTER FOR SUPERVISION OF OTHER NORMAL PREGNANCY IN THIRD TRIMESTER: ICD-10-CM

## 2023-06-28 LAB
ALBUMIN MFR UR ELPH: 22.2 MG/DL
ALBUMIN SERPL BCG-MCNC: 3.6 G/DL (ref 3.5–5.2)
ALBUMIN UR-MCNC: 30 MG/DL
ALP SERPL-CCNC: 66 U/L (ref 35–104)
ALT SERPL W P-5'-P-CCNC: 11 U/L (ref 0–50)
ANION GAP SERPL CALCULATED.3IONS-SCNC: 13 MMOL/L (ref 7–15)
APPEARANCE UR: CLEAR
AST SERPL W P-5'-P-CCNC: 23 U/L (ref 0–45)
BACTERIA #/AREA URNS HPF: ABNORMAL /HPF
BASOPHILS # BLD AUTO: 0 10E3/UL (ref 0–0.2)
BASOPHILS NFR BLD AUTO: 0 %
BILIRUB DIRECT SERPL-MCNC: <0.2 MG/DL (ref 0–0.3)
BILIRUB SERPL-MCNC: <0.2 MG/DL
BILIRUB UR QL STRIP: NEGATIVE
BUN SERPL-MCNC: 8.7 MG/DL (ref 6–20)
CALCIUM SERPL-MCNC: 8.7 MG/DL (ref 8.6–10)
CHLORIDE SERPL-SCNC: 104 MMOL/L (ref 98–107)
COLOR UR AUTO: YELLOW
CREAT SERPL-MCNC: 0.56 MG/DL (ref 0.51–0.95)
CREAT UR-MCNC: 179.5 MG/DL
D DIMER PPP FEU-MCNC: 2.51 UG/ML FEU (ref 0–0.5)
DEPRECATED HCO3 PLAS-SCNC: 18 MMOL/L (ref 22–29)
EOSINOPHIL # BLD AUTO: 0.2 10E3/UL (ref 0–0.7)
EOSINOPHIL NFR BLD AUTO: 1 %
ERYTHROCYTE [DISTWIDTH] IN BLOOD BY AUTOMATED COUNT: 14.6 % (ref 10–15)
FLUAV RNA SPEC QL NAA+PROBE: NEGATIVE
FLUBV RNA RESP QL NAA+PROBE: NEGATIVE
GFR SERPL CREATININE-BSD FRML MDRD: >90 ML/MIN/1.73M2
GLUCOSE SERPL-MCNC: 168 MG/DL (ref 70–99)
GLUCOSE UR STRIP-MCNC: >=1000 MG/DL
HCG INTACT+B SERPL-ACNC: 5295 MIU/ML
HCT VFR BLD AUTO: 29.2 % (ref 35–47)
HGB BLD-MCNC: 9.3 G/DL (ref 11.7–15.7)
HGB UR QL STRIP: NEGATIVE
HOLD SPECIMEN: NORMAL
HOLD SPECIMEN: NORMAL
HYALINE CASTS: 1 /LPF
IMM GRANULOCYTES # BLD: 0.1 10E3/UL
IMM GRANULOCYTES NFR BLD: 1 %
KETONES UR STRIP-MCNC: 10 MG/DL
LEUKOCYTE ESTERASE UR QL STRIP: NEGATIVE
LYMPHOCYTES # BLD AUTO: 2 10E3/UL (ref 0.8–5.3)
LYMPHOCYTES NFR BLD AUTO: 18 %
MCH RBC QN AUTO: 26.1 PG (ref 26.5–33)
MCHC RBC AUTO-ENTMCNC: 31.8 G/DL (ref 31.5–36.5)
MCV RBC AUTO: 82 FL (ref 78–100)
MONOCYTES # BLD AUTO: 0.8 10E3/UL (ref 0–1.3)
MONOCYTES NFR BLD AUTO: 7 %
MUCOUS THREADS #/AREA URNS LPF: PRESENT /LPF
NEUTROPHILS # BLD AUTO: 8.3 10E3/UL (ref 1.6–8.3)
NEUTROPHILS NFR BLD AUTO: 73 %
NITRATE UR QL: NEGATIVE
NRBC # BLD AUTO: 0 10E3/UL
NRBC BLD AUTO-RTO: 0 /100
NT-PROBNP SERPL-MCNC: <36 PG/ML (ref 0–450)
PH UR STRIP: 6.5 [PH] (ref 5–7)
PLATELET # BLD AUTO: 128 10E3/UL (ref 150–450)
POTASSIUM SERPL-SCNC: 3.5 MMOL/L (ref 3.4–5.3)
PROT SERPL-MCNC: 6.7 G/DL (ref 6.4–8.3)
PROT/CREAT 24H UR: 0.12 MG/MG CR (ref 0–0.2)
RBC # BLD AUTO: 3.56 10E6/UL (ref 3.8–5.2)
RBC URINE: 4 /HPF
RSV RNA SPEC NAA+PROBE: NEGATIVE
SARS-COV-2 RNA RESP QL NAA+PROBE: NEGATIVE
SODIUM SERPL-SCNC: 135 MMOL/L (ref 136–145)
SP GR UR STRIP: 1.01 (ref 1–1.03)
SQUAMOUS EPITHELIAL: 17 /HPF
TROPONIN T SERPL HS-MCNC: <6 NG/L
UROBILINOGEN UR STRIP-MCNC: NORMAL MG/DL
WBC # BLD AUTO: 11.3 10E3/UL (ref 4–11)
WBC URINE: 3 /HPF

## 2023-06-28 PROCEDURE — 82248 BILIRUBIN DIRECT: CPT | Performed by: EMERGENCY MEDICINE

## 2023-06-28 PROCEDURE — 84702 CHORIONIC GONADOTROPIN TEST: CPT | Performed by: EMERGENCY MEDICINE

## 2023-06-28 PROCEDURE — 87637 SARSCOV2&INF A&B&RSV AMP PRB: CPT | Performed by: EMERGENCY MEDICINE

## 2023-06-28 PROCEDURE — 84484 ASSAY OF TROPONIN QUANT: CPT | Performed by: EMERGENCY MEDICINE

## 2023-06-28 PROCEDURE — 80053 COMPREHEN METABOLIC PANEL: CPT | Performed by: EMERGENCY MEDICINE

## 2023-06-28 PROCEDURE — 84156 ASSAY OF PROTEIN URINE: CPT | Performed by: EMERGENCY MEDICINE

## 2023-06-28 PROCEDURE — 82310 ASSAY OF CALCIUM: CPT | Performed by: EMERGENCY MEDICINE

## 2023-06-28 PROCEDURE — 36415 COLL VENOUS BLD VENIPUNCTURE: CPT | Performed by: EMERGENCY MEDICINE

## 2023-06-28 PROCEDURE — 99285 EMERGENCY DEPT VISIT HI MDM: CPT | Mod: 25

## 2023-06-28 PROCEDURE — 93005 ELECTROCARDIOGRAM TRACING: CPT

## 2023-06-28 PROCEDURE — 96360 HYDRATION IV INFUSION INIT: CPT | Mod: 59

## 2023-06-28 PROCEDURE — 85025 COMPLETE CBC W/AUTO DIFF WBC: CPT | Performed by: EMERGENCY MEDICINE

## 2023-06-28 PROCEDURE — 83880 ASSAY OF NATRIURETIC PEPTIDE: CPT | Performed by: EMERGENCY MEDICINE

## 2023-06-28 PROCEDURE — 96361 HYDRATE IV INFUSION ADD-ON: CPT

## 2023-06-28 PROCEDURE — 85379 FIBRIN DEGRADATION QUANT: CPT | Performed by: EMERGENCY MEDICINE

## 2023-06-28 PROCEDURE — 81001 URINALYSIS AUTO W/SCOPE: CPT | Performed by: EMERGENCY MEDICINE

## 2023-06-28 PROCEDURE — 258N000003 HC RX IP 258 OP 636: Performed by: EMERGENCY MEDICINE

## 2023-06-28 RX ADMIN — SODIUM CHLORIDE 1000 ML: 9 INJECTION, SOLUTION INTRAVENOUS at 21:49

## 2023-06-28 ASSESSMENT — ACTIVITIES OF DAILY LIVING (ADL): ADLS_ACUITY_SCORE: 35

## 2023-06-29 ENCOUNTER — APPOINTMENT (OUTPATIENT)
Dept: CT IMAGING | Facility: CLINIC | Age: 23
End: 2023-06-29
Attending: EMERGENCY MEDICINE
Payer: COMMERCIAL

## 2023-06-29 ENCOUNTER — APPOINTMENT (OUTPATIENT)
Dept: ULTRASOUND IMAGING | Facility: CLINIC | Age: 23
End: 2023-06-29
Attending: EMERGENCY MEDICINE
Payer: COMMERCIAL

## 2023-06-29 VITALS
OXYGEN SATURATION: 100 % | HEART RATE: 112 BPM | DIASTOLIC BLOOD PRESSURE: 64 MMHG | SYSTOLIC BLOOD PRESSURE: 122 MMHG | TEMPERATURE: 97.5 F | RESPIRATION RATE: 18 BRPM

## 2023-06-29 LAB
ATRIAL RATE - MUSE: 114 BPM
DIASTOLIC BLOOD PRESSURE - MUSE: NORMAL MMHG
INTERPRETATION ECG - MUSE: NORMAL
P AXIS - MUSE: 62 DEGREES
PR INTERVAL - MUSE: 120 MS
QRS DURATION - MUSE: 82 MS
QT - MUSE: 308 MS
QTC - MUSE: 424 MS
R AXIS - MUSE: 49 DEGREES
SYSTOLIC BLOOD PRESSURE - MUSE: NORMAL MMHG
T AXIS - MUSE: -18 DEGREES
VENTRICULAR RATE- MUSE: 114 BPM

## 2023-06-29 PROCEDURE — 250N000011 HC RX IP 250 OP 636: Performed by: EMERGENCY MEDICINE

## 2023-06-29 PROCEDURE — 71275 CT ANGIOGRAPHY CHEST: CPT

## 2023-06-29 PROCEDURE — 96361 HYDRATE IV INFUSION ADD-ON: CPT

## 2023-06-29 PROCEDURE — 93970 EXTREMITY STUDY: CPT

## 2023-06-29 RX ORDER — IOPAMIDOL 755 MG/ML
500 INJECTION, SOLUTION INTRAVASCULAR ONCE
Status: COMPLETED | OUTPATIENT
Start: 2023-06-29 | End: 2023-06-29

## 2023-06-29 RX ADMIN — IOPAMIDOL 67 ML: 755 INJECTION, SOLUTION INTRAVENOUS at 01:19

## 2023-06-29 ASSESSMENT — ACTIVITIES OF DAILY LIVING (ADL): ADLS_ACUITY_SCORE: 35

## 2023-06-29 NOTE — ED NOTES
PIT/Triage Evaluation    Patient presented with shortness of breath, fatigue, body weakness, eyes aching bilaterally. Eye achiness started today at 11am but no longer presnent. No headache. Pt has some blurry vision.  No fever. No neck stiffness. No cough. No chest pain. No numbness in arms or legs. Both legs fell weak. NO h/o DVT/PE. NO h/o asthma. No vaginal bleeding, abdominal cramping. Pt is about 29wks gestation. Pt has had anemia in this pregnancy but no other reported problems. Has been feeling baby move.     Exam is notable for:    Patient Vitals for the past 24 hrs:   BP Temp Temp src Pulse Resp SpO2   06/28/23 2022 128/83 97.5  F (36.4  C) Temporal 109 18 100 %       General:  Alert, interactive  Cardiovascular:  Well perfused  Lungs:  No respiratory distress, no accessory muscle use  Neuro:  Moving all 4 extremities  Skin:  Warm, dry  Psych:  Normal affect      Appropriate interventions for symptom management were initiated if applicable.  Appropriate diagnostic tests were initiated if indicated.    Important information for subsequent clinician:    I briefly evaluated the patient and developed an initial plan of care. I discussed this plan and explained that this brief interaction does not constitute a full evaluation. Patient/family understands that they should wait to be fully evaluated and discuss any test results with another clinician prior to leaving the hospital.       Barrie Cardoso MD  06/28/23 2129       Barrie Cardoso MD  06/28/23 2131

## 2023-06-29 NOTE — ED TRIAGE NOTES
Here for multiple concerns of eye aches, palpitations, headache, generalized weakness, body aches, blurry vision, and leg pain. Stated sob for couple weeks, but feel it is worse today. about 7 months preg, denies any abdominal pain or vaginal bleeding. ABCs intact.      Triage Assessment     Row Name 06/28/23 2020       Triage Assessment (Adult)    Airway WDL WDL       Respiratory WDL    Respiratory WDL rhythm/pattern    Rhythm/Pattern, Respiratory shortness of breath       Cardiac WDL    Cardiac WDL WDL

## 2023-06-29 NOTE — ED PROVIDER NOTES
History     Chief Complaint:  Multiple Complaints       HPI   Nelsy Quinones is a 22 year old female who is a G2, P1 at about 29 weeks gestation and presents with primary complaint of shortness of breath.  She has been having some shortness of breath for the past couple of weeks which has been attributed to anemia.  However, this morning she felt more short of breath than usual and noticed that her heart rate was high.  She denies any significant chest pain or cough associated with this.  She has been having headaches since today as she felt like her eyes were aching.  She denies any abdominal pain, vomiting, diarrhea.  She continues to feel the baby move.  She denies any vaginal bleeding.  She feels like her vision is blurred at times.  She denies any focal weakness or numbness.  She feels like she is generally fatigued and weak and her legs feel heavy.      Independent Historian:    None, patient only    Review of External Notes:  Reviewed urgent care note from earlier today when patient presented there initially for shortness of breath.  She was referred to the emergency department for further care.  Reviewed OB/GYN office visit note from yesterday that reviewed her OB history and reiterated that many of her symptoms that she complaining of today have been ongoing.    Medications:    levETIRAcetam (KEPPRA) 500 MG tablet  Pediatric Multivitamins-Iron (CEROVITE JR) 18 MG chewable tablet  Prenatal Vit-Fe Fumarate-FA (PRENATAL MULTIVITAMIN W/IRON) 27-0.8 MG tablet        Past Medical History:    Past Medical History:   Diagnosis Date     Seizure disorder            Physical Exam     Patient Vitals for the past 24 hrs:   BP Temp Temp src Pulse Resp SpO2   06/29/23 0015 112/69 -- -- 106 -- 100 %   06/28/23 2022 128/83 97.5  F (36.4  C) Temporal 109 18 100 %        Physical Exam  Vitals and nursing note reviewed.   Constitutional:       General: She is not in acute distress.     Appearance: She is not  ill-appearing.   HENT:      Head: Normocephalic and atraumatic.      Right Ear: External ear normal.      Left Ear: External ear normal.      Nose: Nose normal.   Eyes:      Extraocular Movements: Extraocular movements intact.      Conjunctiva/sclera: Conjunctivae normal.      Pupils: Pupils are equal, round, and reactive to light.   Cardiovascular:      Rate and Rhythm: Regular rhythm. Tachycardia present.      Heart sounds: No murmur heard.  Pulmonary:      Effort: Pulmonary effort is normal. No respiratory distress.      Breath sounds: Normal breath sounds. No wheezing, rhonchi or rales.   Abdominal:      General: Abdomen is flat. Bowel sounds are normal. There is no distension.      Palpations: Abdomen is soft.      Tenderness: There is no abdominal tenderness. There is no guarding or rebound.      Comments: Gravid, nontender abdomen   Musculoskeletal:         General: No deformity or signs of injury.      Cervical back: Normal range of motion and neck supple.   Skin:     General: Skin is warm and dry.      Findings: No rash.   Neurological:      Mental Status: She is alert and oriented to person, place, and time.      Cranial Nerves: No cranial nerve deficit.      Sensory: No sensory deficit.      Motor: No weakness.   Psychiatric:         Behavior: Behavior normal.           Emergency Department Course   ECG  ECG results from 06/28/23   EKG 12-lead, tracing only     Value    Systolic Blood Pressure     Diastolic Blood Pressure     Ventricular Rate 114    Atrial Rate 114    IA Interval 120    QRS Duration 82        QTc 424    P Axis 62    R AXIS 49    T Axis -18    Interpretation ECG      Sinus tachycardia  Nonspecific T wave abnormality  Abnormal ECG  When compared with ECG of 09-OCT-2022 10:46,  Non-specific change in ST segment in Lateral leads  Nonspecific T wave abnormality, worse in Inferior leads  Nonspecific T wave abnormality, worse in Anterolateral leads           Imaging:  CT Chest Pulmonary  Embolism w Contrast   Final Result   IMPRESSION:    1.  No visualized pulmonary embolus.   2.  No evidence of active pulmonary disease.       US Lower Extremity Venous Duplex Bilateral   Final Result   IMPRESSION:      No deep venous thrombosis in the visualized bilateral lower extremities.        Report per radiology    Laboratory:  Labs Ordered and Resulted from Time of ED Arrival to Time of ED Departure   BASIC METABOLIC PANEL - Abnormal       Result Value    Sodium 135 (*)     Potassium 3.5      Chloride 104      Carbon Dioxide (CO2) 18 (*)     Anion Gap 13      Urea Nitrogen 8.7      Creatinine 0.56      Calcium 8.7      Glucose 168 (*)     GFR Estimate >90     HCG QUANTITATIVE PREGNANCY - Abnormal    hCG Quantitative 5,295 (*)    CBC WITH PLATELETS AND DIFFERENTIAL - Abnormal    WBC Count 11.3 (*)     RBC Count 3.56 (*)     Hemoglobin 9.3 (*)     Hematocrit 29.2 (*)     MCV 82      MCH 26.1 (*)     MCHC 31.8      RDW 14.6      Platelet Count 128 (*)     % Neutrophils 73      % Lymphocytes 18      % Monocytes 7      % Eosinophils 1      % Basophils 0      % Immature Granulocytes 1      NRBCs per 100 WBC 0      Absolute Neutrophils 8.3      Absolute Lymphocytes 2.0      Absolute Monocytes 0.8      Absolute Eosinophils 0.2      Absolute Basophils 0.0      Absolute Immature Granulocytes 0.1      Absolute NRBCs 0.0     D DIMER QUANTITATIVE - Abnormal    D-Dimer Quantitative 2.51 (*)    PROTEIN RANDOM URINE - Abnormal    Total Protein Urine mg/dL 22.2 (*)     Total Protein UR MG/MG CR 0.12      Creatinine Urine mg/dL 179.5     ROUTINE UA WITH MICROSCOPIC REFLEX TO CULTURE - Abnormal    Color Urine Yellow      Appearance Urine Clear      Glucose Urine >=1000 (*)     Bilirubin Urine Negative      Ketones Urine 10 (*)     Specific Gravity Urine 1.015      Blood Urine Negative      pH Urine 6.5      Protein Albumin Urine 30 (*)     Urobilinogen Urine Normal      Nitrite Urine Negative      Leukocyte Esterase Urine  Negative      Bacteria Urine Many (*)     Mucus Urine Present (*)     RBC Urine 4 (*)     WBC Urine 3      Squamous Epithelials Urine 17 (*)     Hyaline Casts Urine 1     TROPONIN T, HIGH SENSITIVITY - Normal    Troponin T, High Sensitivity <6     INFLUENZA A/B, RSV, & SARS-COV2 PCR - Normal    Influenza A PCR Negative      Influenza B PCR Negative      RSV PCR Negative      SARS CoV2 PCR Negative     NT PROBNP INPATIENT - Normal    N terminal Pro BNP Inpatient <36     HEPATIC FUNCTION PANEL - Normal    Protein Total 6.7      Albumin 3.6      Bilirubin Total <0.2      Alkaline Phosphatase 66      AST 23      ALT 11      Bilirubin Direct <0.20            Emergency Department Course & Assessments:             Interventions:  Medications   0.9% sodium chloride BOLUS (1,000 mLs Intravenous $New Bag 23)   sodium chloride (PF) 0.9% PF flush 100 mL (88 mLs Intravenous $Given 23 011)   iopamidol (ISOVUE-370) solution 500 mL (67 mLs Intravenous $Given 23)        Independent Interpretation (X-rays, CTs, rhythm strip):  None    Consultations/Discussion of Management or Tests:  None       Social Determinants of Health affecting care:  None     Disposition:  The patient was discharged to home.     Impression & Plan    CMS Diagnoses: None    Medical Decision Makin-year-old female presenting with shortness of breath and tachycardia today.  She has been having some shortness of breath ongoing with this pregnancy.  She does have anemia but it is not severe and unlikely to be causing her symptoms today.  She is at increased risk for PE given her pregnancy.  There is no obvious signs of DVT on her exam.  Could also be pneumonia, pneumothorax, ACS, myocarditis, etc.  She also is complaining of headache and some blurred vision intermittently.  She is normotensive and no signs of preeclampsia at this time.  She has normal neurologic exam.  Her headache was not thunderclap in onset so of low suspicion for  intracranial bleed or cerebral venous thrombosis.  Patient's initial work-up included labs that showed mild anemia.  Her D-dimer did come back significantly elevated.  She has mild thrombocytopenia which is not new either.  Otherwise her lab work-up was reassuring.  Her EKG shows nonspecific changes but no signs of ischemia or significant arrhythmia.  Given the elevated D-dimer, a venous Doppler was performed on both of her legs and did not show any signs of DVT.  Therefore, we proceeded to do a CT of the chest which fortunately did not show any signs of pulmonary embolism.  Is unclear what is causing patient's symptoms at this time, but it does not appear to be anything immediately life-threatening.  We will plan to discharge her home and recommend close follow-up with her OB/GYN and primary care physician.  I did discuss return precautions.      Diagnosis:    ICD-10-CM    1. Shortness of breath  R06.02       2. Malaise and fatigue  R53.81     R53.83       3. Encounter for supervision of other normal pregnancy in third trimester  Z34.83       4. Anemia, unspecified type  D64.9            Discharge Medications:  New Prescriptions    No medications on file          6/29/2023   José Sierra MD Goodwin, Shaun M, MD  06/29/23 0593

## 2023-07-19 ENCOUNTER — TELEPHONE (OUTPATIENT)
Dept: OBGYN | Facility: CLINIC | Age: 23
End: 2023-07-19

## 2023-07-19 NOTE — TELEPHONE ENCOUNTER
Pt is requesting to transfer care to [facility] from her previous clinic [facility]. Pt states she has been compliant with all prenatal appointments. Per [facility] protocol, all transfer prenatal patients that are equal to or greater than 24 weeks need prior approval from [facility] before scheduling any prenatal appointments.     Transferred records are in care everywhere.     2 Para 1 (vag delivery at term-BOY)    EDC 23    U/S done? Yes    Previous C-sec? n/a    List all major health problems: anemia, seizures    List all complications of past deliveries: ( Ask specifically about Gestational Diabetes, HTN and preeclampsia): anemia    List all current pregnancy issues: (Again, ask specifically about Gestational Diabetes, HTN and preeclampsia): anemia    List current medication list: folic acid, Keppra, iron    Per protocol, message routed to MD on-call for direction.     I did advise the pt to continue receiving her pn care from her current clinic until we get approval for transfer and appts scheduled.    Swetha SHOEMAKER RN

## 2023-07-25 ENCOUNTER — PRENATAL OFFICE VISIT (OUTPATIENT)
Dept: OBGYN | Facility: CLINIC | Age: 23
End: 2023-07-25
Payer: COMMERCIAL

## 2023-07-25 DIAGNOSIS — Z34.90 SUPERVISION OF NORMAL PREGNANCY: Primary | ICD-10-CM

## 2023-07-25 PROCEDURE — 99207 PR NO CHARGE NURSE ONLY: CPT

## 2023-07-25 NOTE — PATIENT INSTRUCTIONS
"Weeks 32 to 34 of Your Pregnancy: Care Instructions    Decide whether you want to bank or donate your baby's umbilical cord blood. If you want to save this blood, you have to arrange for it ahead of time.   Decide about circumcision. Personal, Jew, or cultural beliefs may play a role in your decision. You get to decide what you want for your baby.     Learn how to ease hemorrhoids.    Get more liquids, fruits, vegetables, and fiber in your diet.  Avoid sitting for too long.  Clean yourself with moist toilet paper. Or try witch hazel pads.  Try ice packs or warm sitz baths for discomfort.  Use hydrocortisone cream for pain or itching.  Ask your doctor about stool softeners.    Consider the benefits of breastfeeding.    It reduces your baby's risk of sudden infant death syndrome (SIDS).   babies are less likely to get certain infections. And they're less likely to be obese or get diabetes later in life.  It can lower your risk of breast and ovarian cancers and osteoporosis.  It saves you money.  Follow-up care is a key part of your treatment and safety. Be sure to make and go to all appointments, and call your doctor if you are having problems. It's also a good idea to know your test results and keep a list of the medicines you take.  Where can you learn more?  Go to [email or web address]  Enter X711 in the search box to learn more about \"Weeks 32 to 34 of Your Pregnancy: Care Instructions.\"  Current as of: 2022               Content Version: 13.7    5784-3050 [employer].   Care instructions adapted under license by your healthcare professional. If you have questions about a medical condition or this instruction, always ask your healthcare professional. [employer] disclaims any warranty or liability for your use of this information.        Understanding  Labor  Going into labor before week 37 of pregnancy is called  labor.  " labor can cause your baby to be born too soon. This can lead to health problems for your baby.     Before labor, the cervix is thick and closed.      In  labor, the cervix begins to efface (thin) and dilate (open).     Symptoms of  labor  If you think you re having  labor, get medical help right away. Contractions alone don t mean you re in  labor. What matters more are changes in your cervix. The cervix is the opening at the lower end of the uterus. Symptoms of  labor include:  4 or more contractions per hour  Strong contractions  Constant menstrual-like cramping  Low-back pain  Mucous or bloody fluid from the vagina  Bleeding or spotting in the second or third trimester  Evaluating  labor  Your healthcare provider will try to find out if you re in  labor or just having contractions. They may watch you for a few hours. You may have these tests:  Pelvic exam. This is to see if your cervix has effaced (thinned) and dilated (opened).  Uterine activity monitoring. This is used to detect contractions.  Fetal monitoring. This is done to check the health of your baby.  Ultrasound. This test looks at your baby s size and position.  Amniocentesis. This test checks how mature your baby s lungs are.  Caring for yourself at home  If you have  contractions, but your cervix is still thick and closed, your healthcare provider may tell you to:  Drink plenty of water.  Do fewer activities.  Rest in bed on your side.  Don't have intercourse or stimulate your nipples.  When to call your healthcare provider  Call your healthcare provider if you have any of these:  4 or more contractions per hour  Bag of water breaks  Bleeding or spotting  If you need hospital care   labor often means that you need hospital care. You may need complete bed rest. You may have an IV (intravenous) line in your arm or hand. This is to give you fluids. You may be given pills or injections. These  are done to help prevent contractions. You may get a medicine called a corticosteroid. This is to help your baby s lungs mature more quickly.  Are you at risk?  Any pregnant woman can have  labor. It may start for no reason. But these risk factors can increase your chances:  Past  labor or early birth  Smoking, drug, or alcohol use in pregnancy  A multiple pregnancy (twins or more)  Problems with the shape of the uterus  Bleeding during the pregnancy  The dangers of  birth  A baby born too soon may have health problems. This is because the baby didn t have enough time to grow. Some of the risks for your baby include:  Not breastfeeding or feeding well  Having immature lungs  Bleeding in the brain  Death  Reaching term  Your goal is to get as close to term (week 37 or later) as you can before giving birth. The closer you get to term, the higher your chance of having a healthy baby. Work with your healthcare provider. Together, you can take steps that may keep you from giving birth too early.  [employer] last reviewed this educational content on 10/1/2021    6580-8353 [employer]. All rights reserved. This information is not intended as a substitute for professional medical care. Always follow your healthcare professional's instructions.          Adapting to Pregnancy: Third Trimester  Although common during pregnancy, some discomforts may seem worse in the final weeks. Simple lifestyle changes can help. Take care of yourself. And ask your partner to help out with small tasks.   Limiting leg problems  Ways to combat leg issues:  Wear support hose all day.  Don't wear snug shoes or clothes that bind, such as tight pants and socks with elastic tops.  Sit with your feet and legs raised often.  Caring for your breasts  Tips to follow include:  Wash with plain water. Don't use harsh soaps or rubbing alcohol. They may cause dryness.  Wear a nursing bra for extra support. It can also hide any  leaks from your nipples.  Controlling hemorrhoids  Ways to prevent hemorrhoids include:   Eat foods that are high in fiber. Also exercise and drink enough fluids. This will reduce constipation and hemorrhoids.  Sleep and nap on your side. This limits pressure on the veins of your rectum.  Try not to stand or sit for long periods.  Controlling back pain  As your body changes during pregnancy, your back must work in new ways. Back pain has many causes. Physical changes in your body can strain your back and its supporting muscles. Also hormones increase during pregnancy. This can affect how your muscles and joints work together. All of these changes can lead to pain.   Pain may be felt in the upper or lower back. Pain is also common in the pelvis. Some pregnant women have sciatica. This is pain caused by pressure on the sciatic nerve running down the back of the leg. Ice or heat may help. Your provider may advise massage therapy or a chiropractor. Sleep on your left side with a pillow between your knees. Use a brace or support device. Ask your provider for specific tips and exercises to help control your back pain.   Tips to help you rest  Good rest and sleep will help you feel better. Here are some ideas:   Ask your partner to massage your shoulders, neck, or back.  Limit the errands you do each day.  Lie down in the afternoon or after work for a few minutes.  Take a warm bath before you go to sleep.  Drink warm milk or teas without caffeine.  Don't drink coffee, black tea, and cola.  Stopping heartburn  Don't eat spicy, greasy, fried, or acidic foods.  Eat small amounts more often. Eat slowly. Wait 2 hours after eating before lying down.  Sleep with your upper body raised 6 inches.   Managing mood swings  Ways to manage mood swings include:   Know that mood changes are normal.  Exercise often, but get plenty of rest.  Address any concerns and limit stress. Talking to your partner, other women, or your healthcare  provider may help.   Dealing with urinary frequency  Tips to deal with having to urinate often include:   Drink plenty of water all day. But if you drink a lot in the evening, you may have to get up more in the night.  Limit coffee, black tea, and cola.  How daily issues affect your health  Many things in your daily life impact your health. This can include transportation, money problems, housing, access to food, and . If you can t get to medical appointments, you may not receive the care you need. When money is tight, it may be difficult to pay for medicines. And living far from a grocery store can make it hard to buy healthy food.   If you have concerns in any of these or other areas, talk with your healthcare team. They may know of local resources to assist you. Or they may have a staff person who can help.   Andrea last reviewed this educational content on 7/1/2021 2000-2023 [employer]. All rights reserved. This information is not intended as a substitute for professional medical care. Always follow your healthcare professional's instructions.

## 2023-07-25 NOTE — PROGRESS NOTES
NPN nurse visit done over the phone. Questions answered. Pt advised to call the clinic if she has any questions or concerns related to her pregnancy.  New prenatal visit scheduled on 8/17/23 with Dr. Swain.  Patient knows to keep appointments scheduled with primary clinic until that time.    33w0d    Last pap: Unsure        Patient supplied answers from flow sheet for:  Prenatal OB Questionnaire.  Past Medical History  Have you ever recieved care for your mental health? : (!) Yes  Have you ever been in a major accident or suffered serious trauma?: No  Within the last year, has anyone hit, slapped, kicked or otherwise hurt you?: No  In the last year, has anyone forced you to have sex when you didn't want to?: No    Past Medical History 2   Have you ever received a blood transfusion?: No  Would you accept a blood transfusion if was medically recommended?: Yes  Does anyone in your home smoke?: No   Is your blood type Rh negative?: Unknown  Have you ever ?: (!) Yes  Have you been hospitalized for a nonsurgical reason excluding normal delivery?: (!) Yes  Have you ever had an abnormal pap smear?: (!) Yes    Past Medical History (Continued)  Do you have a history of abnormalities of the uterus?: No  Did your mother take RAMBO or any other hormones when she was pregnant with you?: Unknown  Do you have any other problems we have not asked about which you feel may be important to this pregnancy?: No

## 2023-08-10 ENCOUNTER — HOSPITAL ENCOUNTER (OUTPATIENT)
Facility: CLINIC | Age: 23
End: 2023-08-10
Admitting: OBSTETRICS & GYNECOLOGY
Payer: COMMERCIAL

## 2023-08-10 ENCOUNTER — HOSPITAL ENCOUNTER (OUTPATIENT)
Facility: CLINIC | Age: 23
Discharge: HOME | End: 2023-08-10
Attending: OBSTETRICS & GYNECOLOGY | Admitting: OBSTETRICS & GYNECOLOGY
Payer: COMMERCIAL

## 2023-08-10 VITALS
TEMPERATURE: 98.8 F | RESPIRATION RATE: 20 BRPM | DIASTOLIC BLOOD PRESSURE: 72 MMHG | SYSTOLIC BLOOD PRESSURE: 121 MMHG | OXYGEN SATURATION: 99 %

## 2023-08-10 LAB
ALBUMIN MFR UR ELPH: 12.3 MG/DL
ALBUMIN SERPL BCG-MCNC: 3.4 G/DL (ref 3.5–5.2)
ALBUMIN UR-MCNC: 10 MG/DL
ALP SERPL-CCNC: 106 U/L (ref 35–104)
ALT SERPL W P-5'-P-CCNC: 12 U/L (ref 0–50)
ANION GAP SERPL CALCULATED.3IONS-SCNC: 12 MMOL/L (ref 7–15)
APPEARANCE UR: CLEAR
AST SERPL W P-5'-P-CCNC: 23 U/L (ref 0–45)
BACTERIA #/AREA URNS HPF: ABNORMAL /HPF
BILIRUB SERPL-MCNC: <0.2 MG/DL
BILIRUB UR QL STRIP: NEGATIVE
BUN SERPL-MCNC: 5.6 MG/DL (ref 6–20)
CALCIUM SERPL-MCNC: 9 MG/DL (ref 8.6–10)
CHLORIDE SERPL-SCNC: 103 MMOL/L (ref 98–107)
COLOR UR AUTO: ABNORMAL
CREAT SERPL-MCNC: 0.53 MG/DL (ref 0.51–0.95)
CREAT UR-MCNC: 75.6 MG/DL
DEPRECATED HCO3 PLAS-SCNC: 20 MMOL/L (ref 22–29)
ERYTHROCYTE [DISTWIDTH] IN BLOOD BY AUTOMATED COUNT: 15.7 % (ref 10–15)
GFR SERPL CREATININE-BSD FRML MDRD: >90 ML/MIN/1.73M2
GLUCOSE SERPL-MCNC: 133 MG/DL (ref 70–99)
GLUCOSE UR STRIP-MCNC: 100 MG/DL
HCT VFR BLD AUTO: 27.3 % (ref 35–47)
HGB BLD-MCNC: 8.3 G/DL (ref 11.7–15.7)
HGB UR QL STRIP: NEGATIVE
KETONES UR STRIP-MCNC: 150 MG/DL
LEUKOCYTE ESTERASE UR QL STRIP: NEGATIVE
MCH RBC QN AUTO: 23.9 PG (ref 26.5–33)
MCHC RBC AUTO-ENTMCNC: 30.4 G/DL (ref 31.5–36.5)
MCV RBC AUTO: 79 FL (ref 78–100)
MUCOUS THREADS #/AREA URNS LPF: PRESENT /LPF
NITRATE UR QL: NEGATIVE
PH UR STRIP: 6.5 [PH] (ref 5–7)
PLATELET # BLD AUTO: 148 10E3/UL (ref 150–450)
POTASSIUM SERPL-SCNC: 3.6 MMOL/L (ref 3.4–5.3)
PROT SERPL-MCNC: 6.3 G/DL (ref 6.4–8.3)
PROT/CREAT 24H UR: 0.16 MG/MG CR (ref 0–0.2)
RBC # BLD AUTO: 3.47 10E6/UL (ref 3.8–5.2)
RBC URINE: 0 /HPF
SODIUM SERPL-SCNC: 135 MMOL/L (ref 136–145)
SP GR UR STRIP: 1.01 (ref 1–1.03)
SQUAMOUS EPITHELIAL: 6 /HPF
UROBILINOGEN UR STRIP-MCNC: NORMAL MG/DL
WBC # BLD AUTO: 9.7 10E3/UL (ref 4–11)
WBC URINE: <1 /HPF

## 2023-08-10 PROCEDURE — 84156 ASSAY OF PROTEIN URINE: CPT | Performed by: OBSTETRICS & GYNECOLOGY

## 2023-08-10 PROCEDURE — G0463 HOSPITAL OUTPT CLINIC VISIT: HCPCS

## 2023-08-10 PROCEDURE — 85027 COMPLETE CBC AUTOMATED: CPT | Performed by: OBSTETRICS & GYNECOLOGY

## 2023-08-10 PROCEDURE — 36415 COLL VENOUS BLD VENIPUNCTURE: CPT | Performed by: OBSTETRICS & GYNECOLOGY

## 2023-08-10 PROCEDURE — 81001 URINALYSIS AUTO W/SCOPE: CPT | Performed by: OBSTETRICS & GYNECOLOGY

## 2023-08-10 PROCEDURE — 80053 COMPREHEN METABOLIC PANEL: CPT | Performed by: OBSTETRICS & GYNECOLOGY

## 2023-08-10 RX ORDER — FERROUS GLUCONATE 324(37.5)
324 TABLET ORAL
COMMUNITY
End: 2023-08-22

## 2023-08-10 RX ORDER — FAMOTIDINE 20 MG/1
20 TABLET, FILM COATED ORAL 2 TIMES DAILY
Status: ON HOLD | COMMUNITY
End: 2023-09-22

## 2023-08-10 RX ORDER — FOLIC ACID 1 MG/1
1 TABLET ORAL DAILY
Status: ON HOLD | COMMUNITY
End: 2023-09-22

## 2023-08-10 ASSESSMENT — ACTIVITIES OF DAILY LIVING (ADL)
TOILETING_ISSUES: NO
FALL_HISTORY_WITHIN_LAST_SIX_MONTHS: NO
CONCENTRATING,_REMEMBERING_OR_MAKING_DECISIONS_DIFFICULTY: NO
WEAR_GLASSES_OR_BLIND: NO
ADLS_ACUITY_SCORE: 35
ADLS_ACUITY_SCORE: 18
DIFFICULTY_EATING/SWALLOWING: NO
DOING_ERRANDS_INDEPENDENTLY_DIFFICULTY: NO
CHANGE_IN_FUNCTIONAL_STATUS_SINCE_ONSET_OF_CURRENT_ILLNESS/INJURY: NO
DRESSING/BATHING_DIFFICULTY: NO
WALKING_OR_CLIMBING_STAIRS_DIFFICULTY: NO

## 2023-08-11 NOTE — PLAN OF CARE
"Data: [facility] presented to [facility]: 8/10/2023  6:44 PM.  Reason for maternal/fetal assessment is headache, rule out pre-eclampsia. Patient reports intermittent headache since Tuesday, states she \"feels hot in her eyes, generalized weakness, and hot flashes\". Patient also reports cramping. Patient has not taken tylenol or any other medications for pain. Patient is a .  Prenatal record reviewed. Pregnancy  has been complicated by history of seizure disorder and anxiety/depression/self harm, currently stable, and anemia.  Gestational Age 35w2d. VSS. Fetal movement active. Patient denies uterine contractions, leaking of vaginal fluid/rupture of membranes, vaginal bleeding, abdominal pain, pelvic pressure, nausea, vomiting, visual disturbances, epigastric or URQ pain, significant edema. Support person is present.   Action: Verbal consent for EFM. Triage assessment completed. Bill of rights reviewed.  Response: Patient verbalized agreement with plan. Will contact Dr Pam Kelley with update and for further orders.    "

## 2023-08-11 NOTE — PLAN OF CARE
Data: Patient assessed in the [facility] for intermittent headache for multiple days.  Cervical exam not examined.  Membranes intact. Uterus palpates soft, monika 1-2x q30 min.  Action:  Presumed adequate fetal oxygenation documented (see flow record). Discharge instructions reviewed.  Patient instructed to drink at least 8-12 glasses of water per day to maintain adequate hydration. Patient instructed to report change in fetal movement, vaginal leaking of fluid or bleeding, abdominal pain, or any concerns related to the pregnancy to her nurse/physician.    Response: Orders to discharge home per Dr. Kelley.  Patient verbalized understanding of education and verbalized agreement with plan. Discharged to home at 2135.

## 2023-08-11 NOTE — DISCHARGE INSTRUCTIONS
Discharge Instruction for Undelivered Patients      You were seen for:  Intermittent Headache  We Consulted: Dr. Kelley  You had (Test or Medicine):External fetal monitoring, bloodwork, urinalysis     Diet:   Drink 8 to 12 glasses of liquids (milk, juice, water) every day.  You may eat meals and snacks.     Activity:  Count fetal kicks everyday (see handout)  Call your doctor or nurse midwife if your baby is moving less than usual.     Call your provider if you notice:  Swelling in your face or increased swelling in your hands or legs.  Headaches that are not relieved by Tylenol (acetaminophen).  Changes in your vision (blurring: seeing spots or stars.)  Nausea (sick to your stomach) and vomiting (throwing up).   Weight gain of 5 pounds or more per week.  Heartburn that doesn't go away.  Signs of bladder infection: pain when you urinate (use the toilet), need to go more often and more urgently.  The bag of dejesus (rupture of membranes) breaks, or you notice leaking in your underwear.  Bright red blood in your underwear.  Abdominal (lower belly) or stomach pain.  Second (plus) baby: Contractions (tightening) less than 10 minutes apart and getting stronger.  Increase or change in vaginal discharge (note the color and amount)      Follow-up:  As scheduled in the clinic

## 2023-08-11 NOTE — PROVIDER NOTIFICATION
08/10/23 2126   Provider Notification   Provider Name/Title Dr. Kelley   Method of Notification Phone     MD updated that patients labs have resulted. Pre-E Labs WNL. Patient resting comfortably in bed, ate dinner and drank 1L of water.    TORB to discharge patient home.

## 2023-08-11 NOTE — PROVIDER NOTIFICATION
"   08/10/23 1955   Provider Notification   Provider Name/Title Dr. Kelley   Method of Notification Phone     Dr Pam Kelley informed of patient arrival and assessment including the following:    Reason for maternal/fetal assessment is persistent headache since Tuesday. Pt reports that she \"feels hot behind her eyes, generalized weakness, and hot flashes\". Fetal status normal baseline, moderate variability, accelerations present and no decelerations. Plan per provider/orders received for pre-E labs and urine PCR, PO hydration. RN will update MD with findings.  "

## 2023-08-22 ENCOUNTER — PRENATAL OFFICE VISIT (OUTPATIENT)
Dept: OBGYN | Facility: CLINIC | Age: 23
End: 2023-08-22
Payer: COMMERCIAL

## 2023-08-22 VITALS — DIASTOLIC BLOOD PRESSURE: 72 MMHG | SYSTOLIC BLOOD PRESSURE: 136 MMHG | WEIGHT: 188 LBS | BODY MASS INDEX: 32.27 KG/M2

## 2023-08-22 DIAGNOSIS — Z34.83 ENCOUNTER FOR SUPERVISION OF OTHER NORMAL PREGNANCY IN THIRD TRIMESTER: Primary | ICD-10-CM

## 2023-08-22 LAB
ERYTHROCYTE [DISTWIDTH] IN BLOOD BY AUTOMATED COUNT: 16.8 % (ref 10–15)
HCT VFR BLD AUTO: 26.7 % (ref 35–47)
HGB BLD-MCNC: 8.6 G/DL (ref 11.7–15.7)
MCH RBC QN AUTO: 24.3 PG (ref 26.5–33)
MCHC RBC AUTO-ENTMCNC: 32.2 G/DL (ref 31.5–36.5)
MCV RBC AUTO: 75 FL (ref 78–100)
PLATELET # BLD AUTO: 160 10E3/UL (ref 150–450)
RBC # BLD AUTO: 3.54 10E6/UL (ref 3.8–5.2)
WBC # BLD AUTO: 8.4 10E3/UL (ref 4–11)

## 2023-08-22 PROCEDURE — 87653 STREP B DNA AMP PROBE: CPT | Performed by: OBSTETRICS & GYNECOLOGY

## 2023-08-22 PROCEDURE — 36415 COLL VENOUS BLD VENIPUNCTURE: CPT | Performed by: OBSTETRICS & GYNECOLOGY

## 2023-08-22 PROCEDURE — 85027 COMPLETE CBC AUTOMATED: CPT | Performed by: OBSTETRICS & GYNECOLOGY

## 2023-08-22 PROCEDURE — 80177 DRUG SCRN QUAN LEVETIRACETAM: CPT | Performed by: OBSTETRICS & GYNECOLOGY

## 2023-08-22 PROCEDURE — 99207 PR PRENATAL VISIT: CPT | Performed by: OBSTETRICS & GYNECOLOGY

## 2023-08-22 RX ORDER — FERROUS GLUCONATE 324(38)MG
324 TABLET ORAL
Status: ON HOLD | COMMUNITY
Start: 2023-08-09 | End: 2023-09-22

## 2023-08-22 NOTE — PROGRESS NOTES
PATEL from [facility].    22yo  at 37w0d.  Care to date has been uneventful except for anemia.  Has had normal Hgb electrophoresis in past so presumed Fe deficiency.  Seizure disorder on Keppra.  Diagnosed in 2019 and no seizure since.    Baby active.  No regular ctx.  Practice coverage and L&D discussed.  GBS done.    Seen on L&D for HA on 8/10 and ruled out for pre eclampsia    Check CBC and Keppra levels today.  RTC 1 week

## 2023-08-23 LAB — LEVETIRACETAM SERPL-MCNC: <2 ΜG/ML (ref 10–40)

## 2023-08-24 LAB — GP B STREP DNA SPEC QL NAA+PROBE: NEGATIVE

## 2023-08-26 ENCOUNTER — HOSPITAL ENCOUNTER (OUTPATIENT)
Facility: CLINIC | Age: 23
Discharge: HOME | End: 2023-08-26
Attending: OBSTETRICS & GYNECOLOGY | Admitting: OBSTETRICS & GYNECOLOGY
Payer: COMMERCIAL

## 2023-08-26 ENCOUNTER — HOSPITAL ENCOUNTER (OUTPATIENT)
Facility: CLINIC | Age: 23
End: 2023-08-26
Admitting: OBSTETRICS & GYNECOLOGY
Payer: COMMERCIAL

## 2023-08-26 VITALS — RESPIRATION RATE: 18 BRPM | SYSTOLIC BLOOD PRESSURE: 126 MMHG | DIASTOLIC BLOOD PRESSURE: 74 MMHG | TEMPERATURE: 98.4 F

## 2023-08-26 PROCEDURE — G0463 HOSPITAL OUTPT CLINIC VISIT: HCPCS

## 2023-08-26 ASSESSMENT — ACTIVITIES OF DAILY LIVING (ADL): ADLS_ACUITY_SCORE: 31

## 2023-08-27 NOTE — PROVIDER NOTIFICATION
"   23   Provider Notification   Provider Name/Title Dr. Butler   Method of Notification Phone   Request Evaluate - Remote   Notification Reason Patient Arrived     MD notified of patient arrival.  at 37&4 weeks, here for elevated home BP and swelling. Pregnancy complicated by anemia and seizure disorder. Patient reports feeling \"tightness\" in her hands and feet today, having a mild headache off and on throughout the day, and having a BP at home in the 130's. Denies LOF, vaginal bleeding, contractions & reports fetal movement. BP's 124/73 and 125/74, no significant swelling noted, and patient does not have a HA at this time. FHR moderate with accels and no decels & no contractions noted on toco. Orders received to discharge patient home and to have her follow up in clinic with her scheduled appt.   "

## 2023-08-27 NOTE — PLAN OF CARE
Data: Patient assessed in the [facility] for hypertension disorder of pregnancy.  Cervical exam not examined.  Membranes intact.  Contractions none.  Action:  Presumed adequate fetal oxygenation documented (see flow record). Discharge instructions reviewed.  Patient instructed to report change in fetal movement, vaginal leaking of fluid or bleeding, abdominal pain, or any concerns related to the pregnancy to her nurse/physician.    Response: Orders to discharge home per .  Patient verbalized understanding of education and verbalized agreement with plan. Discharged to home at 2130.

## 2023-08-27 NOTE — DISCHARGE INSTRUCTIONS
Discharge Instruction for Undelivered Patients      You were seen for:  BP eval  We Consulted: Dr. Butler  You had (Test or Medicine):BP monitoring     Diet:   Drink 8 to 12 glasses of liquids (milk, juice, water) every day.  You may eat meals and snacks.     Activity:  Call your doctor or nurse midwife if your baby is moving less than usual.     Call your provider if you notice:  Swelling in your face or increased swelling in your hands or legs.  Headaches that are not relieved by Tylenol (acetaminophen).  Changes in your vision (blurring: seeing spots or stars.)  Nausea (sick to your stomach) and vomiting (throwing up).   Weight gain of 5 pounds or more per week.  Heartburn that doesn't go away.  Signs of bladder infection: pain when you urinate (use the toilet), need to go more often and more urgently.  The bag of dejesus (rupture of membranes) breaks, or you notice leaking in your underwear.  Bright red blood in your underwear.  Abdominal (lower belly) or stomach pain.  For first baby: Contractions (tightening) less than 5 minutes apart for one hour or more.  Second (plus) baby: Contractions (tightening) less than 10 minutes apart and getting stronger.  *If less than 34 weeks: Contractions (tightening) more than 6 times in one hour.  Increase or change in vaginal discharge (note the color and amount)      Follow-up:  As scheduled in the clinic

## 2023-08-27 NOTE — PLAN OF CARE
"Data: Patient presented to [facility]: 2023  8:36 PM.  Reason for maternal/fetal assessment is elevated blood pressures. Patient reports feeling \"tightness\" in her hands and feet today, as well as a BP on her home cuff in the 130's and a headache on and off today.  Patient is a .  Prenatal record reviewed. Pregnancy complicated by anemia and seizure disorder.  Gestational Age 37w4d. VSS. Fetal movement active. Patient denies uterine contractions, leaking of vaginal fluid/rupture of membranes, vaginal bleeding, abdominal pain, pelvic pressure, nausea, vomiting, visual disturbances, epigastric or URQ pain, significant edema. Support person is present.   Action: Verbal consent for EFM. Triage assessment completed. Bill of rights reviewed.  Response: Patient verbalized agreement with plan. Will contact Dr Jessica Butler with update and for further orders.    "

## 2023-08-29 ENCOUNTER — PRENATAL OFFICE VISIT (OUTPATIENT)
Dept: OBGYN | Facility: CLINIC | Age: 23
End: 2023-08-29
Payer: COMMERCIAL

## 2023-08-29 VITALS — DIASTOLIC BLOOD PRESSURE: 80 MMHG | BODY MASS INDEX: 32.32 KG/M2 | WEIGHT: 188.3 LBS | SYSTOLIC BLOOD PRESSURE: 118 MMHG

## 2023-08-29 DIAGNOSIS — Z34.83 ENCOUNTER FOR SUPERVISION OF OTHER NORMAL PREGNANCY IN THIRD TRIMESTER: Primary | ICD-10-CM

## 2023-08-29 PROCEDURE — 99207 PR PRENATAL VISIT: CPT | Performed by: FAMILY MEDICINE

## 2023-08-29 NOTE — PROGRESS NOTES
CC: Here for routine prenatal visit   23 year old y/o  @ 38w0d with Estimated Date of Delivery: Sep 12, 2023     /80   Wt 85.4 kg (188 lb 4.8 oz)   LMP 2022   BMI 32.32 kg/m    See OB flowsheet  + fetal movement, no contractions, no bleeding, no loss of fluid   Discussed monitoring fetal movement     1) concerns: none  2) Routine: Blood type A+ RI tdap [ ] flu [ ] GS [ ] NIPT [declined] AFP [dec]   Covid v [dec] gtt [normal 3 hour test]  GBS neg  3) Risk factors:   A: Anemia: normal hbg electrophoresis, taking iron, last hgb 8.6 on 2023  B: History of seizure: Keppra level low on 2023, supposed to contact them    To see if the dose should be increased.  She thinks when she was checked she   Had missed her morning dose.  But now is taking. Has not had a seizure for a few years.     4) Return: weekly     Rajesh

## 2023-08-29 NOTE — NURSING NOTE
"38w0d    Chief Complaint   Patient presents with    Prenatal Care       Initial /80   Wt 85.4 kg (188 lb 4.8 oz)   LMP 2022   BMI 32.32 kg/m   Estimated body mass index is 32.32 kg/m  as calculated from the following:    Height as of 23: 1.626 m (5' 4\").    Weight as of this encounter: 85.4 kg (188 lb 4.8 oz).  BP completed using cuff size: regular    Questioned patient about current smoking habits.  Pt. has never smoked.          The following HM Due: NONE         "

## 2023-08-29 NOTE — PATIENT INSTRUCTIONS
"Return weekly   Return to clinic:  every 4 weeks till 28 weeks, then every 2 weeks till 36 weeks, then weekly till delivery      Phone numbers [place]:  Day/ night [phone] ask for ob triage  Emergency:  Call labor and delivery:  [phone]    What should I call about??    Contraction every 5 minutes for 1 hour 1 minute long (511), bleeding, loss of fluid, headache that doesn't resolve with tylenol, and decreased fetal movement     Start kick counts @ 26-28 weeks   There is an surinder for this!  It is called \"count the kicks\"  Keep track of movement and discover your normal baby movement pattern   guideline is listed below  Please call if you do not feel the baby move!  We will have you come in for fetal heart rate monitoring:   Perception of at least 10 FMs during 12 hours of normal maternal activity   Perception of least 10 FMs over two hours when the mother is at rest and focused on [facility] Address   [address], [place], [place] [address] [phone]    Dr. Calli Mena, DO    OB/GYN   [facility]                                                    "

## 2023-09-05 ENCOUNTER — PRENATAL OFFICE VISIT (OUTPATIENT)
Dept: OBGYN | Facility: CLINIC | Age: 23
End: 2023-09-05
Payer: COMMERCIAL

## 2023-09-05 VITALS — SYSTOLIC BLOOD PRESSURE: 116 MMHG | WEIGHT: 191 LBS | DIASTOLIC BLOOD PRESSURE: 70 MMHG | BODY MASS INDEX: 32.79 KG/M2

## 2023-09-05 DIAGNOSIS — Z34.83 ENCOUNTER FOR SUPERVISION OF OTHER NORMAL PREGNANCY IN THIRD TRIMESTER: Primary | ICD-10-CM

## 2023-09-05 DIAGNOSIS — G40.909 SEIZURE DISORDER (H): ICD-10-CM

## 2023-09-05 PROCEDURE — 99207 PR PRENATAL VISIT: CPT | Performed by: OBSTETRICS & GYNECOLOGY

## 2023-09-05 NOTE — NURSING NOTE
"Chief Complaint   Patient presents with    Prenatal Care   39w0d    initial /70   Wt 86.6 kg (191 lb)   LMP 09/20/2022   BMI 32.79 kg/m   Estimated body mass index is 32.79 kg/m  as calculated from the following:    Height as of 4/6/23: 1.626 m (5' 4\").    Weight as of this encounter: 86.6 kg (191 lb).  BP completed using cuff size regular.  Vivi Tovar CMA    "

## 2023-09-05 NOTE — PROGRESS NOTES
22yo  at 39w0d.  Seizure disorder on Keppra - now taking as advised.  Hgb 8.6.  Chronic Fe deficiency.  On Oral Fe.  Hx  at [facility].  L&D discussed.  RTC 1 week

## 2023-09-10 ENCOUNTER — HEALTH MAINTENANCE LETTER (OUTPATIENT)
Age: 23
End: 2023-09-10

## 2023-09-12 ENCOUNTER — PRENATAL OFFICE VISIT (OUTPATIENT)
Dept: OBGYN | Facility: CLINIC | Age: 23
End: 2023-09-12
Payer: COMMERCIAL

## 2023-09-12 VITALS
DIASTOLIC BLOOD PRESSURE: 78 MMHG | SYSTOLIC BLOOD PRESSURE: 122 MMHG | BODY MASS INDEX: 32.96 KG/M2 | WEIGHT: 192 LBS | HEART RATE: 100 BPM

## 2023-09-12 DIAGNOSIS — G40.909 SEIZURE DISORDER (H): ICD-10-CM

## 2023-09-12 DIAGNOSIS — Z34.83 ENCOUNTER FOR SUPERVISION OF OTHER NORMAL PREGNANCY IN THIRD TRIMESTER: Primary | ICD-10-CM

## 2023-09-12 PROCEDURE — 99207 PR PRENATAL VISIT: CPT | Performed by: OBSTETRICS & GYNECOLOGY

## 2023-09-12 NOTE — PROGRESS NOTES
22yo  at 40wks.  Desires spontaneous labor rather than induction.  Discussed recommendation for induction at 41 weeks.  Will RTC 1 week if undelivered.  Membranes swept today.

## 2023-09-19 ENCOUNTER — PRENATAL OFFICE VISIT (OUTPATIENT)
Dept: OBGYN | Facility: CLINIC | Age: 23
End: 2023-09-19
Payer: COMMERCIAL

## 2023-09-19 VITALS — SYSTOLIC BLOOD PRESSURE: 120 MMHG | DIASTOLIC BLOOD PRESSURE: 78 MMHG | WEIGHT: 197 LBS | BODY MASS INDEX: 33.81 KG/M2

## 2023-09-19 DIAGNOSIS — G40.909 SEIZURE DISORDER (H): ICD-10-CM

## 2023-09-19 DIAGNOSIS — Z34.83 ENCOUNTER FOR SUPERVISION OF OTHER NORMAL PREGNANCY IN THIRD TRIMESTER: Primary | ICD-10-CM

## 2023-09-19 DIAGNOSIS — D50.8 OTHER IRON DEFICIENCY ANEMIA: ICD-10-CM

## 2023-09-19 PROCEDURE — 99207 PR PRENATAL VISIT: CPT | Performed by: OBSTETRICS & GYNECOLOGY

## 2023-09-19 PROCEDURE — 59425 ANTEPARTUM CARE ONLY: CPT | Performed by: OBSTETRICS & GYNECOLOGY

## 2023-09-19 NOTE — PROGRESS NOTES
22yo  at 41wks.  Discussed postdates induction options.  Prefers amniotomy alone if effective.  Induction scheduled for tomorrow AM

## 2023-09-20 ENCOUNTER — HOSPITAL ENCOUNTER (INPATIENT)
Facility: CLINIC | Age: 23
LOS: 2 days | Discharge: HOME | End: 2023-09-22
Attending: OBSTETRICS & GYNECOLOGY | Admitting: OBSTETRICS & GYNECOLOGY
Payer: COMMERCIAL

## 2023-09-20 DIAGNOSIS — D50.8 IRON DEFICIENCY ANEMIA SECONDARY TO INADEQUATE DIETARY IRON INTAKE: ICD-10-CM

## 2023-09-20 LAB
ABO/RH(D): NORMAL
ANTIBODY SCREEN: NEGATIVE
HGB BLD-MCNC: 9 G/DL (ref 11.7–15.7)
SPECIMEN EXPIRATION DATE: NORMAL
T PALLIDUM AB SER QL: NONREACTIVE

## 2023-09-20 PROCEDURE — 250N000011 HC RX IP 250 OP 636: Mod: JZ

## 2023-09-20 PROCEDURE — 250N000011 HC RX IP 250 OP 636: Performed by: OBSTETRICS & GYNECOLOGY

## 2023-09-20 PROCEDURE — 59410 OBSTETRICAL CARE: CPT | Performed by: OBSTETRICS & GYNECOLOGY

## 2023-09-20 PROCEDURE — 999N000016 HC STATISTIC ATTENDANCE AT DELIVERY

## 2023-09-20 PROCEDURE — 10907ZC DRAINAGE OF AMNIOTIC FLUID, THERAPEUTIC FROM PRODUCTS OF CONCEPTION, VIA NATURAL OR ARTIFICIAL OPENING: ICD-10-PCS | Performed by: OBSTETRICS & GYNECOLOGY

## 2023-09-20 PROCEDURE — 258N000003 HC RX IP 258 OP 636: Performed by: OBSTETRICS & GYNECOLOGY

## 2023-09-20 PROCEDURE — 86780 TREPONEMA PALLIDUM: CPT | Performed by: OBSTETRICS & GYNECOLOGY

## 2023-09-20 PROCEDURE — 86850 RBC ANTIBODY SCREEN: CPT | Performed by: OBSTETRICS & GYNECOLOGY

## 2023-09-20 PROCEDURE — 250N000013 HC RX MED GY IP 250 OP 250 PS 637: Performed by: OBSTETRICS & GYNECOLOGY

## 2023-09-20 PROCEDURE — 120N000001 HC R&B MED SURG/OB

## 2023-09-20 PROCEDURE — 86901 BLOOD TYPING SEROLOGIC RH(D): CPT | Performed by: OBSTETRICS & GYNECOLOGY

## 2023-09-20 PROCEDURE — 85018 HEMOGLOBIN: CPT | Performed by: OBSTETRICS & GYNECOLOGY

## 2023-09-20 PROCEDURE — 722N000001 HC LABOR CARE VAGINAL DELIVERY SINGLE

## 2023-09-20 PROCEDURE — 250N000009 HC RX 250: Performed by: OBSTETRICS & GYNECOLOGY

## 2023-09-20 RX ORDER — OXYTOCIN/0.9 % SODIUM CHLORIDE 30/500 ML
340 PLASTIC BAG, INJECTION (ML) INTRAVENOUS CONTINUOUS PRN
Status: DISCONTINUED | OUTPATIENT
Start: 2023-09-20 | End: 2023-09-22 | Stop reason: HOSPADM

## 2023-09-20 RX ORDER — METOCLOPRAMIDE 10 MG/1
10 TABLET ORAL EVERY 6 HOURS PRN
Status: DISCONTINUED | OUTPATIENT
Start: 2023-09-20 | End: 2023-09-20

## 2023-09-20 RX ORDER — MODIFIED LANOLIN
OINTMENT (GRAM) TOPICAL
Status: DISCONTINUED | OUTPATIENT
Start: 2023-09-20 | End: 2023-09-22 | Stop reason: HOSPADM

## 2023-09-20 RX ORDER — OXYTOCIN/0.9 % SODIUM CHLORIDE 30/500 ML
100-340 PLASTIC BAG, INJECTION (ML) INTRAVENOUS CONTINUOUS PRN
Status: DISCONTINUED | OUTPATIENT
Start: 2023-09-20 | End: 2023-09-20

## 2023-09-20 RX ORDER — CITRIC ACID/SODIUM CITRATE 334-500MG
30 SOLUTION, ORAL ORAL
Status: DISCONTINUED | OUTPATIENT
Start: 2023-09-20 | End: 2023-09-20

## 2023-09-20 RX ORDER — MISOPROSTOL 200 UG/1
800 TABLET ORAL
Status: DISCONTINUED | OUTPATIENT
Start: 2023-09-20 | End: 2023-09-20

## 2023-09-20 RX ORDER — FERROUS SULFATE 325(65) MG
324 TABLET ORAL
Status: DISCONTINUED | OUTPATIENT
Start: 2023-09-21 | End: 2023-09-21

## 2023-09-20 RX ORDER — HYDROCORTISONE 25 MG/G
CREAM TOPICAL 3 TIMES DAILY PRN
Status: DISCONTINUED | OUTPATIENT
Start: 2023-09-20 | End: 2023-09-22 | Stop reason: HOSPADM

## 2023-09-20 RX ORDER — LEVETIRACETAM 500 MG/1
1000 TABLET ORAL 2 TIMES DAILY
Status: DISCONTINUED | OUTPATIENT
Start: 2023-09-20 | End: 2023-09-22 | Stop reason: HOSPADM

## 2023-09-20 RX ORDER — METOCLOPRAMIDE HYDROCHLORIDE 5 MG/ML
10 INJECTION INTRAMUSCULAR; INTRAVENOUS EVERY 6 HOURS PRN
Status: DISCONTINUED | OUTPATIENT
Start: 2023-09-20 | End: 2023-09-20

## 2023-09-20 RX ORDER — LIDOCAINE 40 MG/G
CREAM TOPICAL
Status: DISCONTINUED | OUTPATIENT
Start: 2023-09-20 | End: 2023-09-20

## 2023-09-20 RX ORDER — OXYTOCIN 10 [USP'U]/ML
10 INJECTION, SOLUTION INTRAMUSCULAR; INTRAVENOUS
Status: DISCONTINUED | OUTPATIENT
Start: 2023-09-20 | End: 2023-09-22 | Stop reason: HOSPADM

## 2023-09-20 RX ORDER — BISACODYL 10 MG
10 SUPPOSITORY, RECTAL RECTAL DAILY PRN
Status: DISCONTINUED | OUTPATIENT
Start: 2023-09-20 | End: 2023-09-22 | Stop reason: HOSPADM

## 2023-09-20 RX ORDER — NALOXONE HYDROCHLORIDE 0.4 MG/ML
0.4 INJECTION, SOLUTION INTRAMUSCULAR; INTRAVENOUS; SUBCUTANEOUS
Status: DISCONTINUED | OUTPATIENT
Start: 2023-09-20 | End: 2023-09-20

## 2023-09-20 RX ORDER — FAMOTIDINE 20 MG/1
20 TABLET, FILM COATED ORAL 2 TIMES DAILY
Status: DISCONTINUED | OUTPATIENT
Start: 2023-09-20 | End: 2023-09-22 | Stop reason: HOSPADM

## 2023-09-20 RX ORDER — OXYTOCIN/0.9 % SODIUM CHLORIDE 30/500 ML
1-24 PLASTIC BAG, INJECTION (ML) INTRAVENOUS CONTINUOUS
Status: DISCONTINUED | OUTPATIENT
Start: 2023-09-20 | End: 2023-09-20

## 2023-09-20 RX ORDER — OXYTOCIN/0.9 % SODIUM CHLORIDE 30/500 ML
340 PLASTIC BAG, INJECTION (ML) INTRAVENOUS CONTINUOUS PRN
Status: DISCONTINUED | OUTPATIENT
Start: 2023-09-20 | End: 2023-09-20

## 2023-09-20 RX ORDER — KETOROLAC TROMETHAMINE 30 MG/ML
30 INJECTION, SOLUTION INTRAMUSCULAR; INTRAVENOUS
Status: DISCONTINUED | OUTPATIENT
Start: 2023-09-20 | End: 2023-09-20

## 2023-09-20 RX ORDER — TRANEXAMIC ACID 10 MG/ML
1 INJECTION, SOLUTION INTRAVENOUS EVERY 30 MIN PRN
Status: DISCONTINUED | OUTPATIENT
Start: 2023-09-20 | End: 2023-09-20

## 2023-09-20 RX ORDER — LEVETIRACETAM 500 MG/1
1000 TABLET ORAL 2 TIMES DAILY
Status: DISCONTINUED | OUTPATIENT
Start: 2023-09-20 | End: 2023-09-20

## 2023-09-20 RX ORDER — SODIUM CHLORIDE, SODIUM LACTATE, POTASSIUM CHLORIDE, CALCIUM CHLORIDE 600; 310; 30; 20 MG/100ML; MG/100ML; MG/100ML; MG/100ML
INJECTION, SOLUTION INTRAVENOUS CONTINUOUS PRN
Status: DISCONTINUED | OUTPATIENT
Start: 2023-09-20 | End: 2023-09-20

## 2023-09-20 RX ORDER — KETOROLAC TROMETHAMINE 30 MG/ML
INJECTION, SOLUTION INTRAMUSCULAR; INTRAVENOUS
Status: COMPLETED
Start: 2023-09-20 | End: 2023-09-20

## 2023-09-20 RX ORDER — ACETAMINOPHEN 325 MG/1
650 TABLET ORAL EVERY 4 HOURS PRN
Status: DISCONTINUED | OUTPATIENT
Start: 2023-09-20 | End: 2023-09-22 | Stop reason: HOSPADM

## 2023-09-20 RX ORDER — MISOPROSTOL 200 UG/1
400 TABLET ORAL
Status: DISCONTINUED | OUTPATIENT
Start: 2023-09-20 | End: 2023-09-22 | Stop reason: HOSPADM

## 2023-09-20 RX ORDER — FENTANYL CITRATE 50 UG/ML
100 INJECTION, SOLUTION INTRAMUSCULAR; INTRAVENOUS
Status: DISCONTINUED | OUTPATIENT
Start: 2023-09-20 | End: 2023-09-20

## 2023-09-20 RX ORDER — METHYLERGONOVINE MALEATE 0.2 MG/ML
200 INJECTION INTRAVENOUS
Status: DISCONTINUED | OUTPATIENT
Start: 2023-09-20 | End: 2023-09-20

## 2023-09-20 RX ORDER — PROCHLORPERAZINE 25 MG
25 SUPPOSITORY, RECTAL RECTAL EVERY 12 HOURS PRN
Status: DISCONTINUED | OUTPATIENT
Start: 2023-09-20 | End: 2023-09-20

## 2023-09-20 RX ORDER — CARBOPROST TROMETHAMINE 250 UG/ML
250 INJECTION, SOLUTION INTRAMUSCULAR
Status: DISCONTINUED | OUTPATIENT
Start: 2023-09-20 | End: 2023-09-22 | Stop reason: HOSPADM

## 2023-09-20 RX ORDER — MISOPROSTOL 200 UG/1
800 TABLET ORAL
Status: DISCONTINUED | OUTPATIENT
Start: 2023-09-20 | End: 2023-09-22 | Stop reason: HOSPADM

## 2023-09-20 RX ORDER — IBUPROFEN 800 MG/1
800 TABLET, FILM COATED ORAL EVERY 6 HOURS PRN
Status: DISCONTINUED | OUTPATIENT
Start: 2023-09-20 | End: 2023-09-22 | Stop reason: HOSPADM

## 2023-09-20 RX ORDER — NALOXONE HYDROCHLORIDE 0.4 MG/ML
0.2 INJECTION, SOLUTION INTRAMUSCULAR; INTRAVENOUS; SUBCUTANEOUS
Status: DISCONTINUED | OUTPATIENT
Start: 2023-09-20 | End: 2023-09-20

## 2023-09-20 RX ORDER — PROCHLORPERAZINE MALEATE 10 MG
10 TABLET ORAL EVERY 6 HOURS PRN
Status: DISCONTINUED | OUTPATIENT
Start: 2023-09-20 | End: 2023-09-20

## 2023-09-20 RX ORDER — DOCUSATE SODIUM 100 MG/1
100 CAPSULE, LIQUID FILLED ORAL DAILY
Status: DISCONTINUED | OUTPATIENT
Start: 2023-09-21 | End: 2023-09-22 | Stop reason: HOSPADM

## 2023-09-20 RX ORDER — CARBOPROST TROMETHAMINE 250 UG/ML
250 INJECTION, SOLUTION INTRAMUSCULAR
Status: DISCONTINUED | OUTPATIENT
Start: 2023-09-20 | End: 2023-09-20

## 2023-09-20 RX ORDER — IBUPROFEN 800 MG/1
800 TABLET, FILM COATED ORAL
Status: DISCONTINUED | OUTPATIENT
Start: 2023-09-20 | End: 2023-09-20

## 2023-09-20 RX ORDER — ONDANSETRON 4 MG/1
4 TABLET, ORALLY DISINTEGRATING ORAL EVERY 6 HOURS PRN
Status: DISCONTINUED | OUTPATIENT
Start: 2023-09-20 | End: 2023-09-20

## 2023-09-20 RX ORDER — ONDANSETRON 2 MG/ML
4 INJECTION INTRAMUSCULAR; INTRAVENOUS EVERY 6 HOURS PRN
Status: DISCONTINUED | OUTPATIENT
Start: 2023-09-20 | End: 2023-09-20

## 2023-09-20 RX ORDER — OXYTOCIN 10 [USP'U]/ML
10 INJECTION, SOLUTION INTRAMUSCULAR; INTRAVENOUS
Status: DISCONTINUED | OUTPATIENT
Start: 2023-09-20 | End: 2023-09-20

## 2023-09-20 RX ORDER — TRANEXAMIC ACID 10 MG/ML
1 INJECTION, SOLUTION INTRAVENOUS EVERY 30 MIN PRN
Status: DISCONTINUED | OUTPATIENT
Start: 2023-09-20 | End: 2023-09-22 | Stop reason: HOSPADM

## 2023-09-20 RX ORDER — MISOPROSTOL 200 UG/1
400 TABLET ORAL
Status: DISCONTINUED | OUTPATIENT
Start: 2023-09-20 | End: 2023-09-20

## 2023-09-20 RX ORDER — METHYLERGONOVINE MALEATE 0.2 MG/ML
200 INJECTION INTRAVENOUS
Status: DISCONTINUED | OUTPATIENT
Start: 2023-09-20 | End: 2023-09-22 | Stop reason: HOSPADM

## 2023-09-20 RX ADMIN — FENTANYL CITRATE 100 MCG: 0.05 INJECTION, SOLUTION INTRAMUSCULAR; INTRAVENOUS at 16:29

## 2023-09-20 RX ADMIN — FENTANYL CITRATE 100 MCG: 0.05 INJECTION, SOLUTION INTRAMUSCULAR; INTRAVENOUS at 17:38

## 2023-09-20 RX ADMIN — KETOROLAC TROMETHAMINE 30 MG: 30 INJECTION, SOLUTION INTRAMUSCULAR; INTRAVENOUS at 20:09

## 2023-09-20 RX ADMIN — LEVETIRACETAM 1000 MG: 500 TABLET, FILM COATED ORAL at 21:56

## 2023-09-20 RX ADMIN — Medication 340 ML/HR: at 19:47

## 2023-09-20 RX ADMIN — LEVETIRACETAM 1000 MG: 500 TABLET, FILM COATED ORAL at 10:28

## 2023-09-20 RX ADMIN — SODIUM CHLORIDE, POTASSIUM CHLORIDE, SODIUM LACTATE AND CALCIUM CHLORIDE: 600; 310; 30; 20 INJECTION, SOLUTION INTRAVENOUS at 10:40

## 2023-09-20 ASSESSMENT — ACTIVITIES OF DAILY LIVING (ADL)
ADLS_ACUITY_SCORE: 35
ADLS_ACUITY_SCORE: 35
ADLS_ACUITY_SCORE: 31
ADLS_ACUITY_SCORE: 35

## 2023-09-20 NOTE — PLAN OF CARE
1350- Activity encouraged. Monitors removed, patient up to walk. 1415- On birthing ball.

## 2023-09-20 NOTE — H&P
"     OB Admit History & Physical  2023    Nelsy Quinones  [id number]    History of Present Illness:   Nelsy Quinones  Is a 23 year old female who is  being seen for postdates induction.  Her Estimated Date of Delivery is Sep 12, 2023, and gestational age is 41w1d.  Her last menstrual period was Patient's last menstrual period was 2022..     Patient was counseled as to risks associated with postdates pregnancy and desires induction  OB History:   Estimated body mass index is 33.81 kg/m  as calculated from the following:    Height as of 23: 1.626 m (5' 4\").    Weight as of 23: 89.4 kg (197 lb).  OB History    Para Term  AB Living   2 1 1 0 0 1   SAB IAB Ectopic Multiple Live Births   0 0 0 0 1      # Outcome Date GA Lbr Kang/2nd Weight Sex Delivery Anes PTL Lv   2 Current            1 Term 21 40w3d 07:22 / 00:12 2.48 kg (5 lb 7.5 oz) M Vag-Spont None N YAJAIRA      Name: PATY BRYSON      Apgar1: 7  Apgar5: 9       Her prenatal course has been complicated by anemia and a Hx of seizure disorder.  Estimated fetal weight =  3400gm    Past Medical History:   Past Medical History:   Diagnosis Date    Anemia     Chlamydia     Seizure disorder      Past Surgical History:   Procedure Laterality Date    NO PAST SURGERIES         Medications:   No current outpatient medications on file.       Allergies:   Patient has no known allergies.          Physical Exam:  Vitals:  Blood pressure 128/82, temperature 97.8  F (36.6  C), temperature source Oral, resp. rate 24, last menstrual period 2022.   FHT rate at 140s bpm ,without ctx presently  GEN: Alert Awake in NAD  Cervix and Dilation:  3/80/-2  AROM clear fluid  Presentation: cephalic    Labs:   Hemoglobin   Date Value Ref Range Status   2023 8.6 (L) 11.7 - 15.7 g/dL Final   08/10/2023 8.3 (L) 11.7 - 15.7 g/dL Final      GBS neg    Assessment and Plan:   Intrauterine pregnancy at 41w1d  complicated by anemia and a " seizure disorder.  On Keppra with no seizure since 2109 who presents for postdates induction.    1. Admit to L&D  2. AROM clar fluid  3. Pitocin induction if no labor within 3-4 hrs        Drew Swain MD   Dept of OB/GYN  September 20, 2023

## 2023-09-20 NOTE — PROVIDER NOTIFICATION
09/20/23 1145   Provider Notification   Provider Name/Title Dr. Swain   Method of Notification At Bedside   Request Evaluate in Person     1145- Having occasional contractions, admits to feeling q 20 minutes. Pitocin infusion discussed. Patient declined at this time.

## 2023-09-20 NOTE — PROVIDER NOTIFICATION
Data: Patient presented to [facility]: 2023  8:37 AM.  Patient admitted for induction for postdates. Patient is a .  Prenatal record reviewed. Pregnancy  has been complicated by anemia.  Gestational Age 41w1d. VSS. Fetal movement active. Patient denies leaking of vaginal fluid/rupture of membranes, bleeding, headache, or significant edema. Support person is present.   Action: Verbal consent for EFM.  Admission assessment completed. Bill of rights reviewed.  Response: Patient verbalized agreement with plan. 0857- Dr Drew Swain at bedside, SVE 3/80/-2, AROM, clear fluid. Orders received.

## 2023-09-21 LAB — HGB BLD-MCNC: 8 G/DL (ref 11.7–15.7)

## 2023-09-21 PROCEDURE — 250N000013 HC RX MED GY IP 250 OP 250 PS 637: Performed by: OBSTETRICS & GYNECOLOGY

## 2023-09-21 PROCEDURE — 36415 COLL VENOUS BLD VENIPUNCTURE: CPT | Performed by: OBSTETRICS & GYNECOLOGY

## 2023-09-21 PROCEDURE — 120N000001 HC R&B MED SURG/OB

## 2023-09-21 PROCEDURE — 999N000080 HC STATISTIC IP LACTATION SERVICES 16-30 MIN

## 2023-09-21 PROCEDURE — 85018 HEMOGLOBIN: CPT | Performed by: OBSTETRICS & GYNECOLOGY

## 2023-09-21 RX ADMIN — FERROUS SULFATE TAB 325 MG (65 MG ELEMENTAL FE) 324 MG: 325 (65 FE) TAB at 11:04

## 2023-09-21 RX ADMIN — LEVETIRACETAM 1000 MG: 500 TABLET, FILM COATED ORAL at 11:03

## 2023-09-21 RX ADMIN — FAMOTIDINE 20 MG: 20 TABLET, FILM COATED ORAL at 11:03

## 2023-09-21 RX ADMIN — LEVETIRACETAM 1000 MG: 500 TABLET, FILM COATED ORAL at 21:18

## 2023-09-21 RX ADMIN — DOCUSATE SODIUM 100 MG: 100 CAPSULE, LIQUID FILLED ORAL at 11:03

## 2023-09-21 ASSESSMENT — ACTIVITIES OF DAILY LIVING (ADL)
ADLS_ACUITY_SCORE: 31

## 2023-09-21 NOTE — LACTATION NOTE
Lactation visit with Nelsy. States she breastfeed her first for a couple of months. Assisted with a feed. Baby latched well with assistance in football hold. Correct positioning and mouth to nipple shown. Wide latch, swallows pointed out to Nelsy. Reviewed supply and demand, first 24 hours sleepiness and cluster feeding post 24 hours, sts, when milk transitions. Encouraged to listen for swallows, breast compressions shown. Needing a pump for home. Answered questions,  knows to call if needed.

## 2023-09-21 NOTE — PROGRESS NOTES
Data: Nelsy Quinones transferred to 431 via wheelchair. Baby transferred via parent's arms.  Action: Receiving unit notified of transfer: Yes. Patient and family notified of room change. Report given to Sarah ROBERT. Belongings sent to receiving unit. Accompanied by Registered Nurse. Oriented patient to surroundings. Call light within reach. ID bands double-checked with receiving RN.  Response: Patient tolerated transfer and is stable.

## 2023-09-21 NOTE — PROGRESS NOTES
"Postpartum Progress Note  Nelsy Quinones  [id number]    Subjective:   Patiet doing well. Pain well controlled with POs. Denies nausea and vomiting. Ambulating without difficulty. Adequate PO intake. Breast feeding going well. Moderate lochia.     Objective:  /59 (BP Location: Right arm, Patient Position: Semi-Benton's, Cuff Size: Adult Regular)   Pulse 105   Temp 98.2  F (36.8  C) (Oral)   Resp 16   Ht 1.626 m (5' 4\")   Wt 87.5 kg (193 lb)   LMP 2022   Breastfeeding Unknown   BMI 33.13 kg/m    General: resting comfortably, in NAD  Heart: regular rate, well perfused  Lungs: non-labored breathing, no cough  Abdomen: soft, non distended, appropriately tender to palpation, FF at U -1  Extremities: scant edema, non-tender to palpation    Labs:  Hemoglobin   Date Value Ref Range Status   2023 8.0 (L) 11.7 - 15.7 g/dL Final   2023 9.0 (L) 11.7 - 15.7 g/dL Final       Assessment/Plan:  23 year old  who is PPD#1 s/p .  Currently stable and doing well  - Routine post-partum cares  - Heme: acute on chronic anemia, with contribution from acute blood loss associated with vaginal delivery. Initiate PO iron today. Patient declined IV iron.   - Pain: continue tylenol, ibuprofen, ice packs, hot packs as needed  - Baby: in room doing well  - Dispo: d/c to home tomorrow morning.     Ailyn Vogel MD  OB/GYN             "

## 2023-09-21 NOTE — L&D DELIVERY NOTE
OB Vaginal Delivery Note    Nelsy Quinones MRN# [id number]   Age: 23 year old YOB: 2000     Postdates induction with amniotomy alone    Fentanyl for labor analgesia, declined epidural    GA: 41w1d  GP:   Labor Complications: None   EBL:   mL  Delivery QBL: 25 mL  Delivery Type: Vaginal, Spontaneous   ROM to Delivery Time: (Delivered) Hours: 10 Minutes: 49  [place] Weight: 3.68 kg (8 lb 1.8 oz)    1 Minute 5 Minute 10 Minute   Apgar Totals:   7   9      PASHA ROSS;KIRSTIN RAPP     Delivery Details:  Nelsy Quinones, a 23 year old  female delivered a viable infant with apgars of   and  . Patient was fully dilated and pushing after   hours   minutes in active labor. Delivery was via vaginal, spontaneous  to a sterile field under intravenous  anesthesia. Infant delivered in vertex  right  occiput  anterior  position. Anterior and posterior shoulders delivered without difficulty. The cord was clamped, cut twice and 3 vessels  were noted. Cord blood was obtained in routine fashion with the following disposition: discard .      Cord complications: nuchal   Placenta delivered at 2023  7:48 PM . Placental disposition was Hospital disposal . Fundal massage performed and fundus found to be firm.     Episiotomy: none    Perineum, vagina, cervix were inspected, and the following lacerations were noted:   Perineal lacerations: 1st                Any lacerations were repaired in the usual fashion using N/A.    Excellent hemostasis was noted. Needle count correct. Infant and patient in delivery room in good and stable condition.        Kevin Quinones [[id number]]      Labor Event Times      Latent labor onset date/time: 2023 1630    Active labor onset date: 23 Onset time:  5:51 PM CDT          Labor Length      3rd Stage (hrs): 0 (min): 1          Rupture date/time: 23 0857   Rupture type: Artificial Rupture of Membranes  Fluid color: Clear  Fluid odor:  "Normal     Induction: AROM  Induction date/time:      Cervical ripening date/time:      Indications for induction: Post-term Gestation, Other Pregnant Patient Indications (Comment to specify)     Augmentation: None       Delivery/Placenta Date and Time      Delivery Date: 23 Delivery Time:  7:46 PM   Placenta Date/Time: 2023  7:48 PM  Oxytocin given at the time of delivery: after delivery of placenta  Delivering clinician: Drew Swain MD   Other personnel present at delivery:  Provider Role   Cynthia Rubalcava RN Delivery Nurse   Carole Umana RN              Vaginal Counts       Initial count performed by 2 team members:  Two Team Members   Dr. Wiliam STARR RN         [place] Suture Needles Sponges (RETIRED) Instruments   Initial counts 2  5    Added to count       Relief counts       Final counts 2  5            Placed during labor Accounted for at the end of labor   FSE No NA   IUPC No NA   Cervidil No NA                  Final count performed by 2 team members:  Two Team Members   Dr. Wiliam STARR RN      Final count correct?: Yes  Pre-Birth Team Brief: Complete  Post-Birth Team Debrief: Complete       Apgars    Living status: Living   1 Minute 5 Minute 10 Minute 15 Minute 20 Minute   Skin color:        Heart rate:        Reflex irritability:        Muscle tone:        Respiratory effort:        Total:               Cord      Vessels: 3 Vessels    Cord Complications: Nuchal   Nuchal Intervention: delivered through         Nuchal cord description: loose nuchal cord         Cord Blood Disposition: Discard    Gases Sent?: No    Delayed cord clamping?: Yes    Cord Clamping Delay (seconds): 31-60 seconds    Stem cell collection?: No            Resuscitation    Output in Delivery Room: Stool       [place] Measurements      Weight: 8 lb 1.8 oz Length: 1' 8.25\"     Head circumference: 33.7 cm    Output in delivery room: Stool       Labor Events and Shoulder Dystocia  "   Fetal Tracing Prior to Delivery: Category 2  Shoulder dystocia present?: Neg       Delivery (Maternal) (Provider to Complete) (817307)    Episiotomy: None  Perineal lacerations: 1st Repaired?: No   Repair suture: None  Genital tract inspection done: Pos       Blood Loss  Mother: Nelsy Quinones #[id number]     Start of Mother's Information      Delivery Blood Loss  09/20/23 1751 - 09/20/23 2006      Delivery QBL (mL) Hospital Encounter 25 mL    Total  25 mL               End of Mother's Information  Mother: Nelsy Quinones #[id number]                Delivery - Provider to Complete ([id number])    Delivering clinician: Drew Swain MD  Delivery Type (Choose the 1 that will go to the Birth History): Vaginal, Spontaneous                         Other personnel:  Provider Role   Cynthia Rubalcava, RN Delivery Nurse   Carole Umana RN                     Placenta    Date/Time: 9/20/2023  7:48 PM  Removal: Spontaneous  Comments: intact, 3 vesel cord  Disposition: Hospital disposal             Anesthesia    Method: INTRAVENOUS                     Presentation and Position    Presentation: Vertex    Position: Right Occiput Anterior                     Drew Swain MD

## 2023-09-21 NOTE — PROGRESS NOTES
D) SWS responding to automatic referral placed due to patient having a suicide attempt noted in 8/2022.   I) SWS met with MOB & FOB who is supportive & involved. They live together in a home  in [place] & this is Nelsy's second child. They are prepared for baby at home and are aware of [facility]. Nelsy shared they have had trouble getting to the [facility] office to verify benefits. SW provided [facility] contact information as she shared they have been driving to the office in [place]. Nelsy denied any history of anxiety or depression. She voiced she had postpartum depression after the birth of her first child. She is not currently seeing a therapist or on any medication related to her mood. Nelsy voiced she is feeling fine & denied any mood concerns at this time. SWS discussed baby blues/postpartum depression and gave information on this. SWS also gave Parent Resource Guide with SWS contact information.   A) Patient is A&O with good eye contact & somewhat limited answers to questions. She is bonding well with baby. Extended family is supportive & involved per MOB.  P) No further d/c needs at this time. SWS available upon request.  Monae Montano, REID  [facility]  9/21/2023  1:43 PM

## 2023-09-21 NOTE — PLAN OF CARE
Pt VSS. Postpartum checks WDL. Is bonding well with infant. Tolerating regular diet and able to ambulate independently. Urine output adequate, voids without difficulty..  Pt denied any pain medication. Breastfeeding infant every 2-3 hours.

## 2023-09-21 NOTE — CONSULTS
See ANICETO's previous note from 9/21/23 at 1:39 PM.     ESTELA King  [facility]  9/21/2023  1:45 PM

## 2023-09-22 VITALS
HEART RATE: 95 BPM | TEMPERATURE: 97.9 F | WEIGHT: 193 LBS | SYSTOLIC BLOOD PRESSURE: 133 MMHG | DIASTOLIC BLOOD PRESSURE: 73 MMHG | BODY MASS INDEX: 32.95 KG/M2 | RESPIRATION RATE: 16 BRPM | HEIGHT: 64 IN

## 2023-09-22 PROCEDURE — 250N000013 HC RX MED GY IP 250 OP 250 PS 637: Performed by: OBSTETRICS & GYNECOLOGY

## 2023-09-22 PROCEDURE — 999N000079 HC STATISTIC IP LACTATION SERVICES 1-15 MIN

## 2023-09-22 RX ORDER — FERROUS GLUCONATE 324(38)MG
324 TABLET ORAL
Qty: 90 TABLET | Refills: 1 | Status: SHIPPED | OUTPATIENT
Start: 2023-09-22 | End: 2024-02-27

## 2023-09-22 RX ORDER — BREAST PUMP
1 EACH MISCELLANEOUS PRN
Qty: 1 EACH | Refills: 0 | Status: SHIPPED | OUTPATIENT
Start: 2023-09-22 | End: 2024-02-27

## 2023-09-22 RX ORDER — IBUPROFEN 600 MG/1
600 TABLET, FILM COATED ORAL EVERY 6 HOURS PRN
Qty: 60 TABLET | Refills: 0 | Status: SHIPPED | OUTPATIENT
Start: 2023-09-22 | End: 2024-02-27

## 2023-09-22 RX ORDER — DOCUSATE SODIUM 100 MG/1
100 CAPSULE, LIQUID FILLED ORAL 2 TIMES DAILY PRN
Qty: 60 CAPSULE | Refills: 0 | Status: SHIPPED | OUTPATIENT
Start: 2023-09-22 | End: 2024-02-27

## 2023-09-22 RX ADMIN — DOCUSATE SODIUM 100 MG: 100 CAPSULE, LIQUID FILLED ORAL at 08:41

## 2023-09-22 RX ADMIN — LEVETIRACETAM 1000 MG: 500 TABLET, FILM COATED ORAL at 08:41

## 2023-09-22 RX ADMIN — Medication 1 TABLET: at 08:41

## 2023-09-22 ASSESSMENT — ACTIVITIES OF DAILY LIVING (ADL)
CHANGE_IN_FUNCTIONAL_STATUS_SINCE_ONSET_OF_CURRENT_ILLNESS/INJURY: NO
DRESSING/BATHING_DIFFICULTY: NO
ADLS_ACUITY_SCORE: 18
TOILETING_ISSUES: NO
WALKING_OR_CLIMBING_STAIRS_DIFFICULTY: NO
ADLS_ACUITY_SCORE: 18
DOING_ERRANDS_INDEPENDENTLY_DIFFICULTY: NO
DIFFICULTY_COMMUNICATING: NO
ADLS_ACUITY_SCORE: 18
ADLS_ACUITY_SCORE: 31
ADLS_ACUITY_SCORE: 31
CONCENTRATING,_REMEMBERING_OR_MAKING_DECISIONS_DIFFICULTY: NO
WEAR_GLASSES_OR_BLIND: NO
ADLS_ACUITY_SCORE: 31
ADLS_ACUITY_SCORE: 31
DIFFICULTY_EATING/SWALLOWING: NO
FALL_HISTORY_WITHIN_LAST_SIX_MONTHS: NO

## 2023-09-22 NOTE — PLAN OF CARE
Vitals stable. Postpartum checks within normal limits. Ambulating independently. Voiding spontaneously. Denied need for any pain medications. Breastfeeding every 2-3 hours, feeding well, baby sleepy today after circumcision. Breast pump given to mom and education complete. Discharge education complete with mom and spouse. Paperwork signed. Discharge medications given to patient. Bands checked. Walked out with staff at 1315.

## 2023-09-22 NOTE — DISCHARGE SUMMARY
[facility] Discharge Summary    Nelsy Quinones MRN# [id number]   Age: 23 year old YOB: 2000     Date of Admission:  2023  Date of Discharge::  2023  Admitting Physician:  Drew Swain MD  Discharge Physician:  Drew Swain MD     Home clinic: [facility]          Admission Diagnoses:   Labor and delivery, indication for care [O75.9]  Post dates pregnancy  Anemia  Seizure disorder          Discharge Diagnosis:     Normal spontaneous vaginal delivery  Intrauterine pregnancy at 41 weeks gestation          Procedures:     Procedure(s): No additional procedures performed       No procedures performed during this admission           Medications Prior to Admission:     Medications Prior to Admission   Medication Sig Dispense Refill Last Dose    famotidine (PEPCID) 20 MG tablet Take 20 mg by mouth 2 times daily   Unknown    ferrous gluconate (FERGON) 324 (38 Fe) MG tablet Take 324 mg by mouth   Past Week    folic acid (FOLVITE) 1 MG tablet Take 1 mg by mouth daily   Unknown    levETIRAcetam (KEPPRA) 500 MG tablet Take 1,000 mg by mouth 2 times daily   2023             Discharge Medications:     Current Discharge Medication List        CONTINUE these medications which have NOT CHANGED    Details   famotidine (PEPCID) 20 MG tablet Take 20 mg by mouth 2 times daily      ferrous gluconate (FERGON) 324 (38 Fe) MG tablet Take 324 mg by mouth      folic acid (FOLVITE) 1 MG tablet Take 1 mg by mouth daily      levETIRAcetam (KEPPRA) 500 MG tablet Take 1,000 mg by mouth 2 times daily      Colace 100 mg BID prn  Ibuprofen 600mg Q6hrs prn               Consultations:   No consultations were requested during this admission          Brief History of Labor:   Admitted for postdates inductiona t 41w1d.  Amniotomy performed and labored ensued.  Fentanyl IV for pain.   of an 8lb 1 oz male infant at 1948 on            Hospital Course:   The  patient's hospital course was unremarkable.  On discharge, her pain was well controlled. Vaginal bleeding is similar to peak menstrual flow.  Voiding without difficulty.  Ambulating well and tolerating a normal diet.  No fever.  Breastfeeding well.  Infant is stable.  No bowel movement yet.*  She was discharged on post-partum day #2  PP Hgb was 8.0 down from 9.0 on admission.  She has longstanding chronic Fe deficiency anemia and was asymptomatic.  She declined Venofer during hospitalization    Social Work Consult was obtained during PP stay      Post-partum hemoglobin:   Hemoglobin   Date Value Ref Range Status   09/21/2023 8.0 (L) 11.7 - 15.7 g/dL Final             Discharge Instructions and Follow-Up:     Discharge diet: Regular   Discharge activity: Activity as tolerated   Discharge follow-up: Follow up with me in 6 weeks   Wound care: N/A           Discharge Disposition:     Discharged to home        Drew Swain MD

## 2023-09-22 NOTE — CONSULTS
SW aware of consult placed due to patient's EPDS score of 12. SWmet with patient & completed assessment related to risk for postpartum mood disorders on 9/21/23. SW provided brochure with emergency & non-emergency help lines. Per discussion with nursing no additional SW needs identified. Please see note from 9/21/23.    ESTELA King  [facility]  9/22/2023  9:26 AM

## 2023-09-22 NOTE — LACTATION NOTE
Lactation visit:  Nelsy has been breastfeeding infant, she feels that feedings have been going well. Infant was due to eat. Infant was placed STS on mother in the football hold however was uninterested I feeding at this time. Infant had circumcision done recently and was sleepy at breast. Discussed keeping infant STS and hand expressing colostrum if infant did not wake for next feeding. Mother agreed to this plan. Spectra pump given to pt and lactation resources sheet given as well. No concerns or questions at this time.

## 2023-09-22 NOTE — PLAN OF CARE
Goal Outcome Evaluation:      Plan of Care Reviewed With: patient    Overall Patient Progress: improvingOverall Patient Progress: improving    Outcome Evaluation: Patient is discharging 9/22 - breastfeeding.    Data: Vital signs within normal limits. Postpartum checks within normal limits - see flow record. Patient eating and drinking normally. Patient able to empty bladder independently and is up ambulating. No apparent signs of infection. First degree perineal laceration healing well. Patient performing self cares and is able to care for infant.  is in the room- not a whole lot of interaction between himself and patient or baby.   Action: Patient medicated during the shift for cramping. See MAR. Patient reassessed within 1 hour after each medication and pain was improved - patient stated she was comfortable. Patient education done about breastfeeding education, reminding patient to fill out paperwork, such as the birth certificate, postpartum depression scale- in which patient scored a 12. Orders placed for SW to see patient re- PPD score on 9/22, patient had been seen today by SW- for history of suicide attempt a year ago- ( See Note). See flow record. Keppra BID- for history of seizure disorder.   Response: Positive attachment behaviors observed with infant.  Plan: Anticipate discharge on 9/22/23

## 2023-09-22 NOTE — PLAN OF CARE
Pt VSS, tachycardic but WDL. Postpartum assessments WDL. Is bonding well with infant. Tolerating regular diet and able to ambulate independently. Urine output adequate, voiding without difficulty.  Pt has denied any pain medications, states pain is tolerable. Pt is breastfeeding infant every 2-3 hours.

## 2023-09-22 NOTE — DISCHARGE INSTRUCTIONS
Warning Signs after Having a Baby    Keep this paper on your fridge or somewhere else where you can see it.    Call your provider if you have any of these symptoms up to 12 weeks after having your baby.    Thoughts of hurting yourself or your baby  Pain in your chest or trouble breathing  Severe headache not helped by pain medicine  Eyesight concerns (blurry vision, seeing spots or flashes of light, other changes to eyesight)  Fainting, shaking or other signs of a seizure    Call 9-1-1 if you feel that it is an emergency.     The symptoms below can happen to anyone after giving birth. They can be very serious. Call your provider if you have any of these warning signs.    My provider s phone number: _______________________    Losing too much blood (hemorrhage)    Call your provider if you soak through a pad in less than an hour or pass blood clots bigger than a golf ball. These may be signs that you are bleeding too much.    Blood clots in the legs or lungs    After you give birth, your body naturally clots its blood to help prevent blood loss. Sometimes this increased clotting can happen in other areas of the body, like the legs or lungs. This can block your blood flow and be very dangerous.     Call your provider if you:  Have a red, swollen spot on the back of your leg that is warm or painful when you touch it.   Are coughing up blood.     Infection    Call your provider if you have any of these symptoms:  Fever of 100.4 F (38 C) or higher.  Pain or redness around your stitches if you had an incision.   Any yellow, white, or green fluid coming from places where you had stitches or surgery.    Mood Problems (postpartum depression)    Many people feel sad or have mood changes after having a baby. But for some people, these mood swings are worse.     Call your provider right away if you feel so anxious or nervous that you can't care for yourself or your baby.    Preeclampsia (high blood pressure)    Even if you  didn't have high blood pressure when you were pregnant, you are at risk for the high blood pressure disease called preeclampsia. This risk can last up to 12 weeks after giving birth.     Call your provider if you have:   Pain on your right side under your rib cage  Sudden swelling in the hands and face    Remember: You know your body. If something doesn't feel right, get medical help.     For informational purposes only. Not to replace the advice of your health care provider. Copyright 2020 [facility]. All rights reserved. Clinically reviewed by Martine Block, RNC-OB, MSN. [employer] [id number] - Rev 02/23.

## 2023-09-24 ENCOUNTER — PATIENT OUTREACH (OUTPATIENT)
Dept: CARE COORDINATION | Facility: CLINIC | Age: 23
End: 2023-09-24
Payer: COMMERCIAL

## 2023-09-24 NOTE — PROGRESS NOTES
[facility] Contact  [facility]/Voicemail     Clinical Data: Post-Discharge Outreach     Outreach attempted x 2.  Left message on patient's voicemail, providing [facility]'s 24/7 scheduling and nurse triage phone number [phone] ([phone]) for questions/concerns and/or to schedule an appt with an [facility] provider, if they do not have a PCP.      Plan:  [facility] will do no further outreaches at this time.       Funmi Mora RN  [facility] Resource [place], [facility]    *Connected Care Resource Team does NOT follow patient ongoing. Referrals are identified based on internal discharge reports and the outreach is to ensure patient has an understanding of their discharge instructions.

## 2023-11-27 ENCOUNTER — HOSPITAL ENCOUNTER (EMERGENCY)
Facility: CLINIC | Age: 23
Discharge: HOME | End: 2023-11-27
Attending: EMERGENCY MEDICINE | Admitting: EMERGENCY MEDICINE
Payer: COMMERCIAL

## 2023-11-27 VITALS
SYSTOLIC BLOOD PRESSURE: 149 MMHG | DIASTOLIC BLOOD PRESSURE: 89 MMHG | RESPIRATION RATE: 16 BRPM | OXYGEN SATURATION: 99 % | TEMPERATURE: 101.8 F | HEART RATE: 125 BPM

## 2023-11-27 DIAGNOSIS — U07.1 COVID-19 VIRUS INFECTION: ICD-10-CM

## 2023-11-27 PROCEDURE — 87637 SARSCOV2&INF A&B&RSV AMP PRB: CPT | Performed by: EMERGENCY MEDICINE

## 2023-11-27 PROCEDURE — 99283 EMERGENCY DEPT VISIT LOW MDM: CPT

## 2023-11-27 PROCEDURE — 250N000013 HC RX MED GY IP 250 OP 250 PS 637: Performed by: EMERGENCY MEDICINE

## 2023-11-27 RX ORDER — ACETAMINOPHEN 500 MG
1000 TABLET ORAL ONCE
Status: COMPLETED | OUTPATIENT
Start: 2023-11-27 | End: 2023-11-27

## 2023-11-27 RX ADMIN — ACETAMINOPHEN 1000 MG: 500 TABLET, FILM COATED ORAL at 19:31

## 2023-11-28 NOTE — ED PROVIDER NOTES
History     Chief Complaint:  Cold Symptoms, Headache, and Fever       HPI   Nelsy Quinones is a 23 year old female runny nose cough cold fever and body aches.  Just over 1 days duration.  No wheeze chest pain or sob.  No abd pain NVD      Independent Historian:        Review of External Notes:      Medications:    docusate sodium (COLACE) 100 MG capsule  docusate sodium (COLACE) 100 MG capsule  ferrous gluconate (FERGON) 324 (38 Fe) MG tablet  ferrous gluconate (FERGON) 324 (38 Fe) MG tablet  ibuprofen (ADVIL/MOTRIN) 600 MG tablet  ibuprofen (ADVIL/MOTRIN) 600 MG tablet  levETIRAcetam (KEPPRA) 500 MG tablet  Misc. Devices (BREAST PUMP) MISC  Misc. Devices (BREAST PUMP) MISC        Past Medical History:    Past Medical History:   Diagnosis Date    Anemia     Chlamydia     Seizure disorder        Past Surgical History:    Past Surgical History:   Procedure Laterality Date    NO PAST SURGERIES            Physical Exam   Patient Vitals for the past 24 hrs:   BP Temp Temp src Pulse Resp SpO2   11/27/23 1927 (!) 149/89 -- -- (!) 125 16 99 %   11/27/23 1926 -- (!) 101.8  F (38.8  C) Oral -- -- --        Physical Exam  General: Patient is well appearing. No distress.  Head: Atraumatic.  Nose clear rhinorrhea  Eyes: Conjunctivae and EOM are normal. No scleral icterus.  Neck: Normal range of motion. Neck supple.   Cardiovascular: Normal rate, regular rhythm, normal heart sounds and intact distal pulses.   Pulmonary/Chest: Breath sounds normal. No respiratory distress.  Abdominal: Soft. Bowel sounds are normal. No distension. No tenderness. No rebound or guarding.   Musculoskeletal: Normal range of motion.  Skin: Warm and dry. No rash noted. Not diaphoretic.      Emergency Department Course   ECG      Imaging:  No orders to display         Laboratory:  Labs Ordered and Resulted from Time of ED Arrival to Time of ED Departure   INFLUENZA A/B, RSV, & SARS-COV2 PCR - Abnormal       Result Value    Influenza A PCR  Negative      Influenza B PCR Negative      RSV PCR Negative      SARS CoV2 PCR Positive (*)         Procedures       Emergency Department Course & Assessments:             Interventions:  Medications   acetaminophen (TYLENOL) tablet 1,000 mg (1,000 mg Oral $Given 23)        Assessments:      Independent Interpretation (X-rays, CTs, rhythm strip):      Consultations/Discussion of Management or Tests:         Social Determinants of Health affecting care:       Disposition:  The patient was discharged to home.     Impression & Plan        Medical Decision MakinYOF who presents for evaluation of viral syndrome. Given that the patient is otherwise hemodynamically stable without significant hypoxia, I do not believe that the patient requires admission here today. They are at risk for pneumonia but no signs of this are detected on today's visit. Return to the ED for high fevers > 103 for more than 48 hours more, increasing productive cough, shortness of breath, or confusion.  There is no signs of serious bacterial infection such as bacteremia, meningitis, UTI/pyelonephritis, strep pharyngitis, etc. I discussed my findings and plan with the patient and they are amenable at this time.  All questions were answered and patient will be discharged home in stable condition.  Diagnosis:    ICD-10-CM    1. COVID-19 virus infection  U07.1            Discharge Medications:  New Prescriptions    No medications on file            2023   Murphy Enriquez MD Stevens, Andrew C, MD  23 [id number]

## 2023-11-28 NOTE — ED TRIAGE NOTES
Cold symptoms, headache, fatigue, fever for the past couple days. In triage, respirations are even and unlabored. No tylenol and advil today.

## 2023-11-30 ENCOUNTER — HOSPITAL ENCOUNTER (EMERGENCY)
Facility: CLINIC | Age: 23
Discharge: HOME | End: 2023-11-30
Attending: EMERGENCY MEDICINE | Admitting: EMERGENCY MEDICINE
Payer: COMMERCIAL

## 2023-11-30 ENCOUNTER — NURSE TRIAGE (OUTPATIENT)
Dept: NURSING | Facility: CLINIC | Age: 23
End: 2023-11-30
Payer: COMMERCIAL

## 2023-11-30 VITALS
HEART RATE: 65 BPM | TEMPERATURE: 96.8 F | RESPIRATION RATE: 18 BRPM | DIASTOLIC BLOOD PRESSURE: 93 MMHG | SYSTOLIC BLOOD PRESSURE: 131 MMHG | OXYGEN SATURATION: 99 %

## 2023-11-30 DIAGNOSIS — U07.1 INFECTION DUE TO 2019 NOVEL CORONAVIRUS: ICD-10-CM

## 2023-11-30 PROBLEM — O12.03 EDEMA IN PREGNANCY IN THIRD TRIMESTER: Status: ACTIVE | Noted: 2021-10-19

## 2023-11-30 PROBLEM — F33.41 RECURRENT MAJOR DEPRESSIVE DISORDER, IN PARTIAL REMISSION (H): Status: ACTIVE | Noted: 2022-09-28

## 2023-11-30 PROBLEM — F39 EPISODIC MOOD DISORDER (H): Chronic | Status: ACTIVE | Noted: 2021-12-28

## 2023-11-30 PROBLEM — D64.9 ANEMIA: Status: ACTIVE | Noted: 2022-09-28

## 2023-11-30 PROBLEM — R25.1 SPELLS OF TREMBLING: Status: ACTIVE | Noted: 2018-04-02

## 2023-11-30 PROBLEM — G40.909 SEIZURE DISORDER (H): Status: ACTIVE | Noted: 2019-03-25

## 2023-11-30 PROCEDURE — 99283 EMERGENCY DEPT VISIT LOW MDM: CPT

## 2023-11-30 PROCEDURE — 250N000013 HC RX MED GY IP 250 OP 250 PS 637: Performed by: EMERGENCY MEDICINE

## 2023-11-30 RX ORDER — ACETAMINOPHEN 500 MG
1000 TABLET ORAL ONCE
Status: COMPLETED | OUTPATIENT
Start: 2023-11-30 | End: 2023-11-30

## 2023-11-30 RX ADMIN — ACETAMINOPHEN 1000 MG: 500 TABLET, FILM COATED ORAL at 22:35

## 2023-12-01 NOTE — TELEPHONE ENCOUNTER
Nurse Triage SBAR    Is this a 2nd Level Triage? NO    Situation: Pt calling with concerns for ear congestion.    Background: Pt was dx with COVID recently.    Assessment: Pt states the last four hours she has been having ear congestion. It is making her off balance. Denies drainage from ear.    Protocol Recommended Disposition:   Home Care    Recommendation:  Discussed treatment options. Pt hung up when discussing being seen in person.    Reason for Disposition   Ear congestion    Additional Information   Negative: Pus or cloudy discharge from ear canal   Negative: Earache persists > 1 hour   Negative: Ear congestion present > 48 hours    Protocols used: Ear - Congestion-A-    Dania Fung RN  [facility] Nurse Advisor   11/30/2023  9:19 PM

## 2023-12-01 NOTE — DISCHARGE INSTRUCTIONS

## 2023-12-01 NOTE — ED TRIAGE NOTES
"BIBA from home for \"not feeling like myself.\" Covid diagnosed Monday in the ED here. General malaise. VSS on RA.     Triage Assessment (Adult)       Row Name 11/30/23 4987          Triage Assessment    Airway WDL WDL        Respiratory WDL    Respiratory WDL X;rhythm/pattern;cough     Rhythm/Pattern, Respiratory dyspnea upon exertion     Cough Frequency infrequent        Skin Circulation/Temperature WDL    Skin Circulation/Temperature WDL WDL        Cardiac WDL    Cardiac WDL WDL        Peripheral/Neurovascular WDL    Peripheral Neurovascular WDL WDL        Cognitive/Neuro/Behavioral WDL    Cognitive/Neuro/Behavioral WDL WDL                     "

## 2023-12-01 NOTE — ED PROVIDER NOTES
"  History     Chief Complaint:  \"Shaky feeling\"     HPI   Nelsy Quinones is a 23 year old female who presents to the ED for evaluation of ongoing symptoms related to known COVID-19 infection.  The patient was seen here in the emergency department 3 days ago where she was diagnosed with COVID-19.  Symptoms were mild at that time and she was discharged home with supportive care.  Patient called the nurse triage line today with concerns for ear congestion but did not complete the phone call.  She notes that the initial headache and congestion feeling of her COVID has improved but today she felt shaky in her body at times and generally weak and fatigued.  This concerned her so she called 9 1 bring her to the hospital.  She denies any known fevers.  No nausea vomiting or diarrhea.  No chest pain or shortness of breath.  She denies any recent seizures or syncope.  She has not taken any medication since her prior ED visit for her symptoms.  She denies any other symptoms at this time.    Independent Historian:   None - Patient Only    Review of External Notes:  None    ROS:  See HPI    Allergies:  No Known Allergies     Physical Exam   Patient Vitals for the past 24 hrs:   BP Temp Temp src Pulse Resp SpO2   11/30/23 2222 (!) 135/94 96.8  F (36  C) Temporal 77 18 100 %        Physical Exam  General: Well appearing, nontoxic. Resting comfortably  Head:  Scalp, face, and head appear normal  Eyes:  Pupils are equal, round    Conjunctivae non-injected and sclerae white  ENT:    The external nose is normal    Pinnae are normal  Neck:  Normal range of motion    There is no rigidity noted    Trachea is in the midline  CV:  Regular rate and rhythm     Normal S1/S2, no S3/S4    No murmur or rub. Radial pulses 2+ bilaterally.  Resp:  Lungs are clear and equal bilaterally  There is no tachypnea    No increased work of breathing    No rales, wheezing, or rhonchi  GI:  Abdomen is soft, no rigidity or guarding    No distension, or " mass    No tenderness or rebound tenderness   MS:  Normal muscular tone    Symmetric motor strength    No lower extremity edema  Skin:  No rash or acute skin lesions noted  Neuro:  Awake and alert  Speech is normal and fluent  Moves all extremities spontaneously  Psych: Normal affect. Appropriate interactions.     Emergency Department Course       Laboratory:  Labs Ordered and Resulted from Time of ED Arrival to Time of ED Departure - No data to display     Procedures     Emergency Department Course & Assessments:             Interventions:  Medications   acetaminophen (TYLENOL) tablet 1,000 mg (1,000 mg Oral $Given 11/30/23 2235)        Assessments, Independent Interpretation, Consult/Discussion of ManagementTests:  ED Course as of 11/30/23 2241   Thu Nov 30, 2023 2224 Patient seen and evaluated       Social Determinants of Health affecting care:  None    Disposition:  The patient was discharged to home.     Impression & Plan      Medical Decision Making:  Nelsy Quinones is a 23 year old female who presents to the ED for evaluation of symptoms related to ongoing COVID-19 infection.  On my evaluation she is well-appearing, hemodynamically stable and afebrile.  No increased work of breathing, respiratory distress or hypoxia.  Lungs are clear and equal to auscultation bilaterally.  No evidence for pneumonia, bronchospasm or other complication.  Patient's main concern today is generalized malaise and feeling shaky at times.  She has no fever at this time.  I recommended ongoing supportive care with Tylenol and ibuprofen.  I discussed with her the possibility of treatment with Paxlovid however after discussing the risk and benefits she declined this at this time.  ED evaluation is reassuring.  There is no evidence of any significant complication or indication for further testing.  I encouraged good oral fluid intake at home, rest and ongoing use of Tylenol and ibuprofen to treat symptoms.  Close follow-up with  primary care physician was recommended.  Return precautions were provided and patient was discharged in stable condition.      Diagnosis:    ICD-10-CM    1. Infection due to 2019 novel coronavirus  U07.1            Discharge Medications:  New Prescriptions    No medications on file          11/30/2023   Miguel Beck MD       Historical Data:  ______________________________________________________________________  Medications:    docusate sodium (COLACE) 100 MG capsule  docusate sodium (COLACE) 100 MG capsule  ferrous gluconate (FERGON) 324 (38 Fe) MG tablet  ferrous gluconate (FERGON) 324 (38 Fe) MG tablet  ibuprofen (ADVIL/MOTRIN) 600 MG tablet  ibuprofen (ADVIL/MOTRIN) 600 MG tablet  levETIRAcetam (KEPPRA) 500 MG tablet  Misc. Devices (BREAST PUMP) MISC  Misc. Devices (BREAST PUMP) MISC        Past Medical History:   Past Surgical History:     Past Medical History:   Diagnosis Date    Anemia     Chlamydia     Seizure disorder     Past Surgical History:   Procedure Laterality Date    NO PAST SURGERIES        Patient Active Problem List    Diagnosis Date Noted    Labor and delivery, indication for care 09/20/2023     Priority: Medium    Encounter for triage in pregnant patient 04/06/2023     Priority: Medium    Anemia 09/28/2022     Priority: Medium     Last Assessment & Plan: Formatting of this note might be different from the original. Hemoglobin after first 8.2.  Recent ED visit 10.4 with normal MCV.  We will update CBC and consider adding on iron studies. No past peripheral smear or electrophoresis.  Treat as needed.      Recurrent major depressive disorder, in partial remission (H24) 09/28/2022     Priority: Medium     Last Assessment & Plan: Formatting of this note might be different from the original. Hospitalized8/22 with suicide attempt with tylenol.   Primary Diagnoses: Major Depressive Disorder, recurrent, severe without psychosis.  She has been following every Tuesday since with therapy with Seamus  "Oliver Breaux  at [facility], [address], [place] [address] . Mood has been good.  Is not on any medication.  Baby sleeps through the night.  Is working as a  at a .  FOB involved.   PHQ-9 at f/u.      Suicide attempt (H) 08/02/2022     Priority: Medium    Episodic mood disorder (H24) 12/28/2021     Priority: Medium     Last Assessment & Plan: Formatting of this note might be different from the original. Chronic and new Recurrent depressed mood Difficult developmental history Difficult current relationship with her father of her baby Confounded also by seizure disorder See plan above      Edema in pregnancy in third trimester 10/19/2021     Priority: Medium    Seizure disorder (H) 03/25/2019     Priority: Medium     Formatting of this note might be different from the original. 3/2019 followed by [facility]. Has been prescribed Keppra for \"spells\". Doesn't take reliably. Has had normal EEGs. Last Assessment & Plan: Formatting of this note might be different from the original. History of seizure disorder on Keppra( followed by [facility]- Dr. Deepa Mason). Denies diagnosis of epilepsy but has been having seizures for 3-4 years. Notes EEG, CT scan were negative.  Last seizure 9/22, in setting of noncompliance.   Notes firsts seizure was grand mal and occurred in [place].  Was hospitalized at [facility] for this.   Recent BMP and CBC at [place] 9/22.      Spells of trembling 04/02/2018     Priority: Medium     Formatting of this note might be different from the original. Normal MRI-brain 3/21/18 and normal dedicated high resolution coronal images of medial temporal lobes 4/3/18 Normal EEG 4/3/18            Family History:    family history is not on file. Social History:   reports that she has never smoked. She has never used smokeless tobacco. She reports that she does not currently use alcohol. She reports that she does not use drugs.     PCP: Clinic, [facility]          Miguel Beck MD  11/30/23 3121

## 2023-12-12 ENCOUNTER — PRENATAL OFFICE VISIT (OUTPATIENT)
Dept: OBGYN | Facility: CLINIC | Age: 23
End: 2023-12-12
Payer: COMMERCIAL

## 2023-12-12 VITALS
DIASTOLIC BLOOD PRESSURE: 70 MMHG | BODY MASS INDEX: 31.89 KG/M2 | HEIGHT: 64 IN | SYSTOLIC BLOOD PRESSURE: 112 MMHG | WEIGHT: 186.8 LBS

## 2023-12-12 DIAGNOSIS — F32.89 OTHER DEPRESSION: ICD-10-CM

## 2023-12-12 DIAGNOSIS — R19.8 INCREASED ABDOMINAL GIRTH: ICD-10-CM

## 2023-12-12 DIAGNOSIS — N92.1 MENORRHAGIA WITH IRREGULAR CYCLE: Primary | ICD-10-CM

## 2023-12-12 LAB — HCG UR QL: NEGATIVE

## 2023-12-12 PROCEDURE — 99213 OFFICE O/P EST LOW 20 MIN: CPT | Performed by: OBSTETRICS & GYNECOLOGY

## 2023-12-12 PROCEDURE — 81025 URINE PREGNANCY TEST: CPT | Performed by: OBSTETRICS & GYNECOLOGY

## 2023-12-12 ASSESSMENT — PATIENT HEALTH QUESTIONNAIRE - PHQ9: SUM OF ALL RESPONSES TO PHQ QUESTIONS 1-9: 14

## 2023-12-12 NOTE — PROGRESS NOTES
"SUBJECTIVE:  Nelsy Quinones,  is here for a postpartum visit.  She had a  on 2023 delivering a healthy baby boy, named Betzy weighing 8 lbs 1 oz at term.      Last PHQ-9 score on record=       2023     1:03 PM   PHQ-9 SCORE   PHQ-9 Total Score 14          No data to display              Main complaint is irreg periods and abd distention post delivery      Pap: (No results found for: \"GYNINTERP\", \"PAP\" )  Not due    Delivery complications:  No  Breast feeding:  breastfeeding and formula  Bladder problems:  No  Bowel problems/hemorrhoids:  No  Episiotomy/laceration/incision healed? No  Vaginal flow:  None  Paradis:  Yes--used condoms  Contraception: none  Emotional adjustment:  tired, having some difficulties adjusting, and sad  Back to work:  N/A    12 point review of systems negative other than symptoms noted below or in the HPI.    OBJECTIVE:  Vitals: Ht 1.626 m (5' 4\")   LMP 2022   BMI 33.13 kg/m    BMI= Body mass index is 33.13 kg/m .  General - pleasant female in no acute distress.  Breast -  deferred  Abdomen -  checked for hernia with none but does have prominent diastasis      ASSESSMENT:  Pt also seen in ED twice for covid within the last 2 weeks  Suggest that and breast feeding tends to affect periods    PLAN:  May resume normal activities without restrictions.  Pap smear was not done today.    Full counseling was provided, and all questions were answered.   Return to clinic in one year for an annual visit.   Monitor cycles and see if this is a continuing issue with long periods  Declines birth control    There are no Patient Instructions on file for this visit.  Willy Vogel MD        "

## 2023-12-13 ENCOUNTER — TELEPHONE (OUTPATIENT)
Dept: BEHAVIORAL HEALTH | Facility: CLINIC | Age: 23
End: 2023-12-13
Payer: COMMERCIAL

## 2023-12-13 ENCOUNTER — MEDICAL CORRESPONDENCE (OUTPATIENT)
Dept: HEALTH INFORMATION MANAGEMENT | Facility: CLINIC | Age: 23
End: 2023-12-13
Payer: COMMERCIAL

## 2023-12-13 NOTE — TELEPHONE ENCOUNTER
Patient had appointment with her OBGYN care team. [facility] services were requested. No immediate safety/risk issues were reported or identified. Explained the role of the [facility] and provided informational handout and contact information for the [facility].     Pt shared that she has 2 children (2 years and ) and although it could be worse or harder she would like it to be easier. Appt made with [facility] for  at 12:30pm.    Neisha Asif [facility], Behavioral Health Clinician

## 2023-12-18 ENCOUNTER — OFFICE VISIT (OUTPATIENT)
Dept: BEHAVIORAL HEALTH | Facility: CLINIC | Age: 23
End: 2023-12-18
Payer: COMMERCIAL

## 2023-12-18 DIAGNOSIS — F33.1 MAJOR DEPRESSIVE DISORDER, RECURRENT EPISODE, MODERATE (H): Primary | ICD-10-CM

## 2023-12-18 PROCEDURE — 90837 PSYTX W PT 60 MINUTES: CPT | Performed by: COUNSELOR

## 2023-12-18 ASSESSMENT — COLUMBIA-SUICIDE SEVERITY RATING SCALE - C-SSRS
2. HAVE YOU ACTUALLY HAD ANY THOUGHTS OF KILLING YOURSELF?: YES
LETHALITY/MEDICAL DAMAGE CODE MOST RECENT POTENTIAL ATTEMPT: BEHAVIOR NOT LIKELY TO RESULT IN INJURY
TOTAL  NUMBER OF ACTUAL ATTEMPTS LIFETIME: 1
4. HAVE YOU HAD THESE THOUGHTS AND HAD SOME INTENTION OF ACTING ON THEM?: NO
TOTAL  NUMBER OF ABORTED OR SELF INTERRUPTED ATTEMPTS PAST 3 MONTHS: NO
2. HAVE YOU ACTUALLY HAD ANY THOUGHTS OF KILLING YOURSELF?: THOUGHTS OF BEING BETTER OFF DEAD
5. HAVE YOU STARTED TO WORK OUT OR WORKED OUT THE DETAILS OF HOW TO KILL YOURSELF? DO YOU INTEND TO CARRY OUT THIS PLAN?: YES
TOTAL  NUMBER OF ABORTED OR SELF INTERRUPTED ATTEMPTS LIFETIME: YES
6. HAVE YOU EVER DONE ANYTHING, STARTED TO DO ANYTHING, OR PREPARED TO DO ANYTHING TO END YOUR LIFE?: NO
LETHALITY/MEDICAL DAMAGE CODE MOST RECENT ACTUAL ATTEMPT: MODERATE PHYSICAL DAMAGE, MEDICAL ATTENTION NEEDED
ATTEMPT PAST THREE MONTHS: NO
2. HAVE YOU ACTUALLY HAD ANY THOUGHTS OF KILLING YOURSELF?: NO
TOTAL  NUMBER OF ABORTED OR SELF INTERRUPTED ATTEMPTS LIFETIME: 1
4. HAVE YOU HAD THESE THOUGHTS AND HAD SOME INTENTION OF ACTING ON THEM?: YES
MOST RECENT DATE: 66321
1. HAVE YOU WISHED YOU WERE DEAD OR WISHED YOU COULD GO TO SLEEP AND NOT WAKE UP?: YES
3. HAVE YOU BEEN THINKING ABOUT HOW YOU MIGHT KILL YOURSELF?: YES
TOTAL  NUMBER OF INTERRUPTED ATTEMPTS LIFETIME: NO
REASONS FOR IDEATION LIFETIME: COMPLETELY TO END OR STOP THE PAIN (YOU COULDN'T GO ON LIVING WITH THE PAIN OR HOW YOU WERE FEELING)
5. HAVE YOU STARTED TO WORK OUT OR WORKED OUT THE DETAILS OF HOW TO KILL YOURSELF? DO YOU INTEND TO CARRY OUT THIS PLAN?: NO
1. IN THE PAST MONTH, HAVE YOU WISHED YOU WERE DEAD OR WISHED YOU COULD GO TO SLEEP AND NOT WAKE UP?: NO
ATTEMPT LIFETIME: YES

## 2023-12-18 NOTE — PROGRESS NOTES
[facility]  December 18, 2023      Behavioral Health Clinician Progress Note    Patient Name: Nelsy Quinones           Service Type:  Individual      Service Location:   Face to Face in Clinic     Session Start Time: 2pm Session End Time: 3pm      Session Length: 53 - 60      Attendees: Patient     Service Modality:  In-person    Visit Activities (Refresh list every visit): NEW and [facility] Only    Diagnostic Assessment Date: will be created in the first few sessions   Treatment Plan Date: December 18, 2023  PROMIS (reviewed every 90 days):     Assessments:  The following assessments were completed by patient for this visit:    Previous PHQ-9:       12/12/2023     1:03 PM   PHQ-9 SCORE   PHQ-9 Total Score 14     DATA  Extended Session (60+ minutes): No  Interactive Complexity: No  Crisis: No  [facility] Patient: No    Treatment Objective(s) Addressed in This Session:  Target Behavior(s):  Management of depression symptoms    Depressed Mood: Increase interest, engagement, and pleasure in doing things  Decrease frequency and intensity of feeling down, depressed, hopeless  Improve quantity and quality of night time sleep / decrease daytime naps  Feel less tired and more energy during the day   Improve diet, appetite, mindful eating, and / or meal planning  Identify negative self-talk and behaviors: challenge core beliefs, myths, and actions  Improve concentration, focus, and mindfulness in daily activities   Feel less fidgety, restless or slow in daily activities / interpersonal interactions  Adjustment Difficulties: will develop coping/problem-solving skills to facilitate more adaptive adjustment    Current Stressors / Issues:  Pt shared that she has 2 sons. Her oldest recently turned 2 and her youngest is almost 3 months. She is struggling with adjusting to 2 children. 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 She was  "working at a  previously.    She and the father of her children got  in July 2023. She shared that they have had a balaji relationship due to in her pregnancy she thought another person was the father of her oldest. She reconnected with her  when their son was 8 months, causing her to raise their son alone for those beginning months. She shared this was a difficult time. She discussed raising her children continues to be difficult due to feeling like her  doesn't understand why it is hard for her to be home with her children all day. She shared it is hard for her to find time or activities to prioritize for herself.    In July 2022, she was hospitalized after a SA of overdosing on Tylenol. She was in a long-term relationship at the time and he had lied to her about visiting for her birthday. She made the choice on her own to seek medical attention after the overdose. She was hospitalized and continued outpatient therapy for a month before this ended due to her therapist moving away.     Pt shared she is struggling with symptoms of depression including: Change in sleep, Lack of interest, Excessive or inappropriate guilt, Change in energy level, Difficulties concentrating, low appetite, Ruminations, Irritability, Feeling sad, down, or depressed, and Withdrawn. She feels a lot of stress that she referred to as \"postpartum stress\" explaining the worries about her children and their wellbeing. She shared she gets headaches when she is stressed.     Finances are a concern due to her  working in a field that significantly slows down in the winter. They are working with a program for rental assistance. They have been having a lot of issues with their apartment building; including heating, front doors being unsecured, and cockroaches. They were trying to get out of their lease but this is very difficult, she is thinking they will probably be waiting out the lease through the summer. "     Progress on Treatment Objective(s) / Homework:  New Objective established this session - PREPARATION (Decided to change - considering how); Intervened by negotiating a change plan and determining options / strategies for behavior change, identifying triggers, exploring social supports, and working towards setting a date to begin behavior change    Motivational Interviewing    MI Intervention: Co-Developed Goal: build coping skills, Expressed Empathy/Understanding, Supported Autonomy, Collaboration, Evocation, Permission to raise concern or advise, Open-ended questions, and Reflections: simple and complex     Change Talk Expressed by the Patient: Desire to change Ability to change Reasons to change Need to change Committment to change Activation    Provider Response to Change Talk: E - Evoked more info from patient about behavior change, A - Affirmed patient's thoughts, decisions, or attempts at behavior change, R - Reflected patient's change talk, and S - Summarized patient's change talk statements  CBT:  Discussed common cognitive distortions identified them in patient's life, Explored ways to challenge, replace, and act against these cognitions  PSYCHODYMANIC PSYCHOTHERAPY: Discussed patient's emotional dynamics and issues and how they impact behaviors,Explored patient's history of relationships and how they impact present behaviors, Explored how to work with and make changes in these schemas and patterns  SOLUTION FOCUSED: Explored patterns in patient's relationships and discussed options for new behaviors, Explored patterns in patient's actions and choices and discussed options for new behaviors    Care Plan review completed: Yes    Medication Review:  No current psychiatric medications prescribed    Medication Compliance:  NA    Changes in Health Issues:   None reported    Chemical Use Review:   Substance Use: Chemical use reviewed, no active concerns identified      Tobacco Use: No current tobacco use.       Assessment: Current Emotional / Mental Status (status of significant symptoms):  Risk status (Self / Other harm or suicidal ideation)  Patient has had a history of suicide attempts: July 2022 overdose of Tylenol and self-injurious behavior: 1x cutting her thigh 2 years ago  Patient denies current fears or concerns for personal safety.  Patient denies current or recent suicidal ideation or behaviors.  Patient denies current or recent homicidal ideation or behaviors.  Patient denies current or recent self injurious behavior or ideation.  Patient denies other safety concerns.  A safety and risk management plan has not been developed at this time, however patient was encouraged to call [place] [phone] should there be a change in any of these risk factors.  [place] Suicide Severity Rating Scale (Lifetime/Recent)      7/31/2022    10:11 PM 8/2/2022     9:00 AM 10/9/2022    10:00 AM 12/18/2023     2:00 PM   [place] Suicide Severity Rating (Lifetime/Recent)   Q1 Wish to be Dead (Lifetime)  Yes     Q2 Non-Specific Active Suicidal Thoughts (Lifetime)  Yes     Most Severe Ideation Rating (Lifetime)  NA     Frequency (Lifetime)  NA     Duration (Lifetime)  NA     Controllability (Lifetime)  NA     Protective Factors  (Lifetime)  NA     Reasons for Ideation (Lifetime)  NA     Q1 Wished to be Dead (Past Month) yes yes no    Q2 Suicidal Thoughts (Past Month) yes yes no    Q3 Suicidal Thought Method yes yes no    Q4 Suicidal Intent without Specific Plan no yes no    Q5 Suicide Intent with Specific Plan yes yes no    Q6 Suicide Behavior (Lifetime) no yes no    Within the Past 3 Months?  yes     Level of Risk per Screen high risk high risk low risk    Suicidal/Self-Injurious Behavior (3 mo)  actual suicide attempt     Suicidal/Self-Injurious Behavior (Life)  actual suicide attempt     Suicidal Ideation(Most Severe Past Mnth)  suicidal intent (without specific plan)     Activating Events (Recent)  recent loss(es) or other  significant negative event(s) (legal, financial, relationship, etc.)     Describe (Recent Loss/Negative Event)  Change in fianances, stress     Treatment History  not receiving treatment     Other Risk Factors  NA     Clinical Status (Recent)  hopelessness;major depressive episode     Other Protective Factors  VEE     Describe Suicidal/Self-Inj/Aggress Behav  SA OD     Q1 Wish to be Dead (Lifetime)    Y   Wish to be Dead Description (Lifetime)    July 2022, she was in a long distance relationship and she felt like she didn't want to be here   1. Wish to be Dead (Past 1 Month)    N   Q2 Non-Specific Active Suicidal Thoughts (Lifetime)    Y   Non-Specific Active Suicidal Thought Description (Lifetime)    Thoughts of being better off dead   2. Non-Specific Active Suicidal Thoughts (Past 1 Month)    N   3. Active Suicidal Ideation with any Methods (Not Plan) Without Intent to Act (Lifetime)    Y   Active Suicidal Ideation with any Methods (Not Plan) Description (Lifetime)    Thoughts of overdosing   Q3 Active Suicidal Ideation with any Methods (Not Plan) Without Intent to Act (Past 1 Month)    N   Q4 Active Suicidal Ideation with Some Intent to Act, Without Specific Plan (Lifetime)    Y   Active Suicidal Ideation with Some Intent to Act, Without Specific Plan Description (Lifetime)    Thoughts of overdosing   4. Active Suicidal Ideation with Some Intent to Act, Without Specific Plan (Past 1 Month)    N   Q5 Active Suicidal Ideation with Specific Plan and Intent (Lifetime)    Y   Active Suicidal Ideation with Specific Plan and Intent Description (Lifetime)    Thoughts of overdosing   5. Active Suicidal Ideation with Specific Plan and Intent (Past 1 Month)    N   Most Severe Ideation Rating (Lifetime)    5   Description of Most Severe Ideation (Lifetime)    Plan to overdose on medication   Description of Most Severe Ideation (Past 1 Month)    N/A   Frequency (Lifetime)    3   Duration (Lifetime)    3   Controllability  (Lifetime)    2   Deterrents (Lifetime)    5   Reasons for Ideation (Lifetime)    5   Actual Attempt (Lifetime)    Y   Total Number of Actual Attempts (Lifetime)    1   Actual Attempt Description (Lifetime)    Overdosing on Tylenol   Actual Attempt (Past 3 Months)    N   Has subject engaged in non-suicidal self-injurious behavior? (Lifetime)    Y   Has subject engaged in non-suicidal self-injurious behavior? (Past 3 Months)    N   Interrupted Attempts (Lifetime)    N   Aborted or Self-Interrupted Attempt (Lifetime)    Y   Total Number of Aborted or Self-Interrupted Attempts (Lifetime)    1   Aborted or Self-Interrupted Attempt Description (Lifetime)    Overdosing on tylenol   Aborted or Self-Interrupted Attempt (Past 3 Months)    N   Preparatory Acts or Behavior (Lifetime)    N   Most Recent Attempt Date    7/31/2022   Actual Lethality/Medical Damage Code (Most Recent Attempt)    2   Potential Lethality Code (Most Recent Attempt)    0   Calculated C-SSRS Risk Score (Lifetime/Recent)    Moderate Risk         Appearance:   Appropriate   Eye Contact:   Good   Psychomotor Behavior: Normal   Attitude:   Cooperative   Orientation:   All  Speech   Rate / Production: Normal    Volume:  Normal   Mood:    Depressed   Affect:    Restricted   Thought Content:  Clear   Thought Form:  Coherent  Logical   Insight:    Good     Diagnoses:  1. Major depressive disorder, recurrent episode, moderate (H)      Collateral Reports Completed:  Not Applicable    Plan: (Homework, other):  Patient was given information about behavioral services and encouraged to schedule a follow up appointment with the clinic [facility] in 2 weeks. [facility] provided information about mental health symptoms and treatment options .  She was also given CD Recommendations: No indications of CD issues.      CEE Alaniz, [facility]  Behavioral Health Clinician  [facility]  "Clinics      ______________________________________________________________________    Integrated Primary Care Behavioral Health Treatment Plan    Patient's Name: Nelsy Quinones  YOB: 2000    Date of Creation: December 18, 2023  Date Treatment Plan Last Reviewed/Revised: N/A    DSM5 Diagnoses: 296.32 (F33.1) Major Depressive Disorder, Recurrent Episode, Moderate _  Psychosocial / Contextual Factors: Pt recently gave birth to her second child, she is the mother of 2 sons. She is  since July 2023 to her son's father.  PROMIS (reviewed every 90 days):       Referral / Collaboration:  The following referral(s) will be initiated: Long-term therapist .    Anticipated number of session for this episode of care: 8  Anticipation frequency of session: Every other week  Anticipated Duration of each session: 38-52 minutes  Treatment plan will be reviewed in 90 days or when goals have been changed.       MeasurableTreatment Goal(s) related to diagnosis / functional impairment(s)  Goal :  To learn coping skills to \"look at the glass half full\", find forgiveness of herself, navigate life with 2 kids and a  and \"get out of the dark place\".   -Reduce symptoms of depression and suicidal thinking and increase life functioning  -effectively reduce depressive symptoms as evidenced by a reduced PHQ9 score of 5 or less with occurrence of several days or less.      Objective #A:  Nelsy will experience a reduction in depressed mood, will develop more effective coping skills to manage depressive symptoms and will develop healthy cognitive patterns and beliefs   Client will Increase interest, engagement, and pleasure in doing things  Decrease frequency and intensity of feeling down, depressed, hopeless  Identify negative self-talk and behaviors: challenge core beliefs, myths, and actions  Decrease thoughts that you'd be better off dead or of suicide / self-harm.    Status: New - Date:    December 18, " 2023      Objective #B: Nelsy will increase ability to function adaptively and will continue to take medications as prescribed / participate in supportive activities and services   Client will Increase interest, engagement, and pleasure in doing things  Improve quantity and quality of night time sleep / decrease daytime naps  Feel less tired and more energy during the day    Improve diet, appetite, mindful eating, and / or meal planning  Identify negative self-talk and behaviors: challenge core beliefs, myths, and actions  Improve concentration, focus, and mindfulness in daily activities .    Status: New - Date:    December 18, 2023      Objective #C: Nelsy will address relationship difficulties in a more adaptive manner  Client will examine relationship hx and learn skills to more effectively communicate and be assertive.   Status: New - Date:    December 18, 2023      Intervention(s)  Psycho-education regarding mental health diagnoses and treatment options    Skills training  Explore skills useful to client in current situation  Skills include assertiveness, communication, conflict management, problem-solving, relaxation, etc.    Solution-Focused Therapy  Explore patterns in patient's relationships and discussed options for new behaviors  Explore patterns in patient's actions and choices and discussed options for new behaviors    Cognitive-behavioral Therapy  Discuss common cognitive distortions, identified them in patient's life  Explore ways to challenge, replace, and act against these cognitions    Acceptance and Commitment Therapy  Explore and identified important values in patient's life  Discuss ways to commit to behavioral activation around these values    Psychodynamic psychotherapy  Discuss patient's emotional dynamics and issues and how they impact behaviors  Explore patient's history of relationships and how they impact present behaviors  Explore how to work with and make changes in these schemas  and patterns    Behavioral Activation  Discuss steps patient can take to become more involved in meaningful activity  Identify barriers to these activities and explored possible solutions    Mindfulness-Based Strategies  Discuss skills based on development and application of mindfulness  Skills drawn from dialectical behavior therapy, mindfulness-based stress reduction, mindfulness-based cognitive therapy, etc.        Patient has reviewed and agreed to the above plan.      Neisha Asif, [facility]  December 18, 2023

## 2024-01-04 ENCOUNTER — MYC MEDICAL ADVICE (OUTPATIENT)
Dept: BEHAVIORAL HEALTH | Facility: CLINIC | Age: 24
End: 2024-01-04

## 2024-01-30 ENCOUNTER — THERAPY VISIT (OUTPATIENT)
Dept: PHYSICAL THERAPY | Facility: CLINIC | Age: 24
End: 2024-01-30
Attending: OBSTETRICS & GYNECOLOGY
Payer: COMMERCIAL

## 2024-01-30 DIAGNOSIS — R19.8 INCREASED ABDOMINAL GIRTH: Primary | ICD-10-CM

## 2024-01-30 PROCEDURE — 97161 PT EVAL LOW COMPLEX 20 MIN: CPT | Mod: GP

## 2024-01-30 PROCEDURE — 97112 NEUROMUSCULAR REEDUCATION: CPT | Mod: GP

## 2024-01-30 NOTE — PROGRESS NOTES
PHYSICAL THERAPY EVALUATION  Type of Visit: Evaluation    See electronic medical record for Abuse and Falls Screening details.    Subjective       Presenting condition or subjective complaint:  Ab separation after birth  Date of onset: 12/12/23    Relevant medical history:  Anemia, seizures   Dates & types of surgery:  N/A    Prior diagnostic imaging/testing results:       Prior therapy history for the same diagnosis, illness or injury:   No     Living Environment  Social support:   Spouse  Type of home:   Apartment/condo  Stairs to enter the home: Yes, with railing        Ramp:   No  Stairs inside the home: No         Help at home:  None  Equipment owned:   None    Employment:    [employer]  Hobbies/Interests:  [employer] and singing    Patient goals for therapy: Fit in my jeans        Objective      PELVIC EVALUATION  ADDITIONAL HISTORY:  Sex assigned at birth:    Gender identity:      Pronouns:        Bladder History:  Feels bladder filling:    Triggers for feeling of inability to wait to go to the bathroom:      How long can you wait to urinate:    Gets up at night to urinate:      Can stop the flow of urine when urinating:    Volume of urine usually released:     Other issues:    Number of bladder infections in last 12 months:    Fluid intake per day:        Medications taken for bladder:       Activities causing urine leak:      Amount of urine typically leaked:    Pads used to help with leaking:          Bowel History:  Frequency of bowel movement:    Consistency of stool:      Ignores the urge to defecate:    Other bowel issues:    Length of time spent trying to have a bowel movement:      Sexual Function History:  Sexual orientation:      Sexually active:    Lubrication used:      Pelvic pain:      Pain or difficulty with orgasms/erection/ejaculation:      State of menopause:    Hormone medications:             Discussed reason for referral regarding pelvic health needs and external/internal pelvic floor muscle  examination with patient/guardian.  Opportunity provided to ask questions and verbal consent for assessment and intervention was given.    POSTURE: Standing Posture: Rounded shoulders, Forward head, Lordosis increased, Thoracic kyphosis increased  Sitting Posture: Rounded shoulders, Forward head, Lordosis decreased, Thoracic kyphosis increased    ABDOMINAL ASSESSMENT  Abdominal Activation/Strength:  Doming/coning w/ crunch. 2.5 cm supra and sub-umbilical LYNN; 3 cm LYNN at umbilicus w/ accompanying bogginess    Fascial Tension/Restriction: WNL    Assessment & Plan   CLINICAL IMPRESSIONS  Medical Diagnosis: Increased Abdominal Girth    Treatment Diagnosis: Abdominal muscle incoordination   Impression/Assessment: Patient is a 23 year old female with abdominal incoordination complaints.  The following significant findings have been identified: Decreased ROM/flexibility, Decreased joint mobility, Decreased strength, Impaired muscle performance, Decreased activity tolerance, and Impaired posture. These impairments interfere with their ability to perform self care tasks, work tasks, recreational activities, and household chores as compared to previous level of function.     Clinical Decision Making (Complexity):  Clinical Presentation: Stable/Uncomplicated  Clinical Presentation Rationale: based on medical and personal factors listed in PT evaluation  Clinical Decision Making (Complexity): Low complexity    PLAN OF CARE  Treatment Interventions:  Interventions: Manual Therapy, Neuromuscular Re-education, Therapeutic Activity, Therapeutic Exercise, Self-Care/Home Management    Long Term Goals     PT Goal 1  Goal Identifier: Goal 1  Goal Description: The patient will be able to perform dynamic abdominal movements, like a crunch, without doming or coning.  Rationale: to maximize safety and independence with performance of ADLs and functional tasks;to maximize safety and independence within the home;to maximize safety and  independence within the community;to maximize safety and independence with transportation;to maximize safety and independence with self cares  Target Date: 04/23/24      Frequency of Treatment: 1 x per week  Duration of Treatment: 12 weeks    Education Assessment:   Learner/Method: Patient;Pictures/Video;No Barriers to Learning;Demonstration;Listening;Reading    Risks and benefits of evaluation/treatment have been explained.   Patient/Family/caregiver agrees with Plan of Care.     Evaluation Time:     PT Eval, Low Complexity Minutes (00685): 5       Signing Clinician: [facility]                                                                                   OUTPATIENT PHYSICAL THERAPY      PLAN OF TREATMENT FOR OUTPATIENT REHABILITATION   Patient's Last Name, First Name, Nelsy Pineda YOB: 2000   Provider's Name   [facility]   Medical Record No.  [id number]     Onset Date: 12/12/23  Start of Care Date: 01/30/24     Medical Diagnosis:  Increased Abdominal Girth      PT Treatment Diagnosis:  Abdominal muscle incoordination Plan of Treatment  Frequency/Duration: 1 x per week/ 12 weeks    Certification date from 01/30/24 to 04/23/24         See note for plan of treatment details and functional goals     Lorelei Jean Baptiste PT                         I CERTIFY THE NEED FOR THESE SERVICES FURNISHED UNDER        THIS PLAN OF TREATMENT AND WHILE UNDER MY CARE     (Physician attestation of this document indicates review and certification of the therapy plan).              Referring Provider:  Willy Vogel    Initial Assessment  See Epic Evaluation- Start of Care Date: 01/30/24

## 2024-02-27 ENCOUNTER — OFFICE VISIT (OUTPATIENT)
Dept: OBGYN | Facility: CLINIC | Age: 24
End: 2024-02-27
Payer: COMMERCIAL

## 2024-02-27 VITALS — WEIGHT: 188 LBS | DIASTOLIC BLOOD PRESSURE: 62 MMHG | SYSTOLIC BLOOD PRESSURE: 112 MMHG | BODY MASS INDEX: 32.27 KG/M2

## 2024-02-27 DIAGNOSIS — N92.1 MENORRHAGIA WITH IRREGULAR CYCLE: Primary | ICD-10-CM

## 2024-02-27 LAB
HCG UR QL: NEGATIVE
INTERNAL QC OK POCT: NORMAL
POCT KIT EXPIRATION DATE: NORMAL
POCT KIT LOT NUMBER: NORMAL

## 2024-02-27 PROCEDURE — 99213 OFFICE O/P EST LOW 20 MIN: CPT | Performed by: OBSTETRICS & GYNECOLOGY

## 2024-02-27 PROCEDURE — 81025 URINE PREGNANCY TEST: CPT | Performed by: OBSTETRICS & GYNECOLOGY

## 2024-02-27 RX ORDER — CHLORHEXIDINE GLUCONATE ORAL RINSE 1.2 MG/ML
15 SOLUTION DENTAL 2 TIMES DAILY
COMMUNITY
Start: 2024-01-19 | End: 2024-05-03

## 2024-02-27 NOTE — PROGRESS NOTES
SUBJECTIVE:            I spent a total of 20 minutes on care Nelsy the date of service including 15 minutes of face-to-face time with remainder in chart review, care coordination, documentation on the date of service.                                           Nelsy Quinones is a 23 year old female who presents to clinic today for the following health issue(s):  No chief complaint on file.    Patient is a 23-year-old  who presents for discussion regarding irregular periods.  She is delivered her last baby in 2023.  Since then she has had a couple cycles 1 was in January and the end of December that lasted longer than what she thought and she has not had a cycle since.  She is wondering if she is pregnant and she did check a pregnancy test and then had 1 check here to and those were both negative.    Patient is not breast-feeding.  She does have some symptoms of nausea occasionally did suggest that she might be pregnant.    We discussed various options for her whether she would like to have periods or not or be regular or birth control.  Suggest that she could possibly start on a 10-day supply of progesterone which might jumpstart her periods or she can wait.  Additionally suggest that if at some point in the near future she wants to have lab test done I would order TSH and a prolactin that she can get done at any time.    At this point she would like to just wait and see how her cycles go.    Assessment/plan is 23-year-old status post 5 months from having her second baby with irregular periods.  At this point we will order a TSH and a prolactin however she is not breast-feeding and does not feel the timing at this point would be necessary to get lab test or to start on any medications.  She knows that she is not using birth control and getting pregnant is still potential reality.    No LMP recorded.. 23 for 9 days.  Has not had one since.    Patient is sexually active, .  Using  none for contraception.    reports that she has never smoked. She has never used smokeless tobacco.    STD testing offered?  Declined    Health maintenance updated:  no    Today's PHQ-2 Score:       12/12/2023     1:03 PM   PHQ-2 ( 1999 [employer])   Q1: Little interest or pleasure in doing things 2   Q2: Feeling down, depressed or hopeless 2   PHQ-2 Score 4     Today's PHQ-9 Score:       12/12/2023     1:03 PM   PHQ-9 SCORE   PHQ-9 Total Score 14     Today's STACI-7 Score:        No data to display                Problem list and histories reviewed & adjusted, as indicated.  Additional history: as documented.    Patient Active Problem List   Diagnosis    Suicide attempt (H)    Encounter for triage in pregnant patient    Labor and delivery, indication for care    Anemia    Edema in pregnancy in third trimester    Episodic mood disorder (H24)    Recurrent major depressive disorder, in partial remission (H24)    Seizure disorder (H)    Spells of trembling    Increased abdominal girth     Past Surgical History:   Procedure Laterality Date    NO PAST SURGERIES        Social History     Tobacco Use    Smoking status: Never    Smokeless tobacco: Never   Substance Use Topics    Alcohol use: Not Currently      No data available              Current Outpatient Medications   Medication Sig    docusate sodium (COLACE) 100 MG capsule Take 1 capsule (100 mg) by mouth 2 times daily as needed for constipation    docusate sodium (COLACE) 100 MG capsule Take 1 capsule (100 mg) by mouth 2 times daily as needed for constipation    ferrous gluconate (FERGON) 324 (38 Fe) MG tablet Take 1 tablet (324 mg) by mouth daily (with breakfast)    ferrous gluconate (FERGON) 324 (38 Fe) MG tablet Take 1 tablet (324 mg) by mouth daily (with breakfast)    ibuprofen (ADVIL/MOTRIN) 600 MG tablet Take 1 tablet (600 mg) by mouth every 6 hours as needed for moderate pain    ibuprofen (ADVIL/MOTRIN) 600 MG tablet Take 1 tablet (600 mg) by mouth every 6 hours as  needed for moderate pain    levETIRAcetam (KEPPRA) 500 MG tablet Take 1,000 mg by mouth 2 times daily    Misc. Devices (BREAST PUMP) MISC 1 each as needed (breast feeding)    Misc. Devices (BREAST PUMP) MISC 1 each as needed (breast feeding)     No current facility-administered medications for this visit.     No Known Allergies          OBJECTIVE:     There were no vitals taken for this visit.  There is no height or weight on file to calculate BMI.    Exam:  Deferred    In-Clinic Test Results:  No results found for this or any previous visit (from the past 24 hour(s)).    ASSESSMENT/PLAN:                                                      As above    Willy Vogel MD  [facility]

## 2024-03-20 ENCOUNTER — VIRTUAL VISIT (OUTPATIENT)
Dept: URGENT CARE | Facility: CLINIC | Age: 24
End: 2024-03-20
Payer: COMMERCIAL

## 2024-03-20 DIAGNOSIS — N92.6 IRREGULAR MENSTRUAL CYCLE: Primary | ICD-10-CM

## 2024-03-20 PROCEDURE — 99207 PR NON-BILLABLE SERV PER CHARTING: CPT | Performed by: EMERGENCY MEDICINE

## 2024-04-10 NOTE — PROGRESS NOTES
01/30/24 0500   Appointment Info   Signing clinician's name / credentials Lorelei Jean Baptiste, PT, DPT   Total/Authorized Visits Eval and Treat   Visits Used 1   Medical Diagnosis Increased Abdominal Girth   PT Tx Diagnosis Abdominal muscle incoordination   Other pertinent information Postpartum, September 2023   Quick Adds Certification;Pelvic Consent   Progress Note/Certification   Start of Care Date 01/30/24   Onset of illness/injury or Date of Surgery 12/12/23   Therapy Frequency 1 x per week   Predicted Duration 12 weeks   Certification date from 01/30/24   Certification date to 04/23/24   Progress Note Due Date 04/23/24   Progress Note Completed Date 01/30/24   PT Goal 1   Goal Identifier Goal 1   Goal Description The patient will be able to perform dynamic abdominal movements, like a crunch, without doming or coning.   Rationale to maximize safety and independence with performance of ADLs and functional tasks;to maximize safety and independence within the home;to maximize safety and independence within the community;to maximize safety and independence with transportation;to maximize safety and independence with self cares   Target Date 04/23/24   Subjective Report   Subjective Report See eval   Treatment Interventions (PT)   Interventions Therapeutic Procedure/Exercise;Therapeutic Activity;Neuromuscular Re-education;Manual Therapy;Self Care/Home Management   Therapeutic Activity   PTRx Ther Act 1 Diastasis Recti Evaluation   PTRx Ther Act 1 - Details No Notes   Neuromuscular Re-education   Neuromuscular re-ed of mvmt, balance, coord, kinesthetic sense, posture, proprioception minutes (14423) 24   Skilled Intervention Verbal and tactile cueing for muscle recruitment and coordination   PTRx Neuro Re-ed 1 Abdominal Brace Transverse Abdominis   PTRx Neuro Re-ed 1 - Details 1 x 20 daily   PTRx Neuro Re-ed 2 Sidestep   PTRx Neuro Re-ed 2 - Details using theraband for antirotational core stability, 1 min per side   PTRx  Neuro Re-ed 3 Diaphragmatic Breathing   PTRx Neuro Re-ed 3 - Details w/ manual mobilizations through the lateral ribs to improve rib cage mobility and abdominal coordination   Eval/Assessments   PT Eval, Low Complexity Minutes (82173) 5   Education   Learner/Method Patient;Pictures/Video;No Barriers to Learning;Demonstration;Listening;Reading   Plan   Home program See PTRX   Plan for next session Reassess and progress as tolerated; constipation management, discuss pelvic floor and abdominal connection   Total Session Time   Timed Code Treatment Minutes 24   Total Treatment Time (sum of timed and untimed services) 29         DISCHARGE  Reason for Discharge: Patient has failed to schedule further appointments.    Equipment Issued: na    Discharge Plan: Patient to continue home program.    Referring Provider:  Willy Vogel

## 2024-05-03 ENCOUNTER — OFFICE VISIT (OUTPATIENT)
Dept: OBGYN | Facility: CLINIC | Age: 24
End: 2024-05-03
Payer: COMMERCIAL

## 2024-05-03 VITALS
SYSTOLIC BLOOD PRESSURE: 102 MMHG | DIASTOLIC BLOOD PRESSURE: 68 MMHG | WEIGHT: 191 LBS | BODY MASS INDEX: 32.61 KG/M2 | HEIGHT: 64 IN

## 2024-05-03 DIAGNOSIS — N92.6 IRREGULAR BLEEDING: Primary | ICD-10-CM

## 2024-05-03 PROBLEM — F39 MOOD DISORDER (H): Status: ACTIVE | Noted: 2021-12-28

## 2024-05-03 LAB — HCG UR QL: NEGATIVE

## 2024-05-03 PROCEDURE — 84146 ASSAY OF PROLACTIN: CPT | Performed by: OBSTETRICS & GYNECOLOGY

## 2024-05-03 PROCEDURE — 36415 COLL VENOUS BLD VENIPUNCTURE: CPT | Performed by: OBSTETRICS & GYNECOLOGY

## 2024-05-03 PROCEDURE — 84443 ASSAY THYROID STIM HORMONE: CPT | Performed by: OBSTETRICS & GYNECOLOGY

## 2024-05-03 PROCEDURE — 81025 URINE PREGNANCY TEST: CPT | Performed by: OBSTETRICS & GYNECOLOGY

## 2024-05-03 PROCEDURE — 99214 OFFICE O/P EST MOD 30 MIN: CPT | Performed by: OBSTETRICS & GYNECOLOGY

## 2024-05-03 RX ORDER — MEDROXYPROGESTERONE ACETATE 10 MG
10 TABLET ORAL DAILY
Qty: 10 TABLET | Refills: 0 | Status: SHIPPED | OUTPATIENT
Start: 2024-05-03 | End: 2024-05-13

## 2024-05-03 NOTE — PROGRESS NOTES
"  Assessment & Plan     Irregular bleeding  - Need to confirm she is not pregnant, as well as r/o hypothyroidism and hyperprolactinemia  - If all neg, will likely attribute this to anovulatory cycles and have Pt do Provera w/d.  - Rx sent to pharm medroxyPROGESTERone (PROVERA) 10 MG tablet; Take 1 tablet (10 mg) by mouth daily for 10 days  - HCG qualitative urine; Future  - Prolactin; Future  - TSH with free T4 reflex; Future  - Pt to keep menstrual calendar. If cycles still irreg, consider pelvic U/S, RTC for GC/Chlam and exam, and if all neg, consider hormonal contraception or Mirena IUD            BMI  Estimated body mass index is 32.79 kg/m  as calculated from the following:    Height as of this encounter: 1.626 m (5' 4\").    Weight as of this encounter: 86.6 kg (191 lb).             No follow-ups on file.    Cailin Whittington is a 23 year old, presenting for the following health issues:  Abnormal Bleeding Problem (Irregular periods )    Pt here for irregular VB. Pt is s/p  term baby 2023 (7 mos ago) and  for 2 1/2 months then stopped at end of 2023. She had her first normal menses 2023, but then had no VB 2024. She saw Dr. Willy Vogel 2024 and had neg UPT and was offered Provera w/d but elected to not take it. She then had light VB 2024 (the day after that visit) which occurred daily until 3/21/2024 when she had a bit more VB that seemed like a light menses that ended 3/29/2024. She then had no VB until last week when she again started light VB 2024 and has had persistent light VB since.                       Objective    /68 (BP Location: Right arm, Patient Position: Sitting, Cuff Size: Adult Regular)   Ht 1.626 m (5' 4\")   Wt 86.6 kg (191 lb)   LMP 2024   BMI 32.79 kg/m    Body mass index is 32.79 kg/m .  Physical Exam  Constitutional:       General: She is not in acute distress.     Appearance: Normal appearance. She is not ill-appearing. "   Neurological:      Mental Status: She is alert.                    Signed Electronically by: Abhijit Cassidy MD

## 2024-05-03 NOTE — NURSING NOTE
"Chief Complaint   Patient presents with    Abnormal Bleeding Problem     Irregular periods        Initial /68 (BP Location: Right arm, Patient Position: Sitting, Cuff Size: Adult Regular)   Ht 1.626 m (5' 4\")   Wt 86.6 kg (191 lb)   LMP 2024   BMI 32.79 kg/m   Estimated body mass index is 32.79 kg/m  as calculated from the following:    Height as of this encounter: 1.626 m (5' 4\").    Weight as of this encounter: 86.6 kg (191 lb).  BP completed using cuff size: regular    Questioned patient about current smoking habits.  Pt. has never smoked.          The following HM Due: NONE    Seamus Gutierrez CMA              "

## 2024-05-04 LAB
PROLACTIN SERPL 3RD IS-MCNC: 10 NG/ML (ref 5–23)
TSH SERPL DL<=0.005 MIU/L-ACNC: 2.62 UIU/ML (ref 0.3–4.2)

## 2024-05-06 ENCOUNTER — MEDICAL CORRESPONDENCE (OUTPATIENT)
Dept: HEALTH INFORMATION MANAGEMENT | Facility: CLINIC | Age: 24
End: 2024-05-06
Payer: COMMERCIAL

## 2024-06-21 ENCOUNTER — APPOINTMENT (OUTPATIENT)
Dept: GENERAL RADIOLOGY | Facility: CLINIC | Age: 24
End: 2024-06-21
Attending: EMERGENCY MEDICINE
Payer: COMMERCIAL

## 2024-06-21 ENCOUNTER — HOSPITAL ENCOUNTER (EMERGENCY)
Facility: CLINIC | Age: 24
Discharge: HOME | End: 2024-06-21
Attending: EMERGENCY MEDICINE | Admitting: EMERGENCY MEDICINE
Payer: COMMERCIAL

## 2024-06-21 ENCOUNTER — APPOINTMENT (OUTPATIENT)
Dept: ULTRASOUND IMAGING | Facility: CLINIC | Age: 24
End: 2024-06-21
Attending: EMERGENCY MEDICINE
Payer: COMMERCIAL

## 2024-06-21 VITALS
DIASTOLIC BLOOD PRESSURE: 75 MMHG | OXYGEN SATURATION: 99 % | TEMPERATURE: 97.8 F | HEART RATE: 75 BPM | SYSTOLIC BLOOD PRESSURE: 108 MMHG | RESPIRATION RATE: 14 BRPM

## 2024-06-21 DIAGNOSIS — R07.9 CHEST PAIN, UNSPECIFIED TYPE: ICD-10-CM

## 2024-06-21 LAB
ALBUMIN SERPL BCG-MCNC: 4 G/DL (ref 3.5–5.2)
ALP SERPL-CCNC: 60 U/L (ref 40–150)
ALT SERPL W P-5'-P-CCNC: 18 U/L (ref 0–50)
ANION GAP SERPL CALCULATED.3IONS-SCNC: 9 MMOL/L (ref 7–15)
AST SERPL W P-5'-P-CCNC: 19 U/L (ref 0–45)
ATRIAL RATE - MUSE: 73 BPM
BASOPHILS # BLD AUTO: 0 10E3/UL (ref 0–0.2)
BASOPHILS NFR BLD AUTO: 1 %
BILIRUB DIRECT SERPL-MCNC: <0.2 MG/DL (ref 0–0.3)
BILIRUB SERPL-MCNC: <0.2 MG/DL
BUN SERPL-MCNC: 11.7 MG/DL (ref 6–20)
CALCIUM SERPL-MCNC: 9.6 MG/DL (ref 8.6–10)
CHLORIDE SERPL-SCNC: 104 MMOL/L (ref 98–107)
CREAT SERPL-MCNC: 0.83 MG/DL (ref 0.51–0.95)
D DIMER PPP FEU-MCNC: 0.35 UG/ML FEU (ref 0–0.5)
DEPRECATED HCO3 PLAS-SCNC: 28 MMOL/L (ref 22–29)
DIASTOLIC BLOOD PRESSURE - MUSE: NORMAL MMHG
EGFRCR SERPLBLD CKD-EPI 2021: >90 ML/MIN/1.73M2
EOSINOPHIL # BLD AUTO: 0.1 10E3/UL (ref 0–0.7)
EOSINOPHIL NFR BLD AUTO: 2 %
ERYTHROCYTE [DISTWIDTH] IN BLOOD BY AUTOMATED COUNT: 13.2 % (ref 10–15)
GLUCOSE SERPL-MCNC: 86 MG/DL (ref 70–99)
HCG SERPL QL: NEGATIVE
HCT VFR BLD AUTO: 38.2 % (ref 35–47)
HGB BLD-MCNC: 12.3 G/DL (ref 11.7–15.7)
HOLD SPECIMEN: NORMAL
IMM GRANULOCYTES # BLD: 0 10E3/UL
IMM GRANULOCYTES NFR BLD: 0 %
INTERPRETATION ECG - MUSE: NORMAL
LIPASE SERPL-CCNC: 36 U/L (ref 13–60)
LYMPHOCYTES # BLD AUTO: 2.1 10E3/UL (ref 0.8–5.3)
LYMPHOCYTES NFR BLD AUTO: 32 %
MCH RBC QN AUTO: 28.1 PG (ref 26.5–33)
MCHC RBC AUTO-ENTMCNC: 32.2 G/DL (ref 31.5–36.5)
MCV RBC AUTO: 87 FL (ref 78–100)
MONOCYTES # BLD AUTO: 0.7 10E3/UL (ref 0–1.3)
MONOCYTES NFR BLD AUTO: 11 %
NEUTROPHILS # BLD AUTO: 3.5 10E3/UL (ref 1.6–8.3)
NEUTROPHILS NFR BLD AUTO: 54 %
NRBC # BLD AUTO: 0 10E3/UL
NRBC BLD AUTO-RTO: 0 /100
P AXIS - MUSE: 46 DEGREES
PLATELET # BLD AUTO: 226 10E3/UL (ref 150–450)
POTASSIUM SERPL-SCNC: 3.9 MMOL/L (ref 3.4–5.3)
PR INTERVAL - MUSE: 148 MS
PROT SERPL-MCNC: 6.9 G/DL (ref 6.4–8.3)
QRS DURATION - MUSE: 82 MS
QT - MUSE: 368 MS
QTC - MUSE: 405 MS
R AXIS - MUSE: 40 DEGREES
RBC # BLD AUTO: 4.37 10E6/UL (ref 3.8–5.2)
SODIUM SERPL-SCNC: 141 MMOL/L (ref 135–145)
SYSTOLIC BLOOD PRESSURE - MUSE: NORMAL MMHG
T AXIS - MUSE: 20 DEGREES
TROPONIN T SERPL HS-MCNC: <6 NG/L
TROPONIN T SERPL HS-MCNC: <6 NG/L
VENTRICULAR RATE- MUSE: 73 BPM
WBC # BLD AUTO: 6.4 10E3/UL (ref 4–11)

## 2024-06-21 PROCEDURE — 99285 EMERGENCY DEPT VISIT HI MDM: CPT | Mod: 25

## 2024-06-21 PROCEDURE — 84484 ASSAY OF TROPONIN QUANT: CPT | Performed by: EMERGENCY MEDICINE

## 2024-06-21 PROCEDURE — 84703 CHORIONIC GONADOTROPIN ASSAY: CPT | Performed by: EMERGENCY MEDICINE

## 2024-06-21 PROCEDURE — 85379 FIBRIN DEGRADATION QUANT: CPT | Performed by: EMERGENCY MEDICINE

## 2024-06-21 PROCEDURE — 36415 COLL VENOUS BLD VENIPUNCTURE: CPT | Performed by: EMERGENCY MEDICINE

## 2024-06-21 PROCEDURE — 85025 COMPLETE CBC W/AUTO DIFF WBC: CPT | Performed by: EMERGENCY MEDICINE

## 2024-06-21 PROCEDURE — 82248 BILIRUBIN DIRECT: CPT | Performed by: EMERGENCY MEDICINE

## 2024-06-21 PROCEDURE — 80048 BASIC METABOLIC PNL TOTAL CA: CPT | Performed by: EMERGENCY MEDICINE

## 2024-06-21 PROCEDURE — 96360 HYDRATION IV INFUSION INIT: CPT

## 2024-06-21 PROCEDURE — 83690 ASSAY OF LIPASE: CPT | Performed by: EMERGENCY MEDICINE

## 2024-06-21 PROCEDURE — 93005 ELECTROCARDIOGRAM TRACING: CPT

## 2024-06-21 PROCEDURE — 76705 ECHO EXAM OF ABDOMEN: CPT

## 2024-06-21 PROCEDURE — 71046 X-RAY EXAM CHEST 2 VIEWS: CPT

## 2024-06-21 PROCEDURE — 258N000003 HC RX IP 258 OP 636: Mod: JZ | Performed by: EMERGENCY MEDICINE

## 2024-06-21 PROCEDURE — 250N000013 HC RX MED GY IP 250 OP 250 PS 637: Performed by: EMERGENCY MEDICINE

## 2024-06-21 RX ORDER — ACETAMINOPHEN 500 MG
1000 TABLET ORAL ONCE
Status: COMPLETED | OUTPATIENT
Start: 2024-06-21 | End: 2024-06-21

## 2024-06-21 RX ORDER — MAGNESIUM HYDROXIDE/ALUMINUM HYDROXICE/SIMETHICONE 120; 1200; 1200 MG/30ML; MG/30ML; MG/30ML
15 SUSPENSION ORAL ONCE
Status: COMPLETED | OUTPATIENT
Start: 2024-06-21 | End: 2024-06-21

## 2024-06-21 RX ORDER — FAMOTIDINE 20 MG/1
20 TABLET, FILM COATED ORAL ONCE
Status: COMPLETED | OUTPATIENT
Start: 2024-06-21 | End: 2024-06-21

## 2024-06-21 RX ADMIN — SODIUM CHLORIDE 1000 ML: 9 INJECTION, SOLUTION INTRAVENOUS at 19:55

## 2024-06-21 RX ADMIN — ACETAMINOPHEN 1000 MG: 500 TABLET, FILM COATED ORAL at 21:13

## 2024-06-21 ASSESSMENT — ACTIVITIES OF DAILY LIVING (ADL)
ADLS_ACUITY_SCORE: 35

## 2024-06-21 ASSESSMENT — COLUMBIA-SUICIDE SEVERITY RATING SCALE - C-SSRS
2. HAVE YOU ACTUALLY HAD ANY THOUGHTS OF KILLING YOURSELF IN THE PAST MONTH?: NO
1. IN THE PAST MONTH, HAVE YOU WISHED YOU WERE DEAD OR WISHED YOU COULD GO TO SLEEP AND NOT WAKE UP?: NO
6. HAVE YOU EVER DONE ANYTHING, STARTED TO DO ANYTHING, OR PREPARED TO DO ANYTHING TO END YOUR LIFE?: NO

## 2024-06-21 NOTE — ED TRIAGE NOTES
Pt reports intermittent 6/10 chest pain that started at 1745.  Pt states she may be pregnant.     Triage Assessment (Adult)       Row Name 06/21/24 4738          Triage Assessment    Airway WDL WDL        Respiratory WDL    Respiratory WDL WDL        Skin Circulation/Temperature WDL    Skin Circulation/Temperature WDL WDL        Cardiac WDL    Cardiac WDL X  see note        Peripheral/Neurovascular WDL    Peripheral Neurovascular WDL WDL        Cognitive/Neuro/Behavioral WDL    Cognitive/Neuro/Behavioral WDL WDL

## 2024-06-22 NOTE — ED PROVIDER NOTES
"  Emergency Department Note      History of Present Illness     Chief Complaint  Chest Pain    HPI  Nelsy Quinones is a 23 year old female with history of seizures who presents to the ED for evaluation of chest pain. She reports having chest pain since 1600 along with symptoms of shortness of breath, dizziness, headache and blurry vision. Denies double vision, fever or cough. She states having a possible concern for pregnancy. Patient has irregular periods with the last cycle lasting a month long and ending two weeks ago. No history of blood clots. Patient mentions having a \"small seizure\" of shakes before she has a seizure normally.     Independent Historian  None    Past Medical History   Medical History and Problem List  Anemia  Seizure disorder  Suicide attempt  Mood disorder     Medications  Keppra   Provera  Physical Exam   Patient Vitals for the past 24 hrs:   BP Temp Temp src Pulse Resp SpO2   06/21/24 2307 108/75 -- -- 75 -- 99 %   06/21/24 2230 106/69 -- -- 79 -- 99 %   06/21/24 2130 123/77 -- -- 69 14 99 %   06/21/24 2045 121/82 -- -- 67 -- 100 %   06/21/24 1951 -- -- -- -- 29 99 %   06/21/24 1948 121/71 -- -- 81 -- --   06/21/24 1846 121/78 97.8  F (36.6  C) Temporal 83 18 100 %     Physical Exam  VS: Reviewed per above  HENT: Mucous membranes moist, no nuchal rigidity  EYES: sclera anicteric  CV: Rate as noted,  regular rhythm.   RESP: Effort normal. Breath sounds are normal bilaterally.  GI: no tenderness/rebound/guarding, not distended.  NEURO: GCS 15, cranial nerves II through XII are intact, 5 out of 5 strength in all 4 extremities, sensation is intact light touch in all 4 extremities  MSK: No deformity of the extremities  SKIN: Warm and dry      Diagnostics   Lab Results   Labs Ordered and Resulted from Time of ED Arrival to Time of ED Departure   BASIC METABOLIC PANEL - Normal       Result Value    Sodium 141      Potassium 3.9      Chloride 104      Carbon Dioxide (CO2) 28      Anion Gap 9   "    Urea Nitrogen 11.7      Creatinine 0.83      GFR Estimate >90      Calcium 9.6      Glucose 86     TROPONIN T, HIGH SENSITIVITY - Normal    Troponin T, High Sensitivity <6     HCG QUALITATIVE PREGNANCY - Normal    hCG Serum Qualitative Negative     D DIMER QUANTITATIVE - Normal    D-Dimer Quantitative 0.35     TROPONIN T, HIGH SENSITIVITY - Normal    Troponin T, High Sensitivity <6     LIPASE - Normal    Lipase 36     HEPATIC FUNCTION PANEL - Normal    Protein Total 6.9      Albumin 4.0      Bilirubin Total <0.2      Alkaline Phosphatase 60      AST 19      ALT 18      Bilirubin Direct <0.20     CBC WITH PLATELETS AND DIFFERENTIAL    WBC Count 6.4      RBC Count 4.37      Hemoglobin 12.3      Hematocrit 38.2      MCV 87      MCH 28.1      MCHC 32.2      RDW 13.2      Platelet Count 226      % Neutrophils 54      % Lymphocytes 32      % Monocytes 11      % Eosinophils 2      % Basophils 1      % Immature Granulocytes 0      NRBCs per 100 WBC 0      Absolute Neutrophils 3.5      Absolute Lymphocytes 2.1      Absolute Monocytes 0.7      Absolute Eosinophils 0.1      Absolute Basophils 0.0      Absolute Immature Granulocytes 0.0      Absolute NRBCs 0.0       Imaging  US Abdomen Limited   Final Result   IMPRESSION:   1.  No sonographic findings to explain patient's symptoms of epigastric pain.      XR Chest 2 Views   Final Result   IMPRESSION: Heart size and pulmonary vascularity normal. The lungs are clear.        Report per radiology    EKG   ECG results from 06/21/24   EKG 12 lead     Value    Systolic Blood Pressure     Diastolic Blood Pressure     Ventricular Rate 73    Atrial Rate 73    AR Interval 148    QRS Duration 82        QTc 405    P Axis 46    R AXIS 40    T Axis 20    Interpretation ECG      Sinus rhythm with sinus arrhythmia  Normal ECG  When compared with ECG of 28-JUN-2023 20:44,  Vent. rate has decreased BY  41 BPM  Nonspecific T wave abnormality, improved in Inferior leads  Nonspecific T wave  abnormality, improved in Anterolateral leads  Read at 2025       ED Course    Medications Administered  Medications   sodium chloride 0.9% BOLUS 1,000 mL (0 mLs Intravenous Stopped 6/21/24 2113)   famotidine (PEPCID) tablet 20 mg (20 mg Oral Not Given 6/21/24 2202)   alum & mag hydroxide-simethicone (MAALOX) suspension 15 mL (15 mLs Oral Not Given 6/21/24 2201)   acetaminophen (TYLENOL) tablet 1,000 mg (1,000 mg Oral $Given 6/21/24 2113)     Procedures  Procedures     Discussion of Management  None    Social Determinants of Health adding to complexity of care  None    ED Course  ED Course as of 06/22/24 0209   Fri Jun 21, 2024 1937 I obtained history and examined the patient as noted above.    2101 I rechecked the patient and explained findings.      Medical Decision Making / Diagnosis   CMS Diagnoses: None    MIPS     None    MDM  Patient presents to the ER for evaluation of chest discomfort/epigastric discomfort.  Vital signs reassuring.  Abdominal exam benign.  Lungs clear to auscultation.  ECG sinus rhythm without specific ischemic change.  Serial troponin testing is negative.  Normal D-dimer speaks against occult PE.  Chest x-ray is clear for signs of pneumonia, pneumothorax, pulmonary edema, aortic dissection.  No evidence of pancreatitis or hepatitis or biliary colic on evaluation either.  Considered occult esophagitis or peptic ulcer disease as possibly contributing.  Recommended PPI, dietary modification, follow-up with primary care.  Return precautions discussed.      Disposition  The patient was discharged.     ICD-10 Codes:    ICD-10-CM    1. Chest pain, unspecified type  R07.9            Discharge Medications  Discharge Medication List as of 6/21/2024 10:55 PM        Scribe Disclosure:  I, Mary Lou Reeves, am serving as a scribe at 7:41 PM on 6/21/2024 to document services personally performed by Barrie Cardoso MD based on my observations and the provider's statements to me.      Barrie Cardoso,  MD  06/22/24 0235

## 2024-07-23 ENCOUNTER — HOSPITAL ENCOUNTER (EMERGENCY)
Facility: CLINIC | Age: 24
Discharge: HOME | End: 2024-07-23
Attending: EMERGENCY MEDICINE | Admitting: EMERGENCY MEDICINE
Payer: COMMERCIAL

## 2024-07-23 ENCOUNTER — APPOINTMENT (OUTPATIENT)
Dept: GENERAL RADIOLOGY | Facility: CLINIC | Age: 24
End: 2024-07-23
Attending: EMERGENCY MEDICINE
Payer: COMMERCIAL

## 2024-07-23 VITALS
BODY MASS INDEX: 31.76 KG/M2 | OXYGEN SATURATION: 100 % | WEIGHT: 185 LBS | RESPIRATION RATE: 18 BRPM | SYSTOLIC BLOOD PRESSURE: 140 MMHG | DIASTOLIC BLOOD PRESSURE: 94 MMHG | TEMPERATURE: 98 F | HEART RATE: 60 BPM

## 2024-07-23 DIAGNOSIS — R51.9 HEADACHE, UNSPECIFIED HEADACHE TYPE: ICD-10-CM

## 2024-07-23 DIAGNOSIS — R07.89 ATYPICAL CHEST PAIN: ICD-10-CM

## 2024-07-23 DIAGNOSIS — H53.9 VISION CHANGES: ICD-10-CM

## 2024-07-23 LAB
ANION GAP SERPL CALCULATED.3IONS-SCNC: 11 MMOL/L (ref 7–15)
ATRIAL RATE - MUSE: 55 BPM
BASOPHILS # BLD AUTO: 0 10E3/UL (ref 0–0.2)
BASOPHILS NFR BLD AUTO: 1 %
BUN SERPL-MCNC: 9.2 MG/DL (ref 6–20)
CALCIUM SERPL-MCNC: 8.9 MG/DL (ref 8.8–10.4)
CHLORIDE SERPL-SCNC: 105 MMOL/L (ref 98–107)
CREAT SERPL-MCNC: 0.65 MG/DL (ref 0.51–0.95)
DIASTOLIC BLOOD PRESSURE - MUSE: NORMAL MMHG
EGFRCR SERPLBLD CKD-EPI 2021: >90 ML/MIN/1.73M2
EOSINOPHIL # BLD AUTO: 0.1 10E3/UL (ref 0–0.7)
EOSINOPHIL NFR BLD AUTO: 2 %
ERYTHROCYTE [DISTWIDTH] IN BLOOD BY AUTOMATED COUNT: 14.1 % (ref 10–15)
GLUCOSE SERPL-MCNC: 88 MG/DL (ref 70–99)
HCG SERPL QL: NEGATIVE
HCO3 SERPL-SCNC: 23 MMOL/L (ref 22–29)
HCT VFR BLD AUTO: 35.7 % (ref 35–47)
HGB BLD-MCNC: 11.1 G/DL (ref 11.7–15.7)
HOLD SPECIMEN: NORMAL
HOLD SPECIMEN: NORMAL
IMM GRANULOCYTES # BLD: 0 10E3/UL
IMM GRANULOCYTES NFR BLD: 0 %
INTERPRETATION ECG - MUSE: NORMAL
LYMPHOCYTES # BLD AUTO: 1.4 10E3/UL (ref 0.8–5.3)
LYMPHOCYTES NFR BLD AUTO: 37 %
MCH RBC QN AUTO: 26.8 PG (ref 26.5–33)
MCHC RBC AUTO-ENTMCNC: 31.1 G/DL (ref 31.5–36.5)
MCV RBC AUTO: 86 FL (ref 78–100)
MONOCYTES # BLD AUTO: 0.4 10E3/UL (ref 0–1.3)
MONOCYTES NFR BLD AUTO: 10 %
NEUTROPHILS # BLD AUTO: 2 10E3/UL (ref 1.6–8.3)
NEUTROPHILS NFR BLD AUTO: 51 %
NRBC # BLD AUTO: 0 10E3/UL
NRBC BLD AUTO-RTO: 0 /100
P AXIS - MUSE: 35 DEGREES
PLATELET # BLD AUTO: 183 10E3/UL (ref 150–450)
POTASSIUM SERPL-SCNC: 4 MMOL/L (ref 3.4–5.3)
PR INTERVAL - MUSE: 150 MS
QRS DURATION - MUSE: 88 MS
QT - MUSE: 408 MS
QTC - MUSE: 390 MS
R AXIS - MUSE: 31 DEGREES
RBC # BLD AUTO: 4.14 10E6/UL (ref 3.8–5.2)
SARS-COV-2 RNA RESP QL NAA+PROBE: NEGATIVE
SODIUM SERPL-SCNC: 139 MMOL/L (ref 135–145)
SYSTOLIC BLOOD PRESSURE - MUSE: NORMAL MMHG
T AXIS - MUSE: 17 DEGREES
TROPONIN T SERPL HS-MCNC: <6 NG/L
VENTRICULAR RATE- MUSE: 55 BPM
WBC # BLD AUTO: 3.9 10E3/UL (ref 4–11)

## 2024-07-23 PROCEDURE — 84703 CHORIONIC GONADOTROPIN ASSAY: CPT | Performed by: EMERGENCY MEDICINE

## 2024-07-23 PROCEDURE — 84484 ASSAY OF TROPONIN QUANT: CPT | Performed by: EMERGENCY MEDICINE

## 2024-07-23 PROCEDURE — 99285 EMERGENCY DEPT VISIT HI MDM: CPT | Mod: 25

## 2024-07-23 PROCEDURE — 93005 ELECTROCARDIOGRAM TRACING: CPT

## 2024-07-23 PROCEDURE — 87635 SARS-COV-2 COVID-19 AMP PRB: CPT | Performed by: EMERGENCY MEDICINE

## 2024-07-23 PROCEDURE — 36415 COLL VENOUS BLD VENIPUNCTURE: CPT | Performed by: EMERGENCY MEDICINE

## 2024-07-23 PROCEDURE — 71046 X-RAY EXAM CHEST 2 VIEWS: CPT

## 2024-07-23 PROCEDURE — 80048 BASIC METABOLIC PNL TOTAL CA: CPT | Performed by: EMERGENCY MEDICINE

## 2024-07-23 PROCEDURE — 85049 AUTOMATED PLATELET COUNT: CPT | Performed by: EMERGENCY MEDICINE

## 2024-07-23 PROCEDURE — 250N000013 HC RX MED GY IP 250 OP 250 PS 637: Performed by: EMERGENCY MEDICINE

## 2024-07-23 RX ORDER — ACETAMINOPHEN 325 MG/1
650 TABLET ORAL ONCE
Status: COMPLETED | OUTPATIENT
Start: 2024-07-23 | End: 2024-07-23

## 2024-07-23 RX ADMIN — ACETAMINOPHEN 650 MG: 325 TABLET, FILM COATED ORAL at 12:47

## 2024-07-23 ASSESSMENT — ACTIVITIES OF DAILY LIVING (ADL)
ADLS_ACUITY_SCORE: 35
ADLS_ACUITY_SCORE: 35

## 2024-07-23 NOTE — ED PROVIDER NOTES
Emergency Department Note      History of Present Illness     Chief Complaint   Chest Pain and Generalized Weakness      HPI   Nelsy Quinones is a 23 year old female with a history of anemia and seizure disorder who presents to the ED for an evaluation of chest pain and generalized weakness. Patient reports that blurry vision, fatigue, and a headache began yesterday with chest pain beginning today. She states that the intermittent pain is localized to the center of her chest. She notes that it sometimes worsens when breathing. She also endorses bilateral back of head soreness. Denies recent surgeries, history of blood clots, and states she is not on birth control. However, she notes irregular periods and is unsure if she is pregnant. Reports seizure disorder for which she takes Keppra.     Independent Historian   None    Review of External Notes   I reviewed that the patient had a MRI and MRV in 2021 which was normal.    Reviewed triage note, stating nuanced atrial fibrillation. Patient is not in atrial fibrillation.     Past Medical History     Medical History and Problem List   Anemia  Chlamydia  Seizure disorder    Medications   Keppra    Surgical History   No prior surgeries.    Physical Exam     Patient Vitals for the past 24 hrs:   BP Temp Temp src Pulse Resp SpO2 Weight   07/23/24 1407 (!) 140/94 -- -- 60 -- 100 % --   07/23/24 1140 (!) 127/94 98  F (36.7  C) Temporal 77 18 98 % 83.9 kg (185 lb)     Physical Exam  HENT:      Head: Normocephalic.   Cardiovascular:      Rate and Rhythm: Normal rate and regular rhythm.      Heart sounds: Normal heart sounds.   Pulmonary:      Effort: Pulmonary effort is normal.      Breath sounds: Normal breath sounds.   Abdominal:      General: Bowel sounds are normal.      Palpations: Abdomen is soft.   Musculoskeletal:         General: Normal range of motion.   Skin:     General: Skin is warm.      Capillary Refill: Capillary refill takes less than 2 seconds.    Neurological:      General: No focal deficit present.      Mental Status: She is alert and oriented to person, place, and time.   Psychiatric:         Mood and Affect: Mood normal.           Diagnostics     Lab Results   Labs Ordered and Resulted from Time of ED Arrival to Time of ED Departure   CBC WITH PLATELETS AND DIFFERENTIAL - Abnormal       Result Value    WBC Count 3.9 (*)     RBC Count 4.14      Hemoglobin 11.1 (*)     Hematocrit 35.7      MCV 86      MCH 26.8      MCHC 31.1 (*)     RDW 14.1      Platelet Count 183      % Neutrophils 51      % Lymphocytes 37      % Monocytes 10      % Eosinophils 2      % Basophils 1      % Immature Granulocytes 0      NRBCs per 100 WBC 0      Absolute Neutrophils 2.0      Absolute Lymphocytes 1.4      Absolute Monocytes 0.4      Absolute Eosinophils 0.1      Absolute Basophils 0.0      Absolute Immature Granulocytes 0.0      Absolute NRBCs 0.0     BASIC METABOLIC PANEL - Normal    Sodium 139      Potassium 4.0      Chloride 105      Carbon Dioxide (CO2) 23      Anion Gap 11      Urea Nitrogen 9.2      Creatinine 0.65      GFR Estimate >90      Calcium 8.9      Glucose 88     TROPONIN T, HIGH SENSITIVITY - Normal    Troponin T, High Sensitivity <6     HCG QUALITATIVE PREGNANCY - Normal    hCG Serum Qualitative Negative     COVID-19 VIRUS (CORONAVIRUS) BY PCR       Imaging   Chest XR,  PA & LAT   Final Result   IMPRESSION: No acute cardiopulmonary disease.      MILADY AMEZQUITA MD            SYSTEM ID:  [id number]          EKG   ECG taken at 1147, ECG read at 1142  Simus bradycardia with marked sinus arrhythmia  Otherwise normal ECG   No ECG compared  Rate 55 bpm. AZ interval 150 ms. QRS duration 88 ms. QT/QTc 408/390 ms. P-R-T axes 35 31 17.    Independent Interpretation   None    ED Course      Medications Administered   Medications   acetaminophen (TYLENOL) tablet 650 mg (650 mg Oral $Given 7/23/24 1247)       Procedures   Procedures     Discussion of Management   None    ED  Course   ED Course as of 07/23/24 1414   Tue Jul 23, 2024   1238 I obtained history and examined the patient as noted above.         Optional/Additional Documentation  None    Medical Decision Making / Diagnosis     CMS Diagnoses: None    MIPS       None    MDM   Nelsy Quinones is a 23 year old female who presents with a multiplicity of complaints.  Patient is 23 and has been in the emergency room multiple times.  Review of her chart patient had an MRI and MRV of the brain already.  Patient presents with vague chest pain patient's not at risk for PE.  She is not at risk for coronary artery disease.  EKG is normal no signs of pericarditis.  Troponin is negative no signs of myocarditis.  Chest x-ray performed was negative for pneumothorax.  Patient's vision changes are vague.  There is no emergent need for imaging as observing the patient in the emergency room she is texting on her cell phone.  Patient is encouraged to follow-up with primary care to discuss recurrent chest pain and was discussed the lack of test answers today and lack of need for further workup at this time.  Patient was offered reassurance and discharged home.    Disposition   The patient was discharged.     Diagnosis     ICD-10-CM    1. Vision changes  H53.9       2. Headache, unspecified headache type  R51.9       3. Atypical chest pain  R07.89            Discharge Medications   Discharge Medication List as of 7/23/2024  2:15 PM            Scribe Disclosure:  I, Adelaida Kapoor, am serving as a scribe at 1:23 PM on 7/23/2024 to document services personally performed by Freddy Cordoba MD based on my observations and the provider's statements to me.        Freddy Cordoba MD  07/26/24 3095

## 2024-07-23 NOTE — DISCHARGE INSTRUCTIONS
We have assessed heart and lung possible causes for chest pain today which are normal.  If you continue to experience chest pain please follow-up with the clinic to discuss further workup.  Return to the emergency room with severe increase in pain or shortness of breath.  We do not recommend head imaging today as you have already had MRIs and MRV's of the brain that were normal.  Headaches are commonly not answered by structure of the brain headaches things like migraines tension headaches are common.  Please follow-up with your regular doctor.  If you have abnormalities in vision we recommend seeing a ophthalmologist to assess your vision and your visual acuity.

## 2024-07-23 NOTE — ED TRIAGE NOTES
Arrives via EMS for RUQ pain, midsternal CP, and weakness x 2 days. Denies N/V/D or fever. EMS reports EKG showed possible new onset a-fib. ABCs intact. A/Ox4.

## 2024-08-28 ENCOUNTER — TELEPHONE (OUTPATIENT)
Dept: OPHTHALMOLOGY | Facility: CLINIC | Age: 24
End: 2024-08-28
Payer: COMMERCIAL

## 2024-08-31 ENCOUNTER — OFFICE VISIT (OUTPATIENT)
Dept: OPHTHALMOLOGY | Facility: CLINIC | Age: 24
End: 2024-08-31
Payer: COMMERCIAL

## 2024-08-31 DIAGNOSIS — H40.001 GLAUCOMA SUSPECT OF RIGHT EYE: Primary | ICD-10-CM

## 2024-08-31 PROCEDURE — 92015 DETERMINE REFRACTIVE STATE: CPT | Performed by: STUDENT IN AN ORGANIZED HEALTH CARE EDUCATION/TRAINING PROGRAM

## 2024-08-31 PROCEDURE — 92004 COMPRE OPH EXAM NEW PT 1/>: CPT | Performed by: STUDENT IN AN ORGANIZED HEALTH CARE EDUCATION/TRAINING PROGRAM

## 2024-08-31 ASSESSMENT — CONF VISUAL FIELD
OD_INFERIOR_NASAL_RESTRICTION: 0
OS_SUPERIOR_TEMPORAL_RESTRICTION: 0
METHOD: COUNTING FINGERS
OD_SUPERIOR_NASAL_RESTRICTION: 0
OD_NORMAL: 1
OD_INFERIOR_TEMPORAL_RESTRICTION: 0
OS_INFERIOR_TEMPORAL_RESTRICTION: 0
OD_SUPERIOR_TEMPORAL_RESTRICTION: 0
OS_NORMAL: 1
OS_INFERIOR_NASAL_RESTRICTION: 0
OS_SUPERIOR_NASAL_RESTRICTION: 0

## 2024-08-31 ASSESSMENT — REFRACTION_MANIFEST
OD_SPHERE: +1.25
OS_CYLINDER: SPHERE
OS_SPHERE: +5.00
OD_CYLINDER: SPHERE

## 2024-08-31 ASSESSMENT — VISUAL ACUITY
OD_SC: 20/20
OS_SC: 20/30
METHOD_MR_RETINOSCOPY: 1
METHOD: SNELLEN - LINEAR

## 2024-08-31 ASSESSMENT — SLIT LAMP EXAM - LIDS
COMMENTS: 1+ MEIBOMIAN GLAND DYSFUNCTION
COMMENTS: 1+ MEIBOMIAN GLAND DYSFUNCTION

## 2024-08-31 ASSESSMENT — TONOMETRY
IOP_METHOD: ICARE
OS_IOP_MMHG: 17
OD_IOP_MMHG: 17

## 2024-08-31 ASSESSMENT — CUP TO DISC RATIO
OS_RATIO: 0.25
OD_RATIO: 0.4

## 2024-08-31 ASSESSMENT — EXTERNAL EXAM - RIGHT EYE: OD_EXAM: NORMAL

## 2024-08-31 ASSESSMENT — EXTERNAL EXAM - LEFT EYE: OS_EXAM: NORMAL

## 2024-08-31 NOTE — PROGRESS NOTES
HPI: Nelsy Quinones is a 24 year old female that comes for a complete eye exam. Does not report any changes in vision. Sees floaters sometimes. No eye pain, no redness or irritation     Current ocular medications: None  Prior ocular medications: None    Ocular history:   Amblyopia left eye     Medical history:  None    Review of systems: Does not report fatigue, weight loss, fevers, chills, night sweats, frequent headaches, seizure, fainting, numbness/tingling, weakness, oral ulcers, genital ulcers, recurrent nosebleeds, sinusitis, hearing loss, tinnitus, chronic cough, chest pain, shortness of breath, skin rash, tick bite, easy bruising, easy bleeding, joint pain/stiffness, back pain, recurrent diarrhea, bloody stools, bloody urine.    Family history:   No significant FH    Social history:   Non smoker, smokes marihuana occasionally   Occasional alcohol  No drugs     Impression/Plan:  Hyperopia with anisometropia   Glasses prescription provided     Dry eye each eye   Lid scrubs  Warm compresses  Artificial tears    Glaucoma suspect  C/D ratio asymmetry   No family history, no trauma no steroid use  Discussed with patient that her findings could be related to optic neuropathy vs glaucoma vs physiologic   Will set up appointment in 3 months for glaucoma work up      Return in about 3 months (around 11/30/2024) for Follow Up, DFE, glaucoma evaluation, OCT nerve, optic disc photos.     ATTESTATION     Attending Physician Attestation:      Complete documentation of historical and exam elements from today's encounter can be found in the full encounter summary report (not reduplicated in this progress note).  I personally obtained the chief complaint(s) and history of present illness.  I confirmed and edited as necessary the review of systems, past medical/surgical history, family history, social history, and examination findings as documented by others; and I examined the patient myself.  I personally reviewed the  relevant tests, images, and reports as documented above.  I formulated and edited as necessary the assessment and plan and discussed the findings and management plan with the patient and family    Messi Babin MD  PGY-6 Vitreo-retina surgery Fellow  Department of Ophthalmology   [facility]

## 2024-09-03 ENCOUNTER — TELEPHONE (OUTPATIENT)
Dept: OPHTHALMOLOGY | Facility: CLINIC | Age: 24
End: 2024-09-03
Payer: COMMERCIAL

## 2024-09-03 NOTE — TELEPHONE ENCOUNTER
Spoke with patient regarding scheduling a Return in about 3 months (around 11/30/2024) for Follow Up, DFE, glaucoma evaluation, OCT nerve, optic disc photos.-Per . Scheduled patient accordingly and sent reminder letter and map to confirmed address.-Per Patient

## 2024-10-30 ENCOUNTER — TELEPHONE (OUTPATIENT)
Dept: INTERNAL MEDICINE | Facility: CLINIC | Age: 24
End: 2024-10-30
Payer: COMMERCIAL

## 2024-10-30 NOTE — TELEPHONE ENCOUNTER
Pt calling stating that she was seen in  on 10/28/2024 due to and ongoing URI she has a  non productive cough - and it causes chest pain when coughing     Very tired and having ear pain, sore throat time with the cough  Has been warm and nauseated    Has been slightly SOB when she is moving around today   Headache whole top of head     Denies: fevers, vomiting, blurred vision    Was given cough medication but can not take it during the day       Advised pt that she needs to be seen at  today     Patient stated an understanding and agreed with plan.    Eloise Gillis RN, BSN  [facility]

## 2024-11-12 ENCOUNTER — OFFICE VISIT (OUTPATIENT)
Dept: FAMILY MEDICINE | Facility: CLINIC | Age: 24
End: 2024-11-12
Payer: COMMERCIAL

## 2024-11-12 ENCOUNTER — NURSE TRIAGE (OUTPATIENT)
Dept: INTERNAL MEDICINE | Facility: CLINIC | Age: 24
End: 2024-11-12

## 2024-11-12 VITALS
SYSTOLIC BLOOD PRESSURE: 120 MMHG | WEIGHT: 190 LBS | OXYGEN SATURATION: 100 % | RESPIRATION RATE: 18 BRPM | HEART RATE: 84 BPM | TEMPERATURE: 96.9 F | BODY MASS INDEX: 32.61 KG/M2 | DIASTOLIC BLOOD PRESSURE: 88 MMHG

## 2024-11-12 DIAGNOSIS — N92.6 IRREGULAR PERIODS: ICD-10-CM

## 2024-11-12 DIAGNOSIS — N93.9 VAGINAL BLEEDING: Primary | ICD-10-CM

## 2024-11-12 DIAGNOSIS — Z00.00 ENCOUNTER FOR MEDICAL EXAMINATION TO ESTABLISH CARE: ICD-10-CM

## 2024-11-12 DIAGNOSIS — D50.9 IRON DEFICIENCY ANEMIA, UNSPECIFIED IRON DEFICIENCY ANEMIA TYPE: ICD-10-CM

## 2024-11-12 LAB
ALBUMIN UR-MCNC: NEGATIVE MG/DL
APPEARANCE UR: CLEAR
BILIRUB UR QL STRIP: NEGATIVE
COLOR UR AUTO: YELLOW
ERYTHROCYTE [DISTWIDTH] IN BLOOD BY AUTOMATED COUNT: 15.7 % (ref 10–15)
GLUCOSE UR STRIP-MCNC: NEGATIVE MG/DL
HCT VFR BLD AUTO: 30.5 % (ref 35–47)
HGB BLD-MCNC: 9.6 G/DL (ref 11.7–15.7)
HGB UR QL STRIP: ABNORMAL
KETONES UR STRIP-MCNC: NEGATIVE MG/DL
LEUKOCYTE ESTERASE UR QL STRIP: NEGATIVE
MCH RBC QN AUTO: 25.1 PG (ref 26.5–33)
MCHC RBC AUTO-ENTMCNC: 31.5 G/DL (ref 31.5–36.5)
MCV RBC AUTO: 80 FL (ref 78–100)
NITRATE UR QL: NEGATIVE
PH UR STRIP: 7.5 [PH] (ref 5–7)
PLATELET # BLD AUTO: 192 10E3/UL (ref 150–450)
RBC # BLD AUTO: 3.82 10E6/UL (ref 3.8–5.2)
RBC #/AREA URNS AUTO: ABNORMAL /HPF
SP GR UR STRIP: 1.02 (ref 1–1.03)
UROBILINOGEN UR STRIP-ACNC: 0.2 E.U./DL
WBC # BLD AUTO: 6.5 10E3/UL (ref 4–11)
WBC #/AREA URNS AUTO: ABNORMAL /HPF

## 2024-11-12 PROCEDURE — 85027 COMPLETE CBC AUTOMATED: CPT

## 2024-11-12 PROCEDURE — 83550 IRON BINDING TEST: CPT

## 2024-11-12 PROCEDURE — 83540 ASSAY OF IRON: CPT

## 2024-11-12 PROCEDURE — 84702 CHORIONIC GONADOTROPIN TEST: CPT

## 2024-11-12 PROCEDURE — 80048 BASIC METABOLIC PNL TOTAL CA: CPT

## 2024-11-12 PROCEDURE — 99203 OFFICE O/P NEW LOW 30 MIN: CPT

## 2024-11-12 PROCEDURE — G2211 COMPLEX E/M VISIT ADD ON: HCPCS

## 2024-11-12 PROCEDURE — 36415 COLL VENOUS BLD VENIPUNCTURE: CPT

## 2024-11-12 PROCEDURE — 81001 URINALYSIS AUTO W/SCOPE: CPT

## 2024-11-12 RX ORDER — FERROUS GLUCONATE 324(38)MG
324 TABLET ORAL
Qty: 90 TABLET | Refills: 0 | Status: SHIPPED | OUTPATIENT
Start: 2024-11-12 | End: 2025-02-10

## 2024-11-12 ASSESSMENT — PATIENT HEALTH QUESTIONNAIRE - PHQ9
SUM OF ALL RESPONSES TO PHQ QUESTIONS 1-9: 15
SUM OF ALL RESPONSES TO PHQ QUESTIONS 1-9: 15
10. IF YOU CHECKED OFF ANY PROBLEMS, HOW DIFFICULT HAVE THESE PROBLEMS MADE IT FOR YOU TO DO YOUR WORK, TAKE CARE OF THINGS AT HOME, OR GET ALONG WITH OTHER PEOPLE: VERY DIFFICULT

## 2024-11-12 ASSESSMENT — PAIN SCALES - GENERAL: PAINLEVEL_OUTOF10: MODERATE PAIN (5)

## 2024-11-12 NOTE — TELEPHONE ENCOUNTER
Nurse Triage SBAR    Is this a 2nd Level Triage? NO    Situation: vaginal bleeding    Background: seen 5/14 at [facility] for same issue. Saw [facility] on 5/3 and 2/24.     Assessment: irregular periods the past few months. Lasting a month long. Went to doctor, they thought it was because just had a baby. Even when their long, 4 weeks, its really light. Over the past 1 week, has been more heavy. Headaches, fatigue. Filling up pads, wears overnight pads. Soaking it in an hour. Fast walking/running, makes her bleed more. Clots every time she goes to the bathroom. Lightheaded, dizzy, body aches. Is not currently taking birth control.     Her baby turned 1 in September.    At work right now.     Protocol Recommended Disposition:   Go To Office Now    Recommendation: scheduled visit this afternoon in [place]. Advised if symptoms worsen to be call back or go to ER. Patient stated an understanding and agreed with plan.     Does the patient meet one of the following criteria for ADS visit consideration? No     Patient stated an understanding and agreed with plan.     STERLING DANIELS RN on 11/12/2024 at 12:29 PM   [facility]         Reason for Disposition   MODERATE vaginal bleeding (i.e., soaking pad or tampon per hour and present > 6 hours; 1 menstrual cup every 6 hours)    Protocols used: Vaginal Bleeding - [id number]-C-IC

## 2024-11-12 NOTE — LETTER
November 12, 2024      Nelsy Quinones  [id number] [address] [id number]        To Whom It May Concern:    Nelsy Quinones was seen on 11/12.  Please excuse her from work today 11/12 due to illness. She can return back to work 11/13.        Sincerely,        Kaylen Rodriguez PA-C

## 2024-11-12 NOTE — PROGRESS NOTES
"  Assessment & Plan     Vaginal bleeding  Irregular periods  Workup for abnormal vaginal bleeding was initiated.  Obtained pregnancy test, UA, CBC, BMP, iron studies, and ordered transvaginal pelvic ultrasound, and referral was made.  Differentials at this time include ovarian cyst, uterine fibroids, uterine cyst, or pregnancy.  UA did not show any signs of infection.  CBC did show that patient is anemic with a hemoglobin of 9.6.  We will start patient on ferrous gluconate tablets to be taken once daily.  Advised patient to take iron tablet with orange juice.  Will follow-up in 3 weeks to recheck hemoglobin level.  Other labs still pending.  In addition to placing referral with OB/GYN, placed referral for patient to establish care with PCP.      - HCG quantitative pregnancy  - UA Macroscopic with reflex to Microscopic and Culture - Lab Collect  - CBC with platelets  - Basic metabolic panel  (Ca, Cl, CO2, Creat, Gluc, K, Na, BUN)  - Iron and iron binding capacity  - US Pelvic Complete with Transvaginal  - Ob/Gyn  Referral    Iron deficiency anemia, unspecified iron deficiency anemia type  - ferrous gluconate (FERGON) 324 (38 Fe) MG tablet  Dispense: 90 tablet; Refill: 0    Encounter for medical examination to establish care  - Primary Care Referral    Patient should follow up with OB/GYN, schedule pelvic US, and make appointment with PCP. We will follow up on anemia and iron replacement therapy in 3 weeks. Patient should return sooner for new or worsening symptoms. All questions answered to patient's satisfaction. Warning signs of when to seek emergency care were discussed.     Kaylen Rodriguez PA-C      BMI  Estimated body mass index is 32.61 kg/m  as calculated from the following:    Height as of 5/3/24: 1.626 m (5' 4\").    Weight as of this encounter: 86.2 kg (190 lb).   Weight management plan: Patient was referred to their PCP to discuss a diet and exercise plan.      Cailin Whittington is a 24 year old, " presenting for the following health issues:  Vaginal Bleeding        2024     4:00 PM   Additional Questions   Roomed by Melina Don RN   Accompanied by Self         2024   Forms   Any forms needing to be completed Yes    Yes       Multiple values from one day are sorted in reverse-chronological order     History of Present Illness       Reason for visit:  Nurse suggestion  Symptom onset:  3-7 days ago  Symptoms include:  Irragular period bleeding  Symptom intensity:  Moderate  Symptom progression:  Worsening  Had these symptoms before:  No  What makes it worse:  Too much movement  What makes it better:  No She is missing 5 dose(s) of medications per week.  She is not taking prescribed medications regularly due to remembering to take.    Nelsy is a 24-year-old  female who presents today with vaginal bleeding.  Patient reports that her last baby was born about 1 year ago and that her periods have been irregular ever since giving birth.  She has had some periods that have lasted up to 4 weeks and then will have a few weeks without a period, and then will go back to having a 4-week long period again.  She was seen by a provider and was offered birth control, but patient ultimately decided she was not comfortable with the birth control side effects.  She is not currently on any birth control.  Over the last few months periods became light again, but the last 2 weeks she has had heavier periods again.  She is currently bleeding through 1 pad an hour.  States that she is passing blood clots.  She is having full body aches, headaches, brain fog, blurred vision, tiredness, and fatigue in addition to nausea and some acid reflux.  No fevers, chills, body aches.  She does endorse mild diffuse lower abdominal pain and cramping. She had a history of anemia with her last pregnancy and states she had similar symptoms at that time.    Patient reports that she is not currently sexually active.    Menstrual  Concern  Onset/Duration: 4 weeks ago  Description:   Duration of bleeding episodes: everyday  Frequency of periods: (1st day of one to 1st day of next):  inconsistent   Describe bleeding/flow:   Clots: YES- dropping in the toilet  Number of pads/day: 2 pads a day, today 1 pad an hour        Cramping: severe and after  Accompanying Signs & Symptoms:  Lightheadedness: YES  Temperature intolerance: YES  Nosebleeds/Easy bruising: No  Vaginal Discharge: No  Acne: increased acne until last week   Change in body hair: No  History:  Patient's last menstrual period was 10/15/2024 (approximate).  Previous normal periods: YES  Contraceptive use: NO  Sexually active: YES  Any bleeding after intercourse: No  Abnormal PAP Smears: YES  Precipitating or alleviating factors: None  Therapies tried and outcome: None    Review of Systems  Constitutional, neuro, ENT, endocrine, pulmonary, cardiac, gastrointestinal, genitourinary, musculoskeletal, integument and psychiatric systems are negative, except as otherwise noted.      Objective    /88 (BP Location: Right arm, Patient Position: Sitting, Cuff Size: Adult Regular)   Pulse 84   Temp 96.9  F (36.1  C) (Tympanic)   Resp 18   Wt 86.2 kg (190 lb)   LMP 10/15/2024 (Approximate)   SpO2 100%   BMI 32.61 kg/m    Body mass index is 32.61 kg/m .  Physical Exam   GENERAL: alert and no distress, lips have slightly pale appearance.   NECK: no adenopathy, no asymmetry, masses, or scars  RESP: lungs clear to auscultation - no rales, rhonchi or wheezes  CV: regular rate and rhythm, normal S1 S2, no S3 or S4, no murmur, click or rub, no peripheral edema  ABDOMEN: soft, nontender, no hepatosplenomegaly, no masses and bowel sounds normal   (female): patient deferred  exam.  MS: no gross musculoskeletal defects noted, no edema  BACK: no CVA tenderness, no paralumbar tenderness  PSYCH: mentation appears normal, affect normal/bright      Signed Electronically by: Kaylen Rodriguez PA-C

## 2024-11-13 LAB
ANION GAP SERPL CALCULATED.3IONS-SCNC: 11 MMOL/L (ref 7–15)
BUN SERPL-MCNC: 11.6 MG/DL (ref 6–20)
CALCIUM SERPL-MCNC: 9.3 MG/DL (ref 8.8–10.4)
CHLORIDE SERPL-SCNC: 105 MMOL/L (ref 98–107)
CREAT SERPL-MCNC: 0.7 MG/DL (ref 0.51–0.95)
EGFRCR SERPLBLD CKD-EPI 2021: >90 ML/MIN/1.73M2
GLUCOSE SERPL-MCNC: 102 MG/DL (ref 70–99)
HCG INTACT+B SERPL-ACNC: <1 MIU/ML
HCO3 SERPL-SCNC: 25 MMOL/L (ref 22–29)
IRON BINDING CAPACITY (ROCHE): 404 UG/DL (ref 240–430)
IRON SATN MFR SERPL: 18 % (ref 15–46)
IRON SERPL-MCNC: 73 UG/DL (ref 37–145)
POTASSIUM SERPL-SCNC: 3.9 MMOL/L (ref 3.4–5.3)
SODIUM SERPL-SCNC: 141 MMOL/L (ref 135–145)

## 2024-11-19 ENCOUNTER — ANCILLARY PROCEDURE (OUTPATIENT)
Dept: ULTRASOUND IMAGING | Facility: CLINIC | Age: 24
End: 2024-11-19
Payer: COMMERCIAL

## 2024-11-19 DIAGNOSIS — N93.9 VAGINAL BLEEDING: ICD-10-CM

## 2024-11-19 DIAGNOSIS — N92.6 IRREGULAR PERIODS: ICD-10-CM

## 2024-11-29 PROBLEM — O12.03 EDEMA IN PREGNANCY IN THIRD TRIMESTER: Status: RESOLVED | Noted: 2021-10-19 | Resolved: 2024-11-29

## 2024-11-29 PROBLEM — Z36.89 ENCOUNTER FOR TRIAGE IN PREGNANT PATIENT: Status: RESOLVED | Noted: 2023-04-06 | Resolved: 2024-11-29

## 2024-12-02 ENCOUNTER — PATIENT OUTREACH (OUTPATIENT)
Dept: CARE COORDINATION | Facility: CLINIC | Age: 24
End: 2024-12-02
Payer: COMMERCIAL

## 2024-12-02 DIAGNOSIS — Z71.89 OTHER SPECIFIED COUNSELING: Primary | Chronic | ICD-10-CM

## 2024-12-02 NOTE — Clinical Note
Jaden Pederson  [facility] SW Assessment is scheduled for 12/5/24 at 1pm.   Thanks!  Diana

## 2024-12-02 NOTE — PROGRESS NOTES
Clinic Care Coordination Contact  Community Health Worker Initial Outreach    CHW Initial Information Gathering:  Referral Source: PCP  Preferred Hospital: [facility], [place]  [phone]  Current living arrangement:: I live in a private home with family ( and 2 children (age 1 and 2))  Type of residence:: Apartment  Community Resources: [facility], WIC ( Assistance)  Supplies Currently Used at Home: None  Equipment Currently Used at Home: none  Informal Support system:: Spouse, Parent  No PCP office visit in Past Year: No  Transportation means:: Other, Public transportation ([employer])  CHW Additional Questions  If ED/Hospital discharge, follow-up appointment scheduled as recommended?: N/A  Medication changes made following ED/Hospital discharge?: N/A  MyChart active?: Yes  Patient sent CC and Social Drivers of Health questionnaires?: Yes    Patient accepts CC: Yes. Patient scheduled for assessment with  CC on 12/5/24 at 1pm.     Patient noted desire to discuss the following concerns/needs:  Food including [employer] benefits (SNAP/CASH, Emergency/Energy assistance)  Housing support/resources (rent assistance)  Transportation resources   Concerns on unpaid bills including for utilities   Support/resources for mental health     FRW referral for SNAP/CASH and Emergency/Energy assistance entered for further assistance.     Best time to reach patient: 12pm-2pm or after 4pm     Diana Godoy  Community Health Worker   [email or web address]   Clinic Care Coordination / Ambulatory Care Management  [facility]  [email or web address]  Office: [phone]  Gender Pronouns: She/her/hers  Employed by [employer] [facility] [employer]

## 2024-12-05 ENCOUNTER — PATIENT OUTREACH (OUTPATIENT)
Dept: CARE COORDINATION | Facility: CLINIC | Age: 24
End: 2024-12-05

## 2024-12-05 NOTE — PROGRESS NOTES
Clinic Care Coordination Contact  [facility]/Voicemail    Clinical Data: Care Coordinator Outreach    Outreach Documentation Number of Outreach Attempt   12/5/2024   1:06 PM 1     PCP CC referral entered. CHW completed initial intake and scheduled an assessment with this writer today at 1pm. CC call at scheduled time and no answer. CC left a message on patient's voicemail with call back information and requested return call.    Plan: Care Coordinator will try to reach patient again in 3-5 business days.    ADDENDUM: Pt returned CC call at 2:15pm and apologized for missing CC call. Asked CC if we can reschedule for tomorrow (12/06/24) at 11:30am. Ok with CC. Rescheduled and will plan to follow-up tomorrow as requested.     Dacia Gutierrez Stephens Memorial HospitalANICETO  [facility]   Primary Care Social Work Care Coordinator  [facility] [place]   Phone: [phone]

## 2024-12-05 NOTE — TELEPHONE ENCOUNTER
Frances Update:    12/5/24- w established contact with pt, she state that she already apply for the county benefit yesterday. No further assistance needed at this time. w closed the referral and removed her from panel.

## 2024-12-17 ENCOUNTER — MYC MEDICAL ADVICE (OUTPATIENT)
Dept: INTERNAL MEDICINE | Facility: CLINIC | Age: 24
End: 2024-12-17
Payer: COMMERCIAL

## 2025-01-06 ENCOUNTER — TELEPHONE (OUTPATIENT)
Dept: BEHAVIORAL HEALTH | Facility: CLINIC | Age: 25
End: 2025-01-06
Payer: COMMERCIAL

## 2025-01-07 ENCOUNTER — PATIENT OUTREACH (OUTPATIENT)
Dept: CARE COORDINATION | Facility: CLINIC | Age: 25
End: 2025-01-07
Payer: COMMERCIAL

## 2025-01-07 NOTE — PROGRESS NOTES
Clinic Care Coordination Contact  [facility]/Voicemail    Clinical Data: Care Coordinator Outreach    Outreach Documentation Number of Outreach Attempt   1/7/2025   9:18 AM 1     Left message on patient's voicemail with call back information and requested return call. Notified pt that CC will be out of the office and encouraged pt to call CC back in the next day or two. If not, CC will follow-up when back in the office. Pt also notified that covering  CC will be checking CC voicemail if needs arise.     Plan: Care Coordinator will try to reach patient again in 1 month.    DALE Leon  [facility]   Primary Care Social Work Care Coordinator  [facility] [place]   Phone: [phone]

## 2025-01-21 ENCOUNTER — HOSPITAL ENCOUNTER (EMERGENCY)
Facility: CLINIC | Age: 25
Discharge: HOME | End: 2025-01-21
Attending: EMERGENCY MEDICINE | Admitting: EMERGENCY MEDICINE
Payer: COMMERCIAL

## 2025-01-21 ENCOUNTER — APPOINTMENT (OUTPATIENT)
Dept: GENERAL RADIOLOGY | Facility: CLINIC | Age: 25
End: 2025-01-21
Attending: EMERGENCY MEDICINE
Payer: COMMERCIAL

## 2025-01-21 ENCOUNTER — APPOINTMENT (OUTPATIENT)
Dept: ULTRASOUND IMAGING | Facility: CLINIC | Age: 25
End: 2025-01-21
Attending: EMERGENCY MEDICINE
Payer: COMMERCIAL

## 2025-01-21 VITALS
BODY MASS INDEX: 32.82 KG/M2 | TEMPERATURE: 98 F | OXYGEN SATURATION: 99 % | DIASTOLIC BLOOD PRESSURE: 85 MMHG | SYSTOLIC BLOOD PRESSURE: 122 MMHG | WEIGHT: 192.24 LBS | HEART RATE: 65 BPM | RESPIRATION RATE: 12 BRPM | HEIGHT: 64 IN

## 2025-01-21 DIAGNOSIS — R10.2 PELVIC PAIN: ICD-10-CM

## 2025-01-21 DIAGNOSIS — B96.89 BACTERIAL VAGINOSIS: ICD-10-CM

## 2025-01-21 DIAGNOSIS — R07.9 CHEST PAIN, UNSPECIFIED TYPE: ICD-10-CM

## 2025-01-21 DIAGNOSIS — N76.0 BACTERIAL VAGINOSIS: ICD-10-CM

## 2025-01-21 LAB
ALBUMIN SERPL BCG-MCNC: 4.1 G/DL (ref 3.5–5.2)
ALBUMIN UR-MCNC: 10 MG/DL
ALP SERPL-CCNC: 55 U/L (ref 40–150)
ALT SERPL W P-5'-P-CCNC: 25 U/L (ref 0–50)
ANION GAP SERPL CALCULATED.3IONS-SCNC: 10 MMOL/L (ref 7–15)
APPEARANCE UR: ABNORMAL
AST SERPL W P-5'-P-CCNC: 19 U/L (ref 0–45)
ATRIAL RATE - MUSE: 72 BPM
BASOPHILS # BLD AUTO: 0 10E3/UL (ref 0–0.2)
BASOPHILS NFR BLD AUTO: 0 %
BILIRUB DIRECT SERPL-MCNC: <0.2 MG/DL (ref 0–0.3)
BILIRUB SERPL-MCNC: 0.2 MG/DL
BILIRUB UR QL STRIP: NEGATIVE
BUN SERPL-MCNC: 10.5 MG/DL (ref 6–20)
CALCIUM SERPL-MCNC: 9.1 MG/DL (ref 8.8–10.4)
CHLORIDE SERPL-SCNC: 105 MMOL/L (ref 98–107)
CLUE CELLS: PRESENT
COLOR UR AUTO: YELLOW
CREAT SERPL-MCNC: 0.71 MG/DL (ref 0.51–0.95)
D DIMER PPP FEU-MCNC: <0.27 UG/ML FEU (ref 0–0.5)
DIASTOLIC BLOOD PRESSURE - MUSE: NORMAL MMHG
EGFRCR SERPLBLD CKD-EPI 2021: >90 ML/MIN/1.73M2
EOSINOPHIL # BLD AUTO: 0.1 10E3/UL (ref 0–0.7)
EOSINOPHIL NFR BLD AUTO: 2 %
ERYTHROCYTE [DISTWIDTH] IN BLOOD BY AUTOMATED COUNT: 16.5 % (ref 10–15)
GLUCOSE SERPL-MCNC: 86 MG/DL (ref 70–99)
GLUCOSE UR STRIP-MCNC: NEGATIVE MG/DL
HCG SERPL QL: NEGATIVE
HCO3 SERPL-SCNC: 25 MMOL/L (ref 22–29)
HCT VFR BLD AUTO: 36.2 % (ref 35–47)
HGB BLD-MCNC: 11.5 G/DL (ref 11.7–15.7)
HGB UR QL STRIP: NEGATIVE
IMM GRANULOCYTES # BLD: 0 10E3/UL
IMM GRANULOCYTES NFR BLD: 0 %
INTERPRETATION ECG - MUSE: NORMAL
KETONES UR STRIP-MCNC: NEGATIVE MG/DL
LEUKOCYTE ESTERASE UR QL STRIP: NEGATIVE
LIPASE SERPL-CCNC: 30 U/L (ref 13–60)
LYMPHOCYTES # BLD AUTO: 1.7 10E3/UL (ref 0.8–5.3)
LYMPHOCYTES NFR BLD AUTO: 36 %
MCH RBC QN AUTO: 26.3 PG (ref 26.5–33)
MCHC RBC AUTO-ENTMCNC: 31.8 G/DL (ref 31.5–36.5)
MCV RBC AUTO: 83 FL (ref 78–100)
MONOCYTES # BLD AUTO: 0.4 10E3/UL (ref 0–1.3)
MONOCYTES NFR BLD AUTO: 10 %
MUCOUS THREADS #/AREA URNS LPF: PRESENT /LPF
NEUTROPHILS # BLD AUTO: 2.4 10E3/UL (ref 1.6–8.3)
NEUTROPHILS NFR BLD AUTO: 51 %
NITRATE UR QL: NEGATIVE
NRBC # BLD AUTO: 0 10E3/UL
NRBC BLD AUTO-RTO: 0 /100
P AXIS - MUSE: 34 DEGREES
PH UR STRIP: 6 [PH] (ref 5–7)
PLATELET # BLD AUTO: 198 10E3/UL (ref 150–450)
POTASSIUM SERPL-SCNC: 3.9 MMOL/L (ref 3.4–5.3)
PR INTERVAL - MUSE: 150 MS
PROT SERPL-MCNC: 6.9 G/DL (ref 6.4–8.3)
QRS DURATION - MUSE: 90 MS
QT - MUSE: 384 MS
QTC - MUSE: 420 MS
R AXIS - MUSE: 27 DEGREES
RBC # BLD AUTO: 4.37 10E6/UL (ref 3.8–5.2)
RBC URINE: 2 /HPF
SODIUM SERPL-SCNC: 140 MMOL/L (ref 135–145)
SP GR UR STRIP: 1.03 (ref 1–1.03)
SQUAMOUS EPITHELIAL: 8 /HPF
SYSTOLIC BLOOD PRESSURE - MUSE: NORMAL MMHG
T AXIS - MUSE: 5 DEGREES
TRICHOMONAS, WET PREP: ABNORMAL
TROPONIN T SERPL HS-MCNC: <6 NG/L
UROBILINOGEN UR STRIP-MCNC: NORMAL MG/DL
VENTRICULAR RATE- MUSE: 72 BPM
WBC # BLD AUTO: 4.6 10E3/UL (ref 4–11)
WBC URINE: 1 /HPF
WBC'S/HIGH POWER FIELD, WET PREP: ABNORMAL
YEAST, WET PREP: ABNORMAL

## 2025-01-21 PROCEDURE — 76830 TRANSVAGINAL US NON-OB: CPT

## 2025-01-21 PROCEDURE — 87491 CHLMYD TRACH DNA AMP PROBE: CPT | Performed by: EMERGENCY MEDICINE

## 2025-01-21 PROCEDURE — 80053 COMPREHEN METABOLIC PANEL: CPT | Performed by: EMERGENCY MEDICINE

## 2025-01-21 PROCEDURE — 36415 COLL VENOUS BLD VENIPUNCTURE: CPT | Performed by: EMERGENCY MEDICINE

## 2025-01-21 PROCEDURE — 83690 ASSAY OF LIPASE: CPT | Performed by: EMERGENCY MEDICINE

## 2025-01-21 PROCEDURE — 96374 THER/PROPH/DIAG INJ IV PUSH: CPT

## 2025-01-21 PROCEDURE — 87210 SMEAR WET MOUNT SALINE/INK: CPT | Performed by: EMERGENCY MEDICINE

## 2025-01-21 PROCEDURE — 82248 BILIRUBIN DIRECT: CPT | Performed by: EMERGENCY MEDICINE

## 2025-01-21 PROCEDURE — 71046 X-RAY EXAM CHEST 2 VIEWS: CPT

## 2025-01-21 PROCEDURE — 250N000011 HC RX IP 250 OP 636: Performed by: EMERGENCY MEDICINE

## 2025-01-21 PROCEDURE — 93005 ELECTROCARDIOGRAM TRACING: CPT

## 2025-01-21 PROCEDURE — 81001 URINALYSIS AUTO W/SCOPE: CPT | Performed by: EMERGENCY MEDICINE

## 2025-01-21 PROCEDURE — 85025 COMPLETE CBC W/AUTO DIFF WBC: CPT | Performed by: EMERGENCY MEDICINE

## 2025-01-21 PROCEDURE — 85379 FIBRIN DEGRADATION QUANT: CPT | Performed by: EMERGENCY MEDICINE

## 2025-01-21 PROCEDURE — 84703 CHORIONIC GONADOTROPIN ASSAY: CPT | Performed by: EMERGENCY MEDICINE

## 2025-01-21 PROCEDURE — 99285 EMERGENCY DEPT VISIT HI MDM: CPT | Mod: 25

## 2025-01-21 PROCEDURE — 93976 VASCULAR STUDY: CPT

## 2025-01-21 PROCEDURE — 76856 US EXAM PELVIC COMPLETE: CPT

## 2025-01-21 PROCEDURE — 84484 ASSAY OF TROPONIN QUANT: CPT | Performed by: EMERGENCY MEDICINE

## 2025-01-21 PROCEDURE — 250N000013 HC RX MED GY IP 250 OP 250 PS 637: Performed by: EMERGENCY MEDICINE

## 2025-01-21 RX ORDER — METRONIDAZOLE 500 MG/1
500 TABLET ORAL 2 TIMES DAILY
Qty: 14 TABLET | Refills: 0 | Status: SHIPPED | OUTPATIENT
Start: 2025-01-21 | End: 2025-01-28

## 2025-01-21 RX ORDER — FAMOTIDINE 20 MG/1
20 TABLET, FILM COATED ORAL 2 TIMES DAILY
Qty: 14 TABLET | Refills: 0 | Status: SHIPPED | OUTPATIENT
Start: 2025-01-21 | End: 2025-01-28

## 2025-01-21 RX ORDER — MAGNESIUM HYDROXIDE/ALUMINUM HYDROXICE/SIMETHICONE 120; 1200; 1200 MG/30ML; MG/30ML; MG/30ML
15 SUSPENSION ORAL ONCE
Status: COMPLETED | OUTPATIENT
Start: 2025-01-21 | End: 2025-01-21

## 2025-01-21 RX ADMIN — FAMOTIDINE 20 MG: 10 INJECTION, SOLUTION INTRAVENOUS at 12:47

## 2025-01-21 RX ADMIN — ALUMINUM HYDROXIDE, MAGNESIUM HYDROXIDE, AND DIMETHICONE 15 ML: 200; 20; 200 SUSPENSION ORAL at 12:47

## 2025-01-21 ASSESSMENT — ACTIVITIES OF DAILY LIVING (ADL)
ADLS_ACUITY_SCORE: 42

## 2025-01-21 ASSESSMENT — COLUMBIA-SUICIDE SEVERITY RATING SCALE - C-SSRS
6. HAVE YOU EVER DONE ANYTHING, STARTED TO DO ANYTHING, OR PREPARED TO DO ANYTHING TO END YOUR LIFE?: NO
2. HAVE YOU ACTUALLY HAD ANY THOUGHTS OF KILLING YOURSELF IN THE PAST MONTH?: NO
1. IN THE PAST MONTH, HAVE YOU WISHED YOU WERE DEAD OR WISHED YOU COULD GO TO SLEEP AND NOT WAKE UP?: NO

## 2025-01-21 NOTE — ED TRIAGE NOTES
CC: Chest pain    Onset: Sudden    Location: Midsternum, radiates to back and right side    Duration: Chest pain - 2 days, abdominal/pelvic cramping x1 day.    Character: Sharp, Tightness, heavy    Aggravating Factors: Chest pain worsens when going from lying to sitting    Relieving Factors: None. No interventions tried PTA.    Timing: Constant, intermittent sharp pain on right rib line.    Severity: 6/10 -9/10    Associated Symptoms: Nausea and headaches began at 0200, no vomiting.     Vaginal discharge has been cloudy x 3 days    Hx irregular periods - has not had menstrual cycle in 5 weeks. Has been pregnancy tests at home over the last two weeks which were negative.     No urinary itching, burning, or frequency. No diarrhea. Last BM yesterday.       Triage Assessment (Adult)       Row Name 01/21/25 1118          Triage Assessment    Airway WDL WDL        Respiratory WDL    Respiratory WDL WDL        Cardiac WDL    Cardiac WDL X;chest pain        Chest Pain Assessment    Chest Pain Location midsternal     Character tightness     Precipitating Factors activity;at rest

## 2025-01-21 NOTE — ED PROVIDER NOTES
Emergency Department Note      History of Present Illness     Chief Complaint   Chest Pain and Pelvic Pain      HPI   Nelsy Quinones is a 24 year old female with a history of seizure disorder who presents to the ED for evaluation of chest pain and pelvic pain. Patient reports sudden onset of sharp pelvic pain around 2300 last night as soon as she laid down after putting her kids to bed. The pain radiated to her back, then she experienced sharp chest pain associated with nausea and headache. The chest pain lasted 3-4 hours before improving. Since last night, she has also had intermittent abdominal pain. In the ED, she states her back is still sore when moving and she has a intermittent burning sensation in her chest and abdomen however the chest pain is more constant. She rates her pain as a 7/10. Patient notes she has seen cloudy water drops after urination for the past two days. States her vaginal discharge is normal. Her last menstrual period was about 5 weeks ago. At home pregnancy test was negative. She denies concerns for STI. No history of STI. Patient notes two other instances of chest pain in the past, but she did not have burning sensations or chest tightness with the episodes. No history of abdominal surgeries, cardiac problems, or blood clots. Notes occasional use of alcohol, last drink was a few weeks ago. Patient is not on birth control.  No known history of premature cardiac disease    Independent Historian   None    Review of External Notes   Office visit note reviewed from 12/20/24 for mood disorder / depression.    Past Medical History     Medical History and Problem List   Suicide attempt  Anemia  Mood disorder  Depression  Seizure disorder  Chlamydia  Hyperopia  Esotropia    Medications   Ferrous gluconate  Levetiracetam    Surgical History   The patient has no pertinent past surgical history.     Physical Exam     Patient Vitals for the past 24 hrs:   BP Temp Temp src Pulse Resp SpO2 Height  "Weight   01/21/25 1517 122/85 -- -- 65 -- 99 % -- --   01/21/25 1127 128/78 98  F (36.7  C) Oral 86 12 98 % -- --   01/21/25 1116 -- -- -- -- -- -- 1.626 m (5' 4\") 87.2 kg (192 lb 3.9 oz)     Physical Exam    General:              Well-nourished              Speaking in full sentences              Resting comfortably on gurney  Eyes:              Conjunctiva without injection or scleral icterus  ENT:              Moist mucous membranes              Nares patent              Pinnae normal  Neck:              Full ROM              No stiffness appreciated  Resp:              Lungs CTAB              No crackles, wheezing or audible rubs              Good air movement  CV:                    Normal rate, regular rhythm              S1 and S2 present              No murmur, gallop or rub  GI:              BS present              Abdomen soft without distention              Mild tenderness to palpation to epigastric region              No guarding or rebound tenderness  Skin:              Warm, dry, well perfused              No rashes or open wounds on exposed skin  MSK:              Moves all extremities              No focal deformities or swelling              Tenderness to palpation over anterior chest wall, reproduces her symptoms in the chest  Neuro:              Alert              Answers questions appropriately              Moves all extremities equally              Gait stable  Psych:              Normal affect, normal mood       Diagnostics     Lab Results   Labs Ordered and Resulted from Time of ED Arrival to Time of ED Departure   ROUTINE UA WITH MICROSCOPIC REFLEX TO CULTURE - Abnormal       Result Value    Color Urine Yellow      Appearance Urine Slightly Cloudy (*)     Glucose Urine Negative      Bilirubin Urine Negative      Ketones Urine Negative      Specific Gravity Urine 1.032      Blood Urine Negative      pH Urine 6.0      Protein Albumin Urine 10 (*)     Urobilinogen Urine Normal      Nitrite Urine " Negative      Leukocyte Esterase Urine Negative      Mucus Urine Present (*)     RBC Urine 2      WBC Urine 1      Squamous Epithelials Urine 8 (*)    CBC WITH PLATELETS AND DIFFERENTIAL - Abnormal    WBC Count 4.6      RBC Count 4.37      Hemoglobin 11.5 (*)     Hematocrit 36.2      MCV 83      MCH 26.3 (*)     MCHC 31.8      RDW 16.5 (*)     Platelet Count 198      % Neutrophils 51      % Lymphocytes 36      % Monocytes 10      % Eosinophils 2      % Basophils 0      % Immature Granulocytes 0      NRBCs per 100 WBC 0      Absolute Neutrophils 2.4      Absolute Lymphocytes 1.7      Absolute Monocytes 0.4      Absolute Eosinophils 0.1      Absolute Basophils 0.0      Absolute Immature Granulocytes 0.0      Absolute NRBCs 0.0     WET PREPARATION - Abnormal    Trichomonas Absent      Yeast Absent      Clue Cells Present (*)     WBCs/high power field 1+ (*)    BASIC METABOLIC PANEL - Normal    Sodium 140      Potassium 3.9      Chloride 105      Carbon Dioxide (CO2) 25      Anion Gap 10      Urea Nitrogen 10.5      Creatinine 0.71      GFR Estimate >90      Calcium 9.1      Glucose 86     TROPONIN T, HIGH SENSITIVITY - Normal    Troponin T, High Sensitivity <6     HCG QUALITATIVE PREGNANCY - Normal    hCG Serum Qualitative Negative     D DIMER QUANTITATIVE - Normal    D-Dimer Quantitative <0.27     HEPATIC FUNCTION PANEL - Normal    Protein Total 6.9      Albumin 4.1      Bilirubin Total 0.2      Alkaline Phosphatase 55      AST 19      ALT 25      Bilirubin Direct <0.20     LIPASE - Normal    Lipase 30     CHLAMYDIA TRACHOMATIS/NEISSERIA GONORRHOEAE BY PCR       Imaging   Chest XR,  PA & LAT   Final Result   IMPRESSION: Negative chest.      US Pelvis Cmplt w Transvag & Doppler LmtPel Duplex Limited   Final Result   IMPRESSION:        1.  No evidence of ovarian torsion or other acute abnormality.      2.  Morphologic features of the ovaries which would support a clinical diagnosis of polycystic ovarian syndrome.           EKG   ECG results from 01/21/25   EKG 12-lead, tracing only     Value    Systolic Blood Pressure     Diastolic Blood Pressure     Ventricular Rate 72    Atrial Rate 72    KY Interval 150    QRS Duration 90        QTc 420    P Axis 34    R AXIS 27    T Axis 5    Interpretation ECG      Sinus rhythm with sinus arrhythmia  Normal ECG  When compared with ECG of 23-Jul-2024 11:47,  No significant change was found  Read by me at 1214       Independent Interpretation   CXR: No pneumothorax or infiltrate.    ED Course      Medications Administered   Medications   alum & mag hydroxide-simethicone (MAALOX) suspension 15 mL (15 mLs Oral $Given 1/21/25 1247)   famotidine (PEPCID) injection 20 mg (20 mg Intravenous $Given 1/21/25 1240)       Procedures   Procedures     Discussion of Management   None    ED Course   ED Course as of 01/21/25 1532   Tue Jan 21, 2025   1222 I obtained history and examined the patient as noted above.    1515 I rechecked and updated the patient. Patient is feeling better. Experiencing mild periumbilical pain. Discussed risks and benefits of CT imaging.        Additional Documentation  None    Medical Decision Making / Diagnosis     CMS Diagnoses: None    MIPS       None    MDM   Nelsy Quinones is a 24 year old female presenting to the emergency department for evaluation of various concerns including chest pain, abdominal pain, and pelvic pain.  VS on presentation reveal no acute abnormalities and remained stable during her ED course.  With regards to patient's chest discomfort, broad differential considered including ACS, PE, pneumothorax, pneumonia, aortic dissection, musculoskeletal pain, atypical reflux, among others.  Given patient's burning sensation in her stomach, as well as some pain in her chest, GI pathology on the differential.  Following a GI cocktail her chest pain did improve.  EKG was obtained, demonstrating sinus rhythm, isolated T wave inversion in the anterior  precordial leads, though no other acute ST segment elevation or depression.  Her high-sensitivity troponin has returned undetectable and given duration of symptoms I feel cardiac ischemia to be unlikely.  PE also unlikely as patient is low risk utilizing Wells criteria and D-dimer returned within normal limits.  Chest x-ray negative for pneumothorax or pneumonia.  Given improved pain, reproducible tenderness with palpation over her chest wall, I feel this is unlikely represent aortic dissection and presently do feel that the risks of further advanced imaging with CT outweigh benefits.  With regards to patient's pelvic discomfort, a pelvic ultrasound was obtained.  This demonstrates morphologic findings suspicious for PCOS.  No current ultrasonographic evidence of ovarian torsion.  This was discussed with patient who was noting understanding of this and will follow-up closely with primary team to discuss this further.  Pregnancy test did return negative.  Urinalysis not suggestive of infection.  Wet prep positive for bacterial vaginosis, likely explaining the cloudy color discharge she noted.  She will be treated with Flagyl.  GC/Chlamydia testing obtained and currently pending.  On reassessment, results of the above studies were reviewed with patient.  She is noting improvement in symptoms.  Repeat abdominal exam with mild periumbilical pain, though no focal right lower quadrant pain or lower abdominal pain.  At this time, we discussed options for further evaluation including CT imaging, though acknowledging risks of radiation exposure, given reassuring exam and overall above workup, we have elected for conservative management and close monitoring of symptoms.  She is understanding of the reasons to return to the ED including worsening pain, fevers, vomiting, or any other concerns.  Patient is otherwise to follow-up closely with her primary team as an outpatient in the coming 2 to 3 days for recheck.  All questions  answered to patient prior to discharge.    Disposition   The patient was discharged.     Diagnosis     ICD-10-CM    1. Chest pain, unspecified type  R07.9       2. Pelvic pain  R10.2       3. Bacterial vaginosis  N76.0     B96.89            Discharge Medications   Discharge Medication List as of 1/21/2025  3:13 PM        START taking these medications    Details   famotidine (PEPCID) 20 MG tablet Take 1 tablet (20 mg) by mouth 2 times daily for 7 days., Disp-14 tablet, R-0, E-Prescribe      metroNIDAZOLE (FLAGYL) 500 MG tablet Take 1 tablet (500 mg) by mouth 2 times daily for 7 days., Disp-14 tablet, R-0, E-PrescribeEat yogurt or cottage cheese daily to prevent diarrhea that can be caused by taking this medication.               Scribe Disclosure:  I, Demetrice Vogel, am serving as a scribe at 12:49 PM on 1/21/2025 to document services personally performed by Freddy Arriaga MD based on my observations and the provider's statements to me.     I, Debby Savage, am serving as a scribe at 12:49 PM on 1/21/2025 to document services personally performed by Freddy Arriaga MD based on my observations and the provider's statements to me.        Freddy Arriaga MD  01/21/25 2030

## 2025-01-21 NOTE — DISCHARGE INSTRUCTIONS
Follow-up:  Please follow-up with your primary care provider in 2-3 days for re-evaluation and discussion of your visit to the emergency department today.    Home treatments:  Recommended home therapies include rest, fluids, Flagyl for treatment of BV, and close follow-up.    New prescriptions:  Metronidazole (do not take with alcohol)  Pepcid    Return precautions:  Warning signs which should prompt you to return to the ER include worsening pain, shortness of breath, fevers, abdominal pain, or any other new or troubling symptoms.  We are always happy to see you again.    Discharge Instructions  Chest Pain    You have been seen today for chest pain or discomfort.  At this time, your provider has found no signs that your chest pain is due to a serious or life-threatening condition, (or you have declined more testing and/or admission to the hospital). However, sometimes there is a serious problem that does not show up right away. Your evaluation today may not be complete and you may need further testing and evaluation.     Generally, every Emergency Department visit should have a follow-up clinic visit with either a primary or a specialty clinic/provider. Please follow-up as instructed by your emergency provider today.  Return to the Emergency Department if:  Your chest pain changes, gets worse, starts to happen more often, or comes with less activity.  You are newly short of breath.  You get very weak or tired.  You pass out or faint.  You have any new symptoms, like fever, cough, numb legs, or you cough up blood.  You have anything else that worries you.    Until you follow-up with your regular provider, please do the following:  Take one aspirin daily unless you have an allergy or are told not to by your provider.  If a stress test appointment has been made, go to the appointment.  If you have questions, contact your regular provider.  Follow-up with your regular provider/clinic as directed; this is very  important.    If you were given a prescription for medicine here today, be sure to read all of the information (including the package insert) that comes with your prescription.  This will include important information about the medicine, its side effects, and any warnings that you need to know about.  The pharmacist who fills the prescription can provide more information and answer questions you may have about the medicine.  If you have questions or concerns that the pharmacist cannot address, please call or return to the Emergency Department.       Remember that you can always come back to the Emergency Department if you are not able to see your regular provider in the amount of time listed above, if you get any new symptoms, or if there is anything that worries you.  Discharge Instructions  Abdominal Pain    Abdominal pain (belly pain) can be caused by many things. Your evaluation today does not show the exact cause for your pain. Your provider today has decided that it is unlikely your pain is due to a life threatening problem, or a problem requiring surgery or hospital admission. Sometimes those problems cannot be found right away, so it is very important that you follow up as directed.  Sometimes only the changes which occur over time allow the cause of your pain to be found.    Generally, every Emergency Department visit should have a follow-up clinic visit with either a primary or a specialty clinic/provider. Please follow-up as instructed by your emergency provider today. With abdominal pain, we often recommend very close follow-up, such as the following day.    ADULTS:  Return to the Emergency Department right away if:    You get an oral temperature above 102oF or as directed by your provider.  You have blood in your stools. This may be bright red or appear as black, tarry stools.    You keep vomiting (throwing up) or cannot drink liquids.  You see blood when you vomit.   You cannot have a bowel movement or  you cannot pass gas.  Your stomach gets bloated or bigger.  Your skin or the whites of your eyes look yellow.  You faint.  You have bloody, frequent or painful urination (peeing).  You have new symptoms or anything that worries you.    CHILDREN:  Return to the Emergency Department right away if your child has any of the above-listed symptoms or the following:    Pushes your hand away or screams/cries when his/her belly is touched.  You notice your child is very fussy or weak.  Your child is very tired and is too tired to eat or drink.  Your child is dehydrated.  Signs of dehydration can be:  Significant change in the amount of wet diapers/urine.  Your infant or child starts to have dry mouth and lips, or no saliva (spit) or tears.    PREGNANT WOMEN:  Return to the Emergency Department right away if you have any of the above-listed symptoms or the following:    You have bleeding, leaking fluid or passing tissue from the vagina.  You have worse pain or cramping, or pain in your shoulder or back.  You have vomiting that will not stop.  You have a temperature of 100oF or more.  Your baby is not moving as much as usual.  You faint.  You get a bad headache with or without eye problems and abdominal pain.  You have a seizure.  You have unusual discharge from your vagina and abdominal pain.    Abdominal pain is pretty common during pregnancy.  Your pain may or may not be related to your pregnancy. You should follow-up closely with your OB provider so they can evaluate you and your baby.  Until you follow-up with your regular provider, do the following:     Avoid sex and do not put anything in your vagina.  Drink clear fluids.  Only take medications approved by your provider.    MORE INFORMATION:    Appendicitis:  A possible cause of abdominal pain in any person who still has their appendix is acute appendicitis. Appendicitis is often hard to diagnose.  Testing does not always rule out early appendicitis or other causes of  "abdominal pain. Close follow-up with your provider and re-evaluations may be needed to figure out the reason for your abdominal pain.    Follow-up:  It is very important that you make an appointment with your clinic and go to the appointment.  If you do not follow-up with your primary provider, it may result in missing an important development which could result in permanent injury or disability and/or lasting pain.  If there is any problem keeping your appointment, call your provider or return to the Emergency Department.    Medications:  Take your medications as directed by your provider today.  Before using over-the-counter medications, ask your provider and make sure to take the medications as directed.  If you have any questions about medications, ask your provider.    Diet:  Resume your normal diet as much as possible, but do not eat fried, fatty or spicy foods while you have pain.  Do not drink alcohol or have caffeine.  Do not smoke tobacco.    Probiotics: If you have been given an antibiotic, you may want to also take a probiotic pill or eat yogurt with live cultures. Probiotics have \"good bacteria\" to help your intestines stay healthy. Studies have shown that probiotics help prevent diarrhea (loose stools) and other intestine problems (including C. diff infection) when you take antibiotics. You can buy these without a prescription in the pharmacy section of the store.     If you were given a prescription for medicine here today, be sure to read all of the information (including the package insert) that comes with your prescription.  This will include important information about the medicine, its side effects, and any warnings that you need to know about.  The pharmacist who fills the prescription can provide more information and answer questions you may have about the medicine.  If you have questions or concerns that the pharmacist cannot address, please call or return to the Emergency Department. "       Remember that you can always come back to the Emergency Department if you are not able to see your regular provider in the amount of time listed above, if you get any new symptoms, or if there is anything that worries you.

## 2025-01-22 LAB
C TRACH DNA SPEC QL PROBE+SIG AMP: NEGATIVE
N GONORRHOEA DNA SPEC QL NAA+PROBE: NEGATIVE
SPECIMEN TYPE: NORMAL

## 2025-02-05 ENCOUNTER — PATIENT OUTREACH (OUTPATIENT)
Dept: CARE COORDINATION | Facility: CLINIC | Age: 25
End: 2025-02-05
Payer: COMMERCIAL

## 2025-02-05 NOTE — PROGRESS NOTES
Clinic Care Coordination Contact  Follow Up Progress Note      Assessment: Called and spoke with pt.     Continue with job search. Had interview last week and waiting to hear back. Has applied for multiple jobs.   Catching up on rent.   Denied emergency assistance. Was told that she did not submit enough proof of payment. Declined FRW assistance and denied plan to re-apply at this time.   Approved for SNAP  Did not enroll in [employer]. Reviewed again and sent information, along with additional resources again today and via [employer].   Discussed last note from [facility]. Pt stated that she has wanted to reconnect with the [facility] but unsure how to schedule an apt. CC notified [facility] and provided pt with the number for the [facility] scheduling number to call. Offered to assist and pt politely declined.   CC offered to send additional mental health resources to pt. Pt expressed appreciation. Number for Mental Health Support on back on pt insurance card and a link with in-network mental health clinics sent via [employer].     Pt denied additional needs. CC will remain available and plan to follow-up with pt in 1 month. Encouraged pt to call in the meantime if additional needs arise.     Care Gaps:    Health Maintenance Due   Topic Date Due    DEPRESSION ACTION PLAN  Never done    HIV SCREENING  Never done    HEPATITIS C SCREENING  Never done     Care Plans  Care Plan: Transportation       Problem: Lack of transportation       Goal: Establish reliable transportation       Start Date: 12/6/2024 Expected End Date: 3/5/2025    This Visit's Progress: 50%    Note:     Barriers:  has a car, no license, limited transportation, cost of Lyft/Uber, inconsistent public transportation   Strengths: Engaged and motivated. Has/aware of transportation benefits with insurance.   Patient expressed understanding of goal: Yes    Action steps to achieve this goal:  1. I will continue to rely on my family and friends for assistance with  transportation (Ongoing)   2. I will review list of private pay transportation options send by care coordinator (Completed)   3. I will consider getting license? (Defer to future outreach)   4. I will reach out to care coordinator if/when needing assistance or support                               Care Plan: Food Insecurity       Problem: Reliable food source       Goal: Establish Options for Affordable Food Sources       Start Date: 12/6/2024 Expected End Date: 3/6/2025    This Visit's Progress: 50%    Note:     Barriers: Limited income and financial concerns  Strengths: Engaged and motivated. Applied for SNAP 12/05/24.   Patient expressed understanding of goal: Yes    Action steps to achieve this goal:  1. I will continue to rely on family and friends for support   2. I will follow up on application for SNAP (Completed - Approved)   3. I will review information for [employer], enroll, and utilize if interested (Ongoing - sent again 02/05/25)   4. I will reach out to care coordinator if/when needing assistance or support                               Care Plan: Mental Health       Problem: Mental Health Symptoms Need Improvement       Goal: Improve management of mental health symptoms and establish with mental health/psychosocial supports       Start Date: 12/6/2024 Expected End Date: 3/6/2025    This Visit's Progress: 50%    Note:     Barriers: Decreased mental health   Strengths: Engaged and motivated. Interested in therapy/counseling.   Patient expressed understanding of goal: Yes    Action steps to achieve this goal:  1. I will continue to rely on my family and friends for support   2. I will complete appt with [facility] on 12/20/24 and learn more about additional support and services (Complete - wanting another apt, [facility] notified 02/05/25)   3. I will establish with a therapist/counselor with the help of [facility] (Ongoing?)  4. I will review information about in-network mental health options per my insurance website and  sent to me by care coordinator (02/05: NEW)   5. I will reach out to care coordinator if/when needing assistance or support                               Care Plan: Financial Wellbeing       Problem: Patient expresses financial resource strain       Goal: Create an action plan to increase financial stability       Start Date: 12/6/2024 Expected End Date: 4/6/2025    This Visit's Progress: 50%    Note:     Barriers: Limited income and financial concerns. Recently quit second job to focus on mental health.   Strengths: Engaged and motivated.   Patient expressed understanding of goal: Yes    Action steps to achieve this goal:  1. I will follow-up on application for emergency and utility assistance (Completed - Denied and unsure about applying again)  2. I will ask care coordinator for FRW if additional assistance or support needed (Completed - referral closed, pt declined assistance)   3. I will review additional resources to help get clothes and [place] gifts for my kids and sent to me by care coordinator (Completed)   4. I will continue to apply and seek employment (02/05: New and ongoing)   5. I will reach out to care coordinator if/when needing assistance or support                               Intervention/Education provided during outreach: Check in support, resources, and contact information.     Outreach Frequency: monthly, more frequently as needed    Plan: Will check on care plan progression and assess for any additional needs or support.     Care Coordinator will follow up in 1 month.     DALE Leon  [facility]   Primary Care Social Work Care Coordinator  [place] & [place]   Phone: [phone]

## 2025-03-10 ENCOUNTER — PATIENT OUTREACH (OUTPATIENT)
Dept: CARE COORDINATION | Facility: CLINIC | Age: 25
End: 2025-03-10
Payer: COMMERCIAL

## 2025-03-10 NOTE — PROGRESS NOTES
Clinic Care Coordination Contact  [facility]/Voicemail    Clinical Data: Care Coordinator Outreach    Outreach Documentation Number of Outreach Attempt   3/10/2025  11:26 AM 1     Called and pt answered. ANICETO introduced self and call ended. ANICETO CC called back a minute later and left a  with call back information and requested a return call. ANICETO CC waited a few minutes and tried again and line rang and eventually went to .     Plan: Care Coordinator will try to reach patient again in 10 business days. Will defer sending an updated care plan until able to speak with pt and review care plan progression.     DALE Leon  [facility]   Primary Care Social Work Care Coordinator  [place], [place] & [place]   Phone: [phone]

## 2025-03-18 ENCOUNTER — HOSPITAL ENCOUNTER (EMERGENCY)
Facility: CLINIC | Age: 25
Discharge: HOME | End: 2025-03-18
Attending: EMERGENCY MEDICINE | Admitting: EMERGENCY MEDICINE
Payer: COMMERCIAL

## 2025-03-18 VITALS
OXYGEN SATURATION: 100 % | TEMPERATURE: 98.4 F | HEART RATE: 84 BPM | SYSTOLIC BLOOD PRESSURE: 122 MMHG | RESPIRATION RATE: 26 BRPM | DIASTOLIC BLOOD PRESSURE: 71 MMHG

## 2025-03-18 DIAGNOSIS — T78.40XA ALLERGIC REACTION, INITIAL ENCOUNTER: ICD-10-CM

## 2025-03-18 PROCEDURE — 250N000011 HC RX IP 250 OP 636: Performed by: EMERGENCY MEDICINE

## 2025-03-18 PROCEDURE — 96374 THER/PROPH/DIAG INJ IV PUSH: CPT

## 2025-03-18 PROCEDURE — 96372 THER/PROPH/DIAG INJ SC/IM: CPT | Performed by: EMERGENCY MEDICINE

## 2025-03-18 PROCEDURE — 99291 CRITICAL CARE FIRST HOUR: CPT | Mod: 25

## 2025-03-18 PROCEDURE — 96375 TX/PRO/DX INJ NEW DRUG ADDON: CPT

## 2025-03-18 PROCEDURE — 250N000009 HC RX 250: Performed by: EMERGENCY MEDICINE

## 2025-03-18 RX ORDER — PREDNISONE 20 MG/1
40 TABLET ORAL DAILY
Qty: 8 TABLET | Refills: 0 | Status: SHIPPED | OUTPATIENT
Start: 2025-03-19 | End: 2025-03-23

## 2025-03-18 RX ORDER — METHYLPREDNISOLONE SODIUM SUCCINATE 125 MG/2ML
125 INJECTION INTRAMUSCULAR; INTRAVENOUS ONCE
Status: COMPLETED | OUTPATIENT
Start: 2025-03-18 | End: 2025-03-18

## 2025-03-18 RX ORDER — DIPHENHYDRAMINE HYDROCHLORIDE 50 MG/ML
50 INJECTION, SOLUTION INTRAMUSCULAR; INTRAVENOUS ONCE
Status: COMPLETED | OUTPATIENT
Start: 2025-03-18 | End: 2025-03-18

## 2025-03-18 RX ADMIN — FAMOTIDINE 20 MG: 10 INJECTION, SOLUTION INTRAVENOUS at 19:10

## 2025-03-18 RX ADMIN — METHYLPREDNISOLONE SODIUM SUCCINATE 125 MG: 125 INJECTION, POWDER, FOR SOLUTION INTRAMUSCULAR; INTRAVENOUS at 19:10

## 2025-03-18 RX ADMIN — EPINEPHRINE 0.3 MG: 1 INJECTION, SOLUTION, CONCENTRATE INTRAVENOUS at 19:07

## 2025-03-18 RX ADMIN — DIPHENHYDRAMINE HYDROCHLORIDE 50 MG: 50 INJECTION, SOLUTION INTRAMUSCULAR; INTRAVENOUS at 19:10

## 2025-03-18 ASSESSMENT — ACTIVITIES OF DAILY LIVING (ADL)
ADLS_ACUITY_SCORE: 42
ADLS_ACUITY_SCORE: 42

## 2025-03-18 NOTE — ED TRIAGE NOTES
At [facility] now with thraot swelling difficulty swallowing. 99% on RA. Hyperventilating in triage. No known alleergies.

## 2025-03-19 NOTE — ED NOTES
Active discharge order noted, AVS education & prescriptions provided and reviewed with patient/spouse. No questions or concerns. AVSS, PIV removed. Wheeled out to lobby.

## 2025-03-19 NOTE — ED PROVIDER NOTES
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  The patient notes that she [place] pet[place] today, unaware if there were any guinea pigs there, and 30 minutes prior to arrival began to experience itching in her mouth and throat as well as shortness of breath.  She felt her skin to the anterior chest was itchy but did not notice rash.  Typically, with exposure to guinea pigs, she has mild symptoms compared to this which resolved without treatment.  She is unaware of any other allergies.        Independent Historian:   None     Review of External Notes:   Reviewed 3/6/2025 neurology visit and 11/29/2024 office visit for comprehensive view of past medical history    ROS:  Review of Systems    Allergies:  No Known Allergies     Medications:    levETIRAcetam (KEPPRA) 500 MG tablet        Past History:    Past Medical History:   Diagnosis Date    Anemia     Chlamydia     Seizure disorder          Physical Exam     Patient Vitals for the past 24 hrs:  Vitals:    03/18/25 1909 03/18/25 1930 03/18/25 1947 03/2000   BP:  125/88  120/74   Pulse:  82  79   Resp:  24  26   Temp:   98.4  F (36.9  C)    TempSrc:   Oral    SpO2: 100% 100%  100%         Physical Exam  Constitutional: Alert, attentive  HENT:    Nose normal.    Posterior pharynx shows no edema   Normal midline uvula   No peritonsillar erythema or swelling   No sublingual induration, swelling or tenderness   No trismus   Normal dentition without gingival swelling or caries   Able to extend neck without discomfort   Tolerating secretions and able to breathe supine with ease  Eyes: EOM are normal. Pupils are equal, round, and reactive to light.   CV: regular rate and rhythm; no murmurs, rubs or gallups  Chest: Effort normal and breath sounds normal.   GI:  There is no tenderness. No distension. Normal bowel  sounds  MSK: Normal range of motion.   Neurological: Alert, attentive  Skin: Skin is warm and dry.      Emergency Department Course       Emergency Department Course & Assessments:             Interventions:  Medications   EPINEPHrine (ADRENALIN) kit 0.3 mg (0.3 mg Subcutaneous $Given by Other 3/18/25 1907)   famotidine (PEPCID) injection 20 mg (20 mg Intravenous $Given 3/18/25 1910)   methylPREDNISolone Na Suc (solu-MEDROL) injection 125 mg (125 mg Intravenous $Given 3/18/25 1910)   diphenhydrAMINE (BENADRYL) injection 50 mg (50 mg Intravenous $Given 3/18/25 1910)       Disposition:  The patient was discharged to home.     Impression & Plan      Medical Decision Making:  This is a 24-year-old female who presents for evaluation of an allergic reaction with symptoms described as above.  She has symptoms concerning for possibly evolving anaphylaxis.  Symptoms are markedly improved after supportive cares as per above.  She was observed 2 hours to have no recurrence of symptoms.  Based on presentation, she is safe for discharge at this time.  Plan for more days of prednisone and Benadryl as needed.  Primary care follow-up in 3 to 5 days and return precautions for swelling, itching, or any other concerns.            Visit Diagnosis, Associated Orders, and Comments     ICD-10-CM    1. Allergic reaction, initial encounter  T78.40XA              Terry Juarez MD  03/19/25 0054

## 2025-03-19 NOTE — DISCHARGE INSTRUCTIONS
It was a pleasure taking care of you today. I hope you feel much better soon.  Take prednisone as prescribed for allergic reaction. Take benadryl as indicated on the bottle for itching.  Please follow-up with your primary care doctor in 1 week.  Return immediately for swelling, difficulty breathing, or any other concerns.

## 2025-04-16 ENCOUNTER — NURSE TRIAGE (OUTPATIENT)
Dept: INTERNAL MEDICINE | Facility: CLINIC | Age: 25
End: 2025-04-16
Payer: COMMERCIAL

## 2025-04-16 NOTE — TELEPHONE ENCOUNTER
"Pt agreed to Disposition - to see UC NOW - pt expressed not being interested in ED at this time, and Triage was able to review nearby options with pt that suit her needs. Triage was also able to schedule pt with PCP for the following:    Apr 21, 2025 4:30 PM  (Arrive by 4:10 PM)  Provider Visit with Shannan Sebastian CNP  [facility] ([facility] ) [phone]          Dee Dee Garduno RN  ~~~~~~~~~~~~~~~~~~~~~~~~~~~~~~    1. LOCATION: \"Where does it hurt?\"        Center   2. RADIATION: \"Does the pain go anywhere else?\" (e.g., into neck, jaw, arms, back)      Denies  3. ONSET: \"When did the chest pain begin?\" (Minutes, hours or days)       3 weeks ago  4. PATTERN: \"Does the pain come and go, or has it been constant since it started?\"  \"Does it get worse with exertion?\"       Constant but worsens when taking deep breath  5. DURATION: \"How long does it last\" (e.g., seconds, minutes, hours)      Approx. 1-2hr   6. SEVERITY: \"How bad is the pain?\"  (e.g., Scale 1-10; mild, moderate, or severe)     - MILD (1-3): doesn't interfere with normal activities      - MODERATE (4-7): interferes with normal activities or awakens from sleep     - SEVERE (8-10): excruciating pain, unable to do any normal activities        4/10, achy  7. CARDIAC RISK FACTORS: \"Do you have any history of heart problems or risk factors for heart disease?\" (e.g., angina, prior heart attack; diabetes, high blood pressure, high cholesterol, smoker, or strong family history of heart disease)      Denies  8. PULMONARY RISK FACTORS: \"Do you have any history of lung disease?\"  (e.g., blood clots in lung, asthma, emphysema, birth control pills)      Denies  9. CAUSE: \"What do you think is causing the chest pain?\"      Recent covid infection (last week), hx acid reflux in pregnancy  10. OTHER SYMPTOMS: \"Do you have any other symptoms?\" (e.g., dizziness, nausea, vomiting, sweating, fever, difficulty breathing, " "cough)        Headaches, nausea, brain fog  11. PREGNANCY: \"Is there any chance you are pregnant?\" \"When was your last menstrual period?\"        Recently tested negative, but LMP over a month ago (week late)    Reason for Disposition   Chest pain or 'angina' comes and goes and is happening more often (increasing in frequency) or getting worse (increasing in severity) (Exception: Chest pains that last only a few seconds.)    Additional Information   Negative: SEVERE difficulty breathing (e.g., struggling for each breath, speaks in single words)   Negative: Difficult to awaken or acting confused (e.g., disoriented, slurred speech)   Negative: Shock suspected (e.g., cold/pale/clammy skin, too weak to stand, low BP, rapid pulse)   Negative: Passed out (i.e., lost consciousness, collapsed and was not responding)   Negative: Chest pain lasting longer than 5 minutes and ANY of the following:         Pain is crushing, pressure-like, or heavy         Over 44 years old          Over 30 years old and one cardiac risk factor (e.g diabetes, high blood pressure, high cholesterol, smoker, or family history of heart disease)         History of heart disease (e.g. angina, heart attack, heart failure, bypass surgery, takes nitroglycerin)   Negative: Heart beating < 50 beats per minute OR > 140 beats per minute   Negative: Visible sweat on face or sweat dripping down face   Negative: Sounds like a life-threatening emergency to the triager   Negative: Followed an injury to chest   Negative: SEVERE chest pain   Negative: Pain also in shoulder(s) or arm(s) or jaw   Negative: Difficulty breathing   Negative: Cocaine use within last 3 days   Negative: Major surgery in the past month   Negative: Hip or leg fracture (broken bone) in past month (or had cast on leg or ankle in past month)   Negative: Illness requiring prolonged bedrest in past month (e.g., immobilization, long hospital stay)   Negative: Long-distance travel in past month " (e.g., car, bus, train, plane; with trip lasting 6 or more hours)   Negative: History of prior 'blood clot' in leg or lungs (i.e., deep vein thrombosis, pulmonary embolism)   Negative: History of inherited increased risk of blood clots (e.g., Factor 5 Leiden, Anti-thrombin 3, Protein C or Protein S deficiency, Prothrombin mutation)   Negative: Cancer treatment in the past two months (or has cancer now)   Negative: Heart beating irregularly or very rapidly   Negative: Chest pain lasting longer than 5 minutes and occurred in last 3 days (72 hours) (Exception: Feels exactly the same as previously diagnosed heartburn and has accompanying sour taste in mouth.)    Protocols used: Chest Pain-A-OH

## 2025-04-16 NOTE — TELEPHONE ENCOUNTER
Patient Contact    Attempt # 2    Was call answered?  No.  Left message on voicemail with information to call me back.

## 2025-04-16 NOTE — TELEPHONE ENCOUNTER
"Per Dr Mancilla- pt made an appt with me for tomorrow via [employer] for \"Chest discomfort ongoing headache nausea brain fog\"  Could you call and triage her?     Will pend to try to call back later.     Patient Contact    Attempt # 1    Was call answered?  No.  Left message on voicemail with information to call me back.   "

## 2025-06-09 ENCOUNTER — TELEPHONE (OUTPATIENT)
Dept: OBGYN | Facility: CLINIC | Age: 25
End: 2025-06-09
Payer: COMMERCIAL

## 2025-06-09 NOTE — TELEPHONE ENCOUNTER
Patient has been placed on the wait list to receive a text if a sooner appointment opens.     Seamus Gutierrez, CMA

## 2025-06-09 NOTE — TELEPHONE ENCOUNTER
[facility] Call Center    Phone Message    May a detailed message be left on voicemail: yes     Reason for Call: Patient is scheduled for 7/28/25 for [facility] appt, which is 14w for her. Please reach out if something is avail sooner, Thank you!    Action Taken: Message routed to:  Other: [facility]    Travel Screening: Not Applicable     Date of Service:

## 2025-06-17 ENCOUNTER — HOSPITAL ENCOUNTER (EMERGENCY)
Facility: CLINIC | Age: 25
Discharge: HOME | End: 2025-06-17
Attending: EMERGENCY MEDICINE | Admitting: EMERGENCY MEDICINE

## 2025-06-17 ENCOUNTER — NURSE TRIAGE (OUTPATIENT)
Dept: INTERNAL MEDICINE | Facility: CLINIC | Age: 25
End: 2025-06-17

## 2025-06-17 ENCOUNTER — VIRTUAL VISIT (OUTPATIENT)
Dept: OBGYN | Facility: CLINIC | Age: 25
End: 2025-06-17

## 2025-06-17 ENCOUNTER — TELEPHONE (OUTPATIENT)
Dept: OBGYN | Facility: CLINIC | Age: 25
End: 2025-06-17

## 2025-06-17 ENCOUNTER — APPOINTMENT (OUTPATIENT)
Dept: ULTRASOUND IMAGING | Facility: CLINIC | Age: 25
End: 2025-06-17
Attending: EMERGENCY MEDICINE

## 2025-06-17 VITALS — WEIGHT: 190 LBS | HEIGHT: 64 IN | BODY MASS INDEX: 32.44 KG/M2

## 2025-06-17 VITALS
DIASTOLIC BLOOD PRESSURE: 52 MMHG | TEMPERATURE: 98 F | SYSTOLIC BLOOD PRESSURE: 153 MMHG | OXYGEN SATURATION: 100 % | WEIGHT: 194 LBS | BODY MASS INDEX: 33.12 KG/M2 | RESPIRATION RATE: 20 BRPM | HEART RATE: 90 BPM | HEIGHT: 64 IN

## 2025-06-17 DIAGNOSIS — Z34.81 ENCOUNTER FOR SUPERVISION OF OTHER NORMAL PREGNANCY IN FIRST TRIMESTER: Primary | ICD-10-CM

## 2025-06-17 DIAGNOSIS — R11.0 NAUSEA: ICD-10-CM

## 2025-06-17 DIAGNOSIS — O23.41 URINARY TRACT INFECTION IN MOTHER DURING FIRST TRIMESTER OF PREGNANCY: ICD-10-CM

## 2025-06-17 DIAGNOSIS — O46.8X1 SUBCHORIONIC HEMATOMA IN FIRST TRIMESTER, SINGLE OR UNSPECIFIED FETUS: ICD-10-CM

## 2025-06-17 DIAGNOSIS — O20.0 THREATENED MISCARRIAGE IN EARLY PREGNANCY: ICD-10-CM

## 2025-06-17 DIAGNOSIS — O41.8X10 SUBCHORIONIC HEMATOMA IN FIRST TRIMESTER, SINGLE OR UNSPECIFIED FETUS: ICD-10-CM

## 2025-06-17 LAB
ABO + RH BLD: NORMAL
ALBUMIN UR-MCNC: 100 MG/DL
ANION GAP SERPL CALCULATED.3IONS-SCNC: 12 MMOL/L (ref 7–15)
APPEARANCE UR: ABNORMAL
BACTERIA #/AREA URNS HPF: ABNORMAL /HPF
BASOPHILS # BLD AUTO: 0 10E3/UL (ref 0–0.2)
BASOPHILS NFR BLD AUTO: 0 %
BILIRUB UR QL STRIP: NEGATIVE
BLD GP AB SCN SERPL QL: NEGATIVE
BUN SERPL-MCNC: 8.3 MG/DL (ref 6–20)
CALCIUM SERPL-MCNC: 8.9 MG/DL (ref 8.8–10.4)
CHLORIDE SERPL-SCNC: 104 MMOL/L (ref 98–107)
COLOR UR AUTO: YELLOW
CREAT SERPL-MCNC: 0.7 MG/DL (ref 0.51–0.95)
EGFRCR SERPLBLD CKD-EPI 2021: >90 ML/MIN/1.73M2
EOSINOPHIL # BLD AUTO: 0.1 10E3/UL (ref 0–0.7)
EOSINOPHIL NFR BLD AUTO: 2 %
ERYTHROCYTE [DISTWIDTH] IN BLOOD BY AUTOMATED COUNT: 15.2 % (ref 10–15)
GLUCOSE SERPL-MCNC: 89 MG/DL (ref 70–99)
GLUCOSE UR STRIP-MCNC: NEGATIVE MG/DL
HCG INTACT+B SERPL-ACNC: ABNORMAL MIU/ML
HCO3 SERPL-SCNC: 21 MMOL/L (ref 22–29)
HCT VFR BLD AUTO: 37.5 % (ref 35–47)
HGB BLD-MCNC: 12.1 G/DL (ref 11.7–15.7)
HGB UR QL STRIP: ABNORMAL
IMM GRANULOCYTES # BLD: 0 10E3/UL
IMM GRANULOCYTES NFR BLD: 0 %
KETONES UR STRIP-MCNC: 100 MG/DL
LEUKOCYTE ESTERASE UR QL STRIP: ABNORMAL
LYMPHOCYTES # BLD AUTO: 1.7 10E3/UL (ref 0.8–5.3)
LYMPHOCYTES NFR BLD AUTO: 21 %
MCH RBC QN AUTO: 26.3 PG (ref 26.5–33)
MCHC RBC AUTO-ENTMCNC: 32.3 G/DL (ref 31.5–36.5)
MCV RBC AUTO: 82 FL (ref 78–100)
MONOCYTES # BLD AUTO: 0.5 10E3/UL (ref 0–1.3)
MONOCYTES NFR BLD AUTO: 7 %
MUCOUS THREADS #/AREA URNS LPF: PRESENT /LPF
NEUTROPHILS # BLD AUTO: 5.6 10E3/UL (ref 1.6–8.3)
NEUTROPHILS NFR BLD AUTO: 70 %
NITRATE UR QL: NEGATIVE
NRBC # BLD AUTO: 0 10E3/UL
NRBC BLD AUTO-RTO: 0 /100
PH UR STRIP: 6 [PH] (ref 5–7)
PLATELET # BLD AUTO: 244 10E3/UL (ref 150–450)
POTASSIUM SERPL-SCNC: 3.8 MMOL/L (ref 3.4–5.3)
RBC # BLD AUTO: 4.6 10E6/UL (ref 3.8–5.2)
RBC URINE: >182 /HPF
SODIUM SERPL-SCNC: 137 MMOL/L (ref 135–145)
SP GR UR STRIP: 1.03 (ref 1–1.03)
SPECIMEN EXP DATE BLD: NORMAL
SQUAMOUS EPITHELIAL: 14 /HPF
UROBILINOGEN UR STRIP-MCNC: NORMAL MG/DL
WBC # BLD AUTO: 8 10E3/UL (ref 4–11)
WBC CLUMPS #/AREA URNS HPF: PRESENT /HPF
WBC URINE: >182 /HPF

## 2025-06-17 PROCEDURE — 76801 OB US < 14 WKS SINGLE FETUS: CPT

## 2025-06-17 PROCEDURE — 85004 AUTOMATED DIFF WBC COUNT: CPT | Performed by: EMERGENCY MEDICINE

## 2025-06-17 PROCEDURE — 86901 BLOOD TYPING SEROLOGIC RH(D): CPT | Performed by: EMERGENCY MEDICINE

## 2025-06-17 PROCEDURE — 99207 PR NO CHARGE NURSE ONLY: CPT

## 2025-06-17 PROCEDURE — 99285 EMERGENCY DEPT VISIT HI MDM: CPT | Mod: 25

## 2025-06-17 PROCEDURE — 81001 URINALYSIS AUTO W/SCOPE: CPT | Performed by: EMERGENCY MEDICINE

## 2025-06-17 PROCEDURE — 80048 BASIC METABOLIC PNL TOTAL CA: CPT | Performed by: EMERGENCY MEDICINE

## 2025-06-17 PROCEDURE — 84702 CHORIONIC GONADOTROPIN TEST: CPT | Performed by: EMERGENCY MEDICINE

## 2025-06-17 PROCEDURE — 85025 COMPLETE CBC W/AUTO DIFF WBC: CPT | Performed by: EMERGENCY MEDICINE

## 2025-06-17 PROCEDURE — 36415 COLL VENOUS BLD VENIPUNCTURE: CPT | Performed by: EMERGENCY MEDICINE

## 2025-06-17 PROCEDURE — 87186 SC STD MICRODIL/AGAR DIL: CPT | Performed by: EMERGENCY MEDICINE

## 2025-06-17 RX ORDER — CEPHALEXIN 500 MG/1
500 CAPSULE ORAL 2 TIMES DAILY
Qty: 14 CAPSULE | Refills: 0 | Status: SHIPPED | OUTPATIENT
Start: 2025-06-17 | End: 2025-06-24

## 2025-06-17 RX ORDER — ONDANSETRON 4 MG/1
4 TABLET, ORALLY DISINTEGRATING ORAL EVERY 8 HOURS PRN
Qty: 10 TABLET | Refills: 0 | Status: SHIPPED | OUTPATIENT
Start: 2025-06-17

## 2025-06-17 ASSESSMENT — ACTIVITIES OF DAILY LIVING (ADL)
ADLS_ACUITY_SCORE: 42
ADLS_ACUITY_SCORE: 42

## 2025-06-17 NOTE — PROGRESS NOTES
NPN nurse visit done over the phone. Pt will be given NPN folder and book at her upcoming appt.  Discussed optional screening available to assess chromosomal anomalies. Questions answered. Informed pt of the clinic structure (on-call does deliveries for the day, may be male or female doctor). Pt advised to call the clinic if she has any questions or concerns related to her pregnancy. Prenatal labs will be obtained at her upcoming appt. New prenatal visit scheduled on 7/15/25 with Dr Swain.    6w3d        Was in ED on  for vaginal spotting. US done. JAY seen. Also had some of the new prenatal labs drawn. Orders placed for any additonal NPN labs that were not done in ED and pt will have these done after first provider visit.   Dx with UTI at ED visit, Rx Keflex and Zofran.    Menstrual cycles: irregular cycles over the last year. Would skip months sometimes. Bleeding heavier then normal.   Date of positive pregnancy test: 25  Medications stopped upon pos HPT: none    Last pap: 23 ASC-US, pt states she has not had follow up for this result. Result in CE.        Patient supplied answers from flow sheet for:  Prenatal OB Questionnaire.  Past Medical History  Have you ever recieved care for your mental health? : (!) Yes (, was on 72 hour hold for SI)  Have you ever been in a major accident or suffered serious trauma?: No  Within the last year, has anyone hit, slapped, kicked or otherwise hurt you?: No  In the last year, has anyone forced you to have sex when you didn't want to?: No    Past Medical History 2   Have you ever received a blood transfusion?: No  Would you accept a blood transfusion if was medically recommended?: Yes  Does anyone in your home smoke?: No   Is your blood type Rh negative?: No  Have you ever ?: (!) Yes  Have you been hospitalized for a nonsurgical reason excluding normal delivery?: No  Have you ever had an abnormal pap smear?: (!) Yes    Past Medical History  (Continued)  Do you have a history of abnormalities of the uterus?: No  Did your mother take RAMBO or any other hormones when she was pregnant with you?: Unknown  Do you have any other problems we have not asked about which you feel may be important to this pregnancy?: No     Manuela DALTON RN  [facility]

## 2025-06-17 NOTE — ED TRIAGE NOTES
Pt here for lower abdominal pain and cramping. Estimates she is about 8-9 weeks pregnant. Endorses pressure in her bladder even after voiding. Also endorses low back pain and mild cramping. Noticed pink blood when wiping today. Also mentions malodorous cloudy vaginal discharge. Denies itching, burning, dysuria, or concerns for STDs. This is her 3rd pregnancy. Has not been seen for prenatal care yet.

## 2025-06-17 NOTE — TELEPHONE ENCOUNTER
Pt in early pregnancy.    Calls with light spotting. Some mild cramping. [place] a couple days ago.    Moved ultrasound to tomorrow.     Mentions sx of uti. No appts today.    Advised [facility] or [facility] if she wants to address that today.    Carly MATHEWS RN BSN  [facility]

## 2025-06-17 NOTE — DISCHARGE INSTRUCTIONS
There is a small amount of bleeding inside the uterus called a subchorionic hematoma.  This is some bleeding that can happen between where the pregnancy is located in the uterine wall.  It is important that you continue to follow-up with your OB/GYN and will continue to follow this with future ultrasounds.  If your bleeding were to worsen, you develop worsening pain, develop fever, or soaking more than 1 pad per hour return immediately to the emergency department.

## 2025-06-17 NOTE — TELEPHONE ENCOUNTER
Pt calls stating she is about 9 weeks pregnant.   She starting having light cramping and low back pain yesterday.     Also having pelvic pressure, thought she had UTI.     Today had some blood spotting, pink tinged, with wiping after urination.     Mild cramping and LBP today.     NO US done. Scheduled on 6/19.     Doesn't feel weak or lightheaded. No fever.     PT states pain has been little over 24 hours.     Call to OBGYN  and they are going to try and get pt in today with RN to discuss further. Transferred pt to OBGYN.       Reason for Disposition   Intermittent lower abdominal pain (e.g., cramping) lasting > 24 hours    Additional Information   Negative: Shock suspected (e.g., cold/pale/clammy skin, too weak to stand, low BP, rapid pulse)   Negative: Difficult to awaken or acting confused (e.g., disoriented, slurred speech)   Negative: Passed out (e.g., fainted, lost consciousness, blacked out and was not responding)   Negative: Sounds like a life-threatening emergency to the triager   Negative: Vaginal bleeding and pregnant 20 or more weeks   Negative: Not pregnant or pregnancy status unknown   Negative: SEVERE vaginal bleeding (e.g., soaking 2 pads or tampons per hour and present 2 or more hours; 1 menstrual cup every 2 hours)   Negative: SEVERE abdominal pain (e.g., excruciating)   Negative: SEVERE dizziness (e.g., unable to stand, requires support to walk, feels like passing out)   Negative: Passed tissue (e.g., gray-white)   Negative: Shoulder pain   Negative: Constant abdominal pain lasting > 2 hours   Negative: Fever 100.4 F (38.0 C) or higher   Negative: Pale skin (pallor) of new-onset or getting worse   Negative: Patient sounds very sick or weak to the triager   Negative: MODERATE vaginal bleeding (e.g., soaking 1 pad or tampon per hour and present > 6 hours; 1 menstrual cup every 6 hours)   Negative: MILD vaginal bleeding (i.e., less than 1 pad / hour; less than patient's usual menstrual  bleeding; not just spotting) and pregnant > 12 weeks    Protocols used: Pregnancy - Vaginal Bleeding Less Than 20 Weeks EGA-A-[place]

## 2025-06-17 NOTE — ED PROVIDER NOTES
"  Emergency Department Note      History of Present Illness     Chief Complaint   Pregnancy Complications and Abdominal Pain      HPI   Nelsy Quinones is a 24 year old  female who presents to the ED with her family for evaluation of pregnancy complications. The patient reports that about 2 days ago she noticed the onset of increased urinary frequency. She adds that it feels like her bladder is always full, kralie after urination. She states that yesterday she had the onset of pressure on her bladder and cramping in her abdomen with minor back pain. She states that earlier today she had continuous abdominal cramps. This morning she states that she noticed spotting when she wipes after urination. She endorses that the spotting was more prominent when she urinated at the ED. She denies any passage of tissue. She also endorses nausea and states that she has been unable to eat anything besides toast and popsicles for the last few days. She denies vomiting. She states that she has not taken any pain or nausea medications. She adds that during her first pregnancy she took Zofran but it only made the nausea worse. Patient reports that she is about 9 weeks pregnant based on her last menstrual period. Patient has her first schedule appointment and ultrasound coming up in the next few days.     Independent Historian   None    Review of External Notes   None    Past Medical History     Medical History and Problem List   Anemia   Chlamydia   Seizure disorder during pregnancy   Mood disorder   Depression   Edema in pregnancy 3rd trimester   Hyperopia   Estropia - left eye     Medications   Keppra   Albuterol   Robitussin   Provera   Prenatal 19 29 mg iron - 1 mg chew     Surgical History   The patient has no pertinent past surgical history.     Physical Exam     Patient Vitals for the past 24 hrs:   BP Temp Temp src Pulse Resp SpO2 Height Weight   25 1159 (!) 153/52 98  F (36.7  C) Temporal 90 20 100 % 1.626 m (5' 4\") " 88 kg (194 lb)     Physical Exam  General: Alert, appears well-developed and well-nourished. Cooperative.     In mild distress  HEENT:  Head:  Atraumatic  Ears:  External ears are normal  Mouth/Throat:  Oropharynx is without erythema or exudate and mucous membranes are moist.   Eyes:   Conjunctivae normal and EOM are normal. No scleral icterus.  CV:  Normal rate, regular rhythm, normal heart sounds and radial pulses are 2+ and symmetric.  No murmur.  Resp:  Breath sounds are clear bilaterally    Non-labored, no retractions or accessory muscle use  GI:  Abdomen is soft, no distension, no tenderness. No rebound or guarding.  No CVA tenderness bilaterally  Pelvic:  Deferred  MS:  Normal range of motion. No edema.    Normal strength in all 4 extremities.     Back atraumatic.    No midline cervical, thoracic, or lumbar tenderness  Skin:  Warm and dry.  No rash or lesions noted.  Neuro:   Alert. Normal strength.  GCS: 15  Psych: Normal mood and affect.    Diagnostics     Lab Results   Labs Ordered and Resulted from Time of ED Arrival to Time of ED Departure   ROUTINE UA WITH MICROSCOPIC REFLEX TO CULTURE - Abnormal       Result Value    Color Urine Yellow      Appearance Urine Slightly Cloudy (*)     Glucose Urine Negative      Bilirubin Urine Negative      Ketones Urine 100 (*)     Specific Gravity Urine 1.028      Blood Urine Large (*)     pH Urine 6.0      Protein Albumin Urine 100 (*)     Urobilinogen Urine Normal      Nitrite Urine Negative      Leukocyte Esterase Urine Large (*)     Bacteria Urine Few (*)     WBC Clumps Urine Present (*)     Mucus Urine Present (*)     RBC Urine >182 (*)     WBC Urine >182 (*)     Squamous Epithelials Urine 14 (*)    BASIC METABOLIC PANEL - Abnormal    Sodium 137      Potassium 3.8      Chloride 104      Carbon Dioxide (CO2) 21 (*)     Anion Gap 12      Urea Nitrogen 8.3      Creatinine 0.70      GFR Estimate >90      Calcium 8.9      Glucose 89     HCG QUANTITATIVE PREGNANCY -  Abnormal    hCG Quantitative 25,953 (*)    CBC WITH PLATELETS AND DIFFERENTIAL - Abnormal    WBC Count 8.0      RBC Count 4.60      Hemoglobin 12.1      Hematocrit 37.5      MCV 82      MCH 26.3 (*)     MCHC 32.3      RDW 15.2 (*)     Platelet Count 244      % Neutrophils 70      % Lymphocytes 21      % Monocytes 7      % Eosinophils 2      % Basophils 0      % Immature Granulocytes 0      NRBCs per 100 WBC 0      Absolute Neutrophils 5.6      Absolute Lymphocytes 1.7      Absolute Monocytes 0.5      Absolute Eosinophils 0.1      Absolute Basophils 0.0      Absolute Immature Granulocytes 0.0      Absolute NRBCs 0.0     TYPE AND SCREEN, ADULT    ABO/RH(D) A POS      Antibody Screen Negative      SPECIMEN EXPIRATION DATE 6/20/2025 11:59:00 PM CDT     URINE CULTURE   ABO/RH TYPE AND SCREEN       Imaging   OB  US 1st trimester w transvag   Final Result   IMPRESSION:    1.  Single intrauterine gestation with cardiac activity at 6 weeks 3 days, with EDC of 2/7/2026.   2.  Subchorionic hemorrhage is suggested measuring up to 1.6 cm.                EKG   None     Independent Interpretation   None    ED Course      Medications Administered   Medications - No data to display    Procedures   Procedures     Discussion of Management   None    ED Course   ED Course as of 06/17/25 1745   Tue Jun 17, 2025   1357 I obtained history and examined the patient as noted above.    1447 I rechecked and updated the patient.        Additional Documentation  None    Medical Decision Making / Diagnosis     CMS Diagnoses: None    MIPS   None               MDM   Nelsy Quinones is a 24 year old female who presents for evaluation of lower cramping, urinary frequency and spotting.  The workup here shows threatened miscarriage, possible UTI, and nausea in first trimester.  There is a subchorionic hemorrhage.   I considered a broad differential including ovarian cyst, UTI, pyelonephritis, subchorionic hemorrhage, uterine bleeding, active  miscarriage, constipation, etc.  More rare but serious etiologies considered included ectopic pregnancy, appendicitis, cholecystitis, volvulus, intraabdominal abscess, heterotopic pregnancy, etc. given ongoing spotting and contaminated urinalysis we will plan to treat for possible urinary tract infection in early pregnancy.  Will prescribe course of Keflex and culture urine at this time.  Additionally the patient's been having some mild nausea but no active pain on examination.  Will trial Zofran in the outpatient setting for ongoing nausea in first trimester.  Supportive outpatient management indicated for threatened miscarriage today.  Encouraged to return to the emergency department if she develops heavier bleeding with soaking more than 1 pad per hour, increasing pain, development of fever or any new concerning symptom onset.  She will follow-up with OB/GYN to further discuss subchorionic hematoma and to establish for this pregnancy for ongoing prenatal cares.  After all questions answered return precautions understood, discharged home.    Disposition   The patient was discharged.     Diagnosis     ICD-10-CM    1. Threatened miscarriage in early pregnancy  O20.0       2. Nausea  R11.0       3. Urinary tract infection in mother during first trimester of pregnancy  O23.41       4. Subchorionic hematoma in first trimester, single or unspecified fetus  O41.8X10     O46.8X1            Discharge Medications   Discharge Medication List as of 6/17/2025  3:36 PM        START taking these medications    Details   cephALEXin (KEFLEX) 500 MG capsule Take 1 capsule (500 mg) by mouth 2 times daily for 7 days., Disp-14 capsule, R-0, E-Prescribe      ondansetron (ZOFRAN ODT) 4 MG ODT tab Take 1 tablet (4 mg) by mouth every 8 hours as needed for nausea or vomiting., Disp-10 tablet, R-0, E-Prescribe               Scribe Disclosure:  Juvenal CAVAZOS, am serving as a scribe at 1:54 PM on 6/17/2025 to document services personally  performed by Renzo Ferrer MD based on my observations and the provider's statements to me.        Renzo Ferrer MD  06/17/25 2273

## 2025-06-17 NOTE — PATIENT INSTRUCTIONS
Learning About Pregnancy  Your Care Instructions     Your health in the early weeks of your pregnancy is particularly important for your baby's health. Take good care of yourself. Anything you do that harms your body can also harm your baby.  Make sure to go to all of your doctor appointments. Regular checkups will help keep you and your baby healthy.  How can you care for yourself at home?  Diet    Choose healthy foods like fruits, vegetables, whole grains, lean proteins, and healthy fats.     Choose foods that are good sources of calcium, iron, and folate. You can try dairy products, dark leafy greens, fortified orange juice and cereals, almonds, broccoli, dried fruit, and beans.     Do not skip meals or go for many hours without eating. If you are nauseated, try to eat a small, healthy snack every 2 to 3 hours.     Avoid fish that are high in mercury. These include shark, swordfish, alejandro mackerel, marlin, orange roughy, and bigeye tuna, as well as tilefish from the [facility].     It's okay to eat up to 8 to 12 ounces a week of fish that are low in mercury or up to 4 ounces a week of fish that have medium levels of mercury. Some fish that are low in mercury are salmon, shrimp, canned light tuna, cod, and tilapia. Some fish that have medium levels of mercury are halibut and white albacore tuna.     Drink plenty of fluids. If you have kidney, heart, or liver disease and have to limit fluids, talk with your doctor before you increase the amount of fluids you drink.     Limit caffeine to about 200 to 300 mg per day. On average, a cup of brewed coffee has around 80 to 100 mg of caffeine.     Do not drink alcohol, such as beer, wine, or hard liquor.     Take a multivitamin that contains at least 400 micrograms (mcg) of folic acid to help prevent birth defects. Fortified cereal and whole wheat bread are good additional sources of folic acid.     Increase the calcium in your diet. Try to drink a quart of skim milk  each day. You may also take calcium supplements and choose foods such as cheese and yogurt.   Lifestyle    Make sure you go to your follow-up appointments.     Get plenty of rest. You may be unusually tired while you are pregnant.     Get at least 30 minutes of exercise on most days of the week. Walking is a good choice. If you have not exercised in the past, start out slowly. Take several short walks each day.     Do not smoke. If you need help quitting, talk to your doctor about stop-smoking programs. These can increase your chances of quitting for good.     Do not touch cat feces or litter boxes. Also, wash your hands after you handle raw meat, and fully cook all meat before you eat it. Wear gloves when you work in the yard or garden, and wash your hands well when you are done. Cat feces, raw or undercooked meat, and contaminated dirt can cause an infection that may harm your baby or lead to a miscarriage.     Avoid things that can make your body too hot and may be harmful to your baby, such as a hot tub or sauna. Or talk with your doctor before doing anything that raises your body temperature. Your doctor can tell you if it's safe.     Avoid chemical fumes, paint fumes, or poisons.     Do not use illegal drugs, marijuana, or alcohol.   Medicines    Review all of your medicines with your doctor. Some of your routine medicines may need to be changed to protect your baby.     Use acetaminophen (Tylenol) to relieve minor problems, such as a mild headache or backache or a mild fever with cold symptoms. Do not use nonsteroidal anti-inflammatory drugs (NSAIDs), such as ibuprofen (Advil, Motrin) or naproxen (Aleve), unless your doctor says it is okay.     Do not take two or more pain medicines at the same time unless the doctor told you to. Many pain medicines have acetaminophen, which is Tylenol. Too much acetaminophen (Tylenol) can be harmful.     Take your medicines exactly as prescribed. Call your doctor if you  "think you are having a problem with your medicine.   To manage morning sickness    Keep food in your stomach, but not too much at once. Try eating five or six small meals a day instead of three large meals.     For nausea when you wake up, eat a small snack, such as a couple of crackers or pretzels, before rising. Allow a few minutes for your stomach to settle before you slowly get up.     Try to avoid smells and foods that make you feel nauseated. High-fat or greasy foods, milk, and coffee may make nausea worse. Some foods that may be easier to tolerate include cold, spicy, sour, and salty foods.     Drink enough fluids. Water and other caffeine-free drinks are good choices.     Take your prenatal vitamins at night on a full stomach.     Try foods and drinks made with mulu. Mulu may help with nausea.     Get lots of rest. Morning sickness may be worse when you are tired.     Talk to your doctor about over-the-counter products, such as vitamin B6 or doxylamine, to help relieve symptoms.     Try a P6 acupressure wrist band. These anti-nausea wristbands help some people.   Follow-up care is a key part of your treatment and safety. Be sure to make and go to all appointments, and call your doctor if you are having problems. It's also a good idea to know your test results and keep a list of the medicines you take.  Where can you learn more?  Go to [email or web address]  Enter E868 in the search box to learn more about \"Learning About Pregnancy.\"  Current as of: April 30, 2024  Content Version: 14.5    5602-9433 [email or web address].   Care instructions adapted under license by your healthcare professional. If you have questions about a medical condition or this instruction, always ask your healthcare professional. [email or web address] disclaims any warranty or liability for your use of this information.    Weeks 6 to 10 of Your Pregnancy: Care Instructions  During these weeks of pregnancy, your " "body goes through many changes. You may start to feel different, both in your body and your emotions. Each pregnancy is different, so there's no \"right\" way to feel. These early weeks are a time to make healthy choices for you and your pregnancy.    Take a daily prenatal vitamin. Choose one with folic acid in it.   Avoid alcohol, tobacco, and drugs (including marijuana). If you need help quitting, talk to your doctor.     Drink plenty of liquids.  Be sure to drink enough water. And limit sodas, other sweetened drinks, and caffeine.     Choose foods that are good sources of calcium, iron, and folate.  You can try dairy products, dark leafy greens, fortified orange juice and cereals, almonds, broccoli, dried fruit, and beans.     Avoid foods that may be harmful.  Don't eat raw meat, deli meat, raw seafood, or raw eggs. Avoid soft cheese and unpasteurized dairy, like Brie and blue cheese. And don't eat fish that contains a lot of mercury, like shark and swordfish.     Don't touch patricia litter or cat poop.  They can cause an infection that could be harmful during pregnancy.     Avoid things that can make your body too hot.  For example, avoid hot tubs and saunas.     Soothe morning sickness.  Try eating 5 or 6 small meals a day, getting some fresh air, or using linda to control symptoms.     Ask your doctor about flu and COVID-19 shots.  Getting them can help protect against infection.   Follow-up care is a key part of your treatment and safety. Be sure to make and go to all appointments, and call your doctor if you are having problems. It's also a good idea to know your test results and keep a list of the medicines you take.  Where can you learn more?  Go to https://www.[email or web address].net/patiented  Enter G112 in the search box to learn more about \"Weeks 6 to 10 of Your Pregnancy: Care Instructions.\"  Current as of: April 30, 2024  Content Version: 14.5    5208-5877 [facility] [employer].   Care instructions adapted " under license by your healthcare professional. If you have questions about a medical condition or this instruction, always ask your healthcare professional. [employer] disclaims any warranty or liability for your use of this information.       Pregnancy: Managing Morning Sickness (01:48)  Your health professional recommends that you watch this short online health video.  Learn how to manage morning sickness during pregnancy.   Purpose: Learn how to manage morning sickness during pregnancy.  Goal: Learn how to manage morning sickness during pregnancy.    Watch: Scan the QR code or visit the link to view video       https://hwi.[email or web address]  Current as of: April 30, 2024  Content Version: 14.5    6835-5895 [employer].   Care instructions adapted under license by your healthcare professional. If you have questions about a medical condition or this instruction, always ask your healthcare professional. [employer] disclaims any warranty or liability for your use of this information.    Pregnancy and Heartburn: Care Instructions  Overview     Heartburn is a common problem during pregnancy.  Heartburn happens when stomach acid backs up into the tube that carries food to the stomach. This tube is called the esophagus. Early in pregnancy, heartburn is caused by hormone changes that slow down digestion. Later on, it's also caused by the large uterus pushing up on the stomach.  Even though you can't fix the cause, there are things you can do to get relief. Treating heartburn during pregnancy focuses first on making lifestyle changes, like changing what and how you eat, and on taking medicines.  Heartburn usually improves or goes away after childbirth.  Follow-up care is a key part of your treatment and safety. Be sure to make and go to all appointments, and call your doctor if you are having problems. It's also a good idea to know your test results and keep a list of the medicines you  "take.  How can you care for yourself at home?  Eat small, frequent meals.  Avoid foods that make your symptoms worse, such as chocolate, peppermint, and spicy foods. Avoid drinks with caffeine, such as coffee, tea, and sodas.  Avoid bending over or lying down after meals.  Take a short walk after you eat.  If heartburn is a problem at night, do not eat for 2 hours before bedtime.  Take antacids like Mylanta, Maalox, Rolaids, or Tums. Do not take antacids that have sodium bicarbonate, magnesium trisilicate, or aspirin. Be careful when you take over-the-counter antacid medicines. Many of these medicines have aspirin in them. While you are pregnant, do not take aspirin or medicines that contain aspirin unless your doctor says it is okay.  If you're not getting relief, talk to your doctor. You may be able to take a stronger acid-reducing medicine.  When should you call for help?   Call your doctor now or seek immediate medical care if:    You have new or worse belly pain.     You are vomiting.   Watch closely for changes in your health, and be sure to contact your doctor if:    You have new or worse symptoms of reflux.     You are losing weight.     You have trouble or pain swallowing.     You do not get better as expected.   Where can you learn more?  Go to https://www.[employer].net/patiented  Enter U946 in the search box to learn more about \"Pregnancy and Heartburn: Care Instructions.\"  Current as of: April 30, 2024  Content Version: 14.5 2024-2025 [employer].   Care instructions adapted under license by your healthcare professional. If you have questions about a medical condition or this instruction, always ask your healthcare professional. [employer] disclaims any warranty or liability for your use of this information.    Constipation: Care Instructions  Overview     Constipation means that you have a hard time passing stools (bowel movements). People pass stools from 3 times a day to " once every 3 days. What is normal for you may be different. Constipation may occur with pain in the rectum and cramping. The pain may get worse when you try to pass stools. Sometimes there are small amounts of bright red blood on toilet paper or the surface of stools. This is because of enlarged veins near the rectum (hemorrhoids).  A few changes in your diet and lifestyle may help you avoid ongoing constipation. Your doctor may also prescribe medicine to help loosen your stool.  Some medicines can cause constipation. These include pain medicines and antidepressants. Tell your doctor about all the medicines you take. Your doctor may want to make a medicine change to ease your symptoms.  Follow-up care is a key part of your treatment and safety. Be sure to make and go to all appointments, and call your doctor if you are having problems. It's also a good idea to know your test results and keep a list of the medicines you take.  How can you care for yourself at home?  Drink plenty of fluids. If you have kidney, heart, or liver disease and have to limit fluids, talk with your doctor before you increase the amount of fluids you drink.  Include high-fiber foods in your diet each day. These include fruits, vegetables, beans, and whole grains.  Get at least 30 minutes of exercise on most days of the week. Walking is a good choice. You also may want to do other activities, such as running, swimming, cycling, or playing tennis or team sports.  Take a fiber supplement, such as Citrucel or Metamucil, every day. Read and follow all instructions on the label.  Schedule time each day for a bowel movement. A daily routine may help. Take your time having a bowel movement, but don't sit for more than 10 minutes at a time. And don't strain too much.  Support your feet with a small step stool when you sit on the toilet. This helps flex your hips and places your pelvis in a squatting position.  Your doctor may recommend an  "over-the-counter laxative to relieve your constipation. Examples are Milk of Magnesia and MiraLax. Read and follow all instructions on the label. Do not use laxatives on a long-term basis.  When should you call for help?   Call your doctor now or seek immediate medical care if:    You have new or worse belly pain.     You have new or worse nausea or vomiting.     You have blood in your stools.   Watch closely for changes in your health, and be sure to contact your doctor if:    Your constipation is getting worse.     You do not get better as expected.   Where can you learn more?  Go to https://www.[employer]X.net/patiented  Enter P343 in the search box to learn more about \"Constipation: Care Instructions.\"  Current as of: October 19, 2024  Content Version: 14.5 2024-2025 [employer].   Care instructions adapted under license by your healthcare professional. If you have questions about a medical condition or this instruction, always ask your healthcare professional. [employer] disclaims any warranty or liability for your use of this information.    Learning About High-Iron Foods  What foods are high in iron?     The foods you eat contain nutrients, such as vitamins and minerals. Iron is a nutrient. Your body needs the right amount to stay healthy and work as it should. You can use the list below to help you make choices about which foods to eat.  Here are some foods that contain iron. They have 1 to 2 milligrams of iron per serving.  Fruits  Figs (dried), 5 figs  Vegetables  Asparagus (canned), 6 carter  Stacia, beet, Swiss chard, or turnip greens, 1 cup  Dried peas, cooked,   cup  Seaweed, spirulina (dried),   cup  Spinach, (cooked)   cup or (raw) 1 cup  Grains  Cereals, fortified with iron, 1 cup  Grits (instant, cooked), fortified with iron,   cup  Meats and other protein foods  Beans (kidney, lima, navy, white), canned or cooked,   cup  Beef or lamb, 3 oz  Chicken giblets, 3 " "oz  Chickpeas (garbanzo beans),   cup  Liver of beef, lamb, or pork, 3 oz  Oysters (cooked), 3 oz  Sardines (canned), 3 oz  Soybeans (boiled),   cup  Tofu (firm),   cup  Work with your doctor to find out how much of this nutrient you need. Depending on your health, you may need more or less of it in your diet.  Where can you learn more?  Go to https://[email or web address].net/patiented  Enter R005 in the search box to learn more about \"Learning About High-Iron Foods.\"  Current as of: October 7, 2024  Content Version: 14.5 2024-2025 [employer].   Care instructions adapted under license by your healthcare professional. If you have questions about a medical condition or this instruction, always ask your healthcare professional. [employer] disclaims any warranty or liability for your use of this information.    Learning About Fetal Ultrasound Results  What is a fetal ultrasound?     Fetal ultrasound is a test that lets your doctor see an image of your baby. Your doctor learns information about your baby from this picture. You may find out, for example, if you are having a boy or a girl. But the main reason you have this test is to get information about your baby's health.  (You may hear your baby called a fetus. This is a common medical term for a baby that's growing in the mother's uterus.)  What kind of information can you learn from this test?  The findings of an ultrasound fall into two categories, normal and abnormal.  Normal  The fetus is the right size for its age.  The placenta is the expected size and does not cover the cervix.  There is enough amniotic fluid in the uterus.  No birth defects can be seen.  Abnormal  The fetus is small or large for its age.  The placenta covers the cervix.  There is too much or too little amniotic fluid in the uterus.  The fetus may have a birth defect.  What does an abnormal result mean?  Abnormal seems to imply that something is wrong with your baby. But " what it means is that the test has shown something the doctor wants to take a closer look at.  And that's what happens next. Your doctor will talk to you about what further test or tests you may need.  What do the results mean?  Some of the things your doctor may see on an abnormal ultrasound include:  Echogenic bowel.  The bowel looks very bright on the screen. This could mean that there's blood in the bowel. Or it could mean that something is blocking the small bowel.  Increased nuchal translucency.  The ultrasound measures the thickness at the back of the baby's neck. An increase in thickness is sometimes an early sign of Down syndrome.  Increased or decreased amniotic fluid.  The doctor will look for a reason for the level of amniotic fluid and will watch the pregnancy closely as it progresses.  Large ventricles.  Ventricles in the brain look larger than they should. Your doctor may take a closer look at the brain.  Renal pyelectasis/hydronephrosis.  The ultrasound measures the fluid around the kidney. If there is more fluid than expected, there is a chance of urinary tract or kidney problems.  Short long bones.  The ultrasound measures certain arm and leg bones. A long bone (humerus or femur) that is shorter than average could be a sign of Down syndrome.  Subchorionic hemorrhage.  An ultrasound can show bleeding under one of the membranes that surrounds the fetus. Some women don't have symptoms of bleeding. The ultrasound can find this problem when women are not bleeding from their vagina. Women who have this condition have a slightly higher chance of miscarriage.  What do you do now?  Take a deep breath, and let it out. Keep in mind that an abnormal finding on an ultrasound, after it's coupled with more information, may:  Turn out to be nothing.  Turn out to be something mild that won't affect the baby.  Turn out to be something more serious. But if this happens, early diagnosis helps you and your doctor plan  "treatment options sooner rather than later.  Your medical team is there for you. So are your family and friends. Ask questions, and get the help and support you need.  Follow-up care is a key part of your treatment and safety. Be sure to make and go to all appointments, and call your doctor if you are having problems. It's also a good idea to know your test results and keep a list of the medicines you take.  Where can you learn more?  Go to https://www.[employer].net/patiented  Enter K451 in the search box to learn more about \"Learning About Fetal Ultrasound Results.\"  Current as of: April 30, 2024  Content Version: 14.5    9525-0201 [employer].   Care instructions adapted under license by your healthcare professional. If you have questions about a medical condition or this instruction, always ask your healthcare professional. [employer] disclaims any warranty or liability for your use of this information.    Learning About Prenatal Visits  Overview     Regular prenatal visits are very important during any pregnancy. These quick office visits may seem simple and routine. But they can help you have a safe and healthy pregnancy. Your doctor is watching for problems that can only be found through regular checkups. The visits also give you and your doctor time to build a good relationship.  After your first visit, you will most likely start on a schedule of monthly visits. In your third trimester, the visits will get more frequent. Based on your health, your age, and if you've had a normal, full-term pregnancy before, your doctor may want to see you more or less often.  At different times in your pregnancy, you will have exams and tests. Some are routine. Others are done only when there is a chance of a problem. Everything healthy you do for your body helps you have a healthy pregnancy. Rest when you need it. Eat well, drink plenty of water, and exercise regularly.  What happens during a prenatal " visit?  You will have blood pressure checks, along with urine tests. You also may have blood tests. If you need to go to the bathroom while waiting for the doctor, tell the nurse. You will be given a sample cup so your urine can be tested.  You will be weighed and have your belly measured.  Your doctor may listen to the fetal heartbeat with a special device.  At about 24 weeks, and possibly earlier in your pregnancy, your doctor will check your blood sugar (glucose tolerance test) for diabetes that can occur during pregnancy. This is gestational diabetes, which can be harmful.  You will have tests to check for infections that could harm your . These include group B streptococcus and hepatitis B.  Your doctor may do ultrasounds to check for problems. This also checks the position of the fetus. An ultrasound uses sound waves to produce a picture of the fetus.  You may get your vaccines updated.  Your doctor may ask you questions to check for signs of anxiety or depression. Tell your doctor if you feel sad, anxious, or hopeless for more than a few days.  You may have other tests at any time during your pregnancy.  Use your visits to discuss with your doctor any concerns you have.  How can you care for yourself at home?  Get plenty of rest.  Try to exercise every day, if your doctor says it is okay. If you have not exercised in the past, start out slowly. For example, you can take short walks each day.  Choose healthy foods, such as fruits, vegetables, whole grains, lean proteins, low-fat dairy, and healthy fats.  Drink plenty of fluids. Cut down on drinks with caffeine, such as coffee, tea, and cola. If you have kidney, heart, or liver disease and have to limit fluids, talk with your doctor before you increase the amount of fluids you drink.  Try to avoid chemical fumes, paint fumes, and poisons.  If you smoke, vape, or use alcohol, marijuana, or other drugs, quit or cut back as much as you can. Talk to your  "doctor if you need help quitting.  Review all of your medicines, including over-the-counter medicines and supplements, with your doctor. Some of your routine medicines may need to be changed. Do not stop or start taking any medicines without talking to your doctor first.  Follow-up care is a key part of your treatment and safety. Be sure to make and go to all appointments, and call your doctor if you are having problems. It's also a good idea to know your test results and keep a list of the medicines you take.  Where can you learn more?  Go to https://www.[email or web address].net/patiented  Enter J502 in the search box to learn more about \"Learning About Prenatal Visits.\"  Current as of: April 30, 2024  Content Version: 14.5 2024-2025 [employer].   Care instructions adapted under license by your healthcare professional. If you have questions about a medical condition or this instruction, always ask your healthcare professional. [employer] disclaims any warranty or liability for your use of this information.    Intimate Partner Violence: Care Instructions  Overview     If you want to save this information but don't think it is safe to take it home, see if a trusted friend can keep it for you. Plan ahead. Know who you can call for help, and memorize the phone number.   Be careful online too. Your online activity may be seen by others. Do not use your personal computer or device to read about this topic. Use a safe computer, such as one at work, a friend's home, or a library.    Intimate partner violence--a type of domestic abuse--is different from an argument now and then. It is a pattern of abuse that one person may use to control another person's behavior. It may start with threats and name-calling. Then, it may lead to more serious acts, like pushing and slapping. The abuse also may occur in other areas. For example, the abuser may withhold money or spend a partner's money without their " knowledge.  Abuse can cause serious harm. You are more likely to have a long-term health problem from the injuries and stress of living in a violent relationship. People who are sexually abused by their partners have more sexually transmitted infections and unplanned pregnancies. Anyone who is abused also faces emotional pain. Anyone can be abused in relationships. In some relationships, both people use abusive behavior.  If you are pregnant, abuse can cause problems such as poor weight gain, infections, and bleeding. Abuse during this time may increase your baby's risk of low birth weight, premature birth, and death.  Follow-up care is a key part of your treatment and safety. Be sure to make and go to all appointments, and call your doctor if you are having problems. It's also a good idea to know your test results and keep a list of the medicines you take.  How can you care for yourself at home?  If you do not have a safe place to stay, discuss this with your doctor before you leave.  Have a plan for where to go, how to leave your home, and where to stay in case of an emergency. Do not tell your partner about your plan. Contact:  The National Domestic Violence Hotline toll-free at 1-930.210.1487. They can help you find resources in your area.  Your local police department, hospital, or clinic for information about shelters and safe homes near you.  Talk to a trusted friend or neighbor, a counselor, or a carito leader. Do not feel that you have to hide what happened.  Teach your children how to call for help in an emergency.  Be alert to warning signs, such as threats, heavy alcohol use, or drug use. This can help you avoid danger.  If you can, make sure that there are no guns or other weapons in your home.  When should you call for help?   Call 911 anytime you think you may need emergency care. For example, call if:    You or someone else has just been abused.     You think you or someone else is in danger of being  "abused.   Watch closely for changes in your health, and be sure to contact your doctor if you have any problems.  Where can you learn more?  Go to https://www.[employer].net/patiented  Enter G282 in the search box to learn more about \"Intimate Partner Violence: Care Instructions.\"  Current as of: July 31, 2024  Content Version: 14.5    8451-9117 [employer].   Care instructions adapted under license by your healthcare professional. If you have questions about a medical condition or this instruction, always ask your healthcare professional. [employer] disclaims any warranty or liability for your use of this information.    Vaginal Bleeding During Pregnancy: Care Instructions  Overview     It's common to have some vaginal spotting when you are pregnant. In some cases, the bleeding isn't serious. And there aren't any more problems with the pregnancy.  But sometimes bleeding is a sign of a more serious problem. This is more common if the bleeding is heavy or painful. Examples of more serious problems include miscarriage, an ectopic pregnancy, and a problem with the placenta.  You may have to see your doctor again to be sure everything is okay. You may also need more tests to find the cause of the bleeding.  Home treatment may be all you need. But it depends on what is causing the bleeding. Be sure to tell your doctor if you have any new symptoms or if your symptoms get worse.  The doctor has checked you carefully, but problems can develop later. If you notice any problems or new symptoms, get medical treatment right away.  Follow-up care is a key part of your treatment and safety. Be sure to make and go to all appointments, and call your doctor if you are having problems. It's also a good idea to know your test results and keep a list of the medicines you take.  How can you care for yourself at home?  If your doctor prescribed medicines, take them exactly as directed. Call your doctor if you think " you are having a problem with your medicine.  Do not have vaginal sex until your doctor says it's okay.  Do not put anything in your vagina until your doctor says it's okay.  Ask your doctor about other activities you can or can't do.  Get a lot of rest. Being pregnant can make you tired.  Do not use nonsteroidal anti-inflammatory drugs (NSAIDs), such as ibuprofen (Advil, Motrin), naproxen (Aleve), or aspirin, unless your doctor says it is okay.  When should you call for help?   Call 911 anytime you think you may need emergency care. For example, call if:    You passed out (lost consciousness).     You have severe vaginal bleeding. This means you are soaking through a pad each hour for 2 or more hours.     You have sudden, severe pain in your belly or pelvis.   Call your doctor now or seek immediate medical care if:    You have new or worse vaginal bleeding.     You are dizzy or lightheaded, or you feel like you may faint.     You have pain in your belly, pelvis, or lower back.     You think that you are in labor.     You have a sudden release of fluid from your vagina.     You've been having regular contractions for an hour. This means that you've had at least 8 contractions within 1 hour or at least 4 contractions within 20 minutes, even after you change your position and drink fluids.     You notice that your baby has stopped moving or is moving much less than normal.   Watch closely for changes in your health, and be sure to contact your doctor if you have any problems.  Current as of: April 30, 2024  Content Version: 14.5    7961-5952 [employer].   Care instructions adapted under license by your healthcare professional. If you have questions about a medical condition or this instruction, always ask your healthcare professional. [employer] disclaims any warranty or liability for your use of this information.  Learning About Pregnancy  Your Care Instructions     Your health in the early  weeks of your pregnancy is particularly important for your baby's health. Take good care of yourself. Anything you do that harms your body can also harm your baby.  Make sure to go to all of your doctor appointments. Regular checkups will help keep you and your baby healthy.  How can you care for yourself at home?  Diet    Choose healthy foods like fruits, vegetables, whole grains, lean proteins, and healthy fats.     Choose foods that are good sources of calcium, iron, and folate. You can try dairy products, dark leafy greens, fortified orange juice and cereals, almonds, broccoli, dried fruit, and beans.     Do not skip meals or go for many hours without eating. If you are nauseated, try to eat a small, healthy snack every 2 to 3 hours.     Avoid fish that are high in mercury. These include shark, swordfish, alejandro mackerel, marlin, orange roughy, and bigeye tuna, as well as tilefish from the [facility].     It's okay to eat up to 8 to 12 ounces a week of fish that are low in mercury or up to 4 ounces a week of fish that have medium levels of mercury. Some fish that are low in mercury are salmon, shrimp, canned light tuna, cod, and tilapia. Some fish that have medium levels of mercury are halibut and white albacore tuna.     Drink plenty of fluids. If you have kidney, heart, or liver disease and have to limit fluids, talk with your doctor before you increase the amount of fluids you drink.     Limit caffeine to about 200 to 300 mg per day. On average, a cup of brewed coffee has around 80 to 100 mg of caffeine.     Do not drink alcohol, such as beer, wine, or hard liquor.     Take a multivitamin that contains at least 400 micrograms (mcg) of folic acid to help prevent birth defects. Fortified cereal and whole wheat bread are good additional sources of folic acid.     Increase the calcium in your diet. Try to drink a quart of skim milk each day. You may also take calcium supplements and choose foods such as cheese  and yogurt.   Lifestyle    Make sure you go to your follow-up appointments.     Get plenty of rest. You may be unusually tired while you are pregnant.     Get at least 30 minutes of exercise on most days of the week. Walking is a good choice. If you have not exercised in the past, start out slowly. Take several short walks each day.     Do not smoke. If you need help quitting, talk to your doctor about stop-smoking programs. These can increase your chances of quitting for good.     Do not touch cat feces or litter boxes. Also, wash your hands after you handle raw meat, and fully cook all meat before you eat it. Wear gloves when you work in the yard or garden, and wash your hands well when you are done. Cat feces, raw or undercooked meat, and contaminated dirt can cause an infection that may harm your baby or lead to a miscarriage.     Avoid things that can make your body too hot and may be harmful to your baby, such as a hot tub or sauna. Or talk with your doctor before doing anything that raises your body temperature. Your doctor can tell you if it's safe.     Avoid chemical fumes, paint fumes, or poisons.     Do not use illegal drugs, marijuana, or alcohol.   Medicines    Review all of your medicines with your doctor. Some of your routine medicines may need to be changed to protect your baby.     Use acetaminophen (Tylenol) to relieve minor problems, such as a mild headache or backache or a mild fever with cold symptoms. Do not use nonsteroidal anti-inflammatory drugs (NSAIDs), such as ibuprofen (Advil, Motrin) or naproxen (Aleve), unless your doctor says it is okay.     Do not take two or more pain medicines at the same time unless the doctor told you to. Many pain medicines have acetaminophen, which is Tylenol. Too much acetaminophen (Tylenol) can be harmful.     Take your medicines exactly as prescribed. Call your doctor if you think you are having a problem with your medicine.   To manage morning sickness     "Keep food in your stomach, but not too much at once. Try eating five or six small meals a day instead of three large meals.     For nausea when you wake up, eat a small snack, such as a couple of crackers or pretzels, before rising. Allow a few minutes for your stomach to settle before you slowly get up.     Try to avoid smells and foods that make you feel nauseated. High-fat or greasy foods, milk, and coffee may make nausea worse. Some foods that may be easier to tolerate include cold, spicy, sour, and salty foods.     Drink enough fluids. Water and other caffeine-free drinks are good choices.     Take your prenatal vitamins at night on a full stomach.     Try foods and drinks made with mulu. Mulu may help with nausea.     Get lots of rest. Morning sickness may be worse when you are tired.     Talk to your doctor about over-the-counter products, such as vitamin B6 or doxylamine, to help relieve symptoms.     Try a P6 acupressure wrist band. These anti-nausea wristbands help some people.   Follow-up care is a key part of your treatment and safety. Be sure to make and go to all appointments, and call your doctor if you are having problems. It's also a good idea to know your test results and keep a list of the medicines you take.  Where can you learn more?  Go to https://www.[employer].net/patiented  Enter E868 in the search box to learn more about \"Learning About Pregnancy.\"  Current as of: April 30, 2024  Content Version: 14.5 2024-2025 [employer].   Care instructions adapted under license by your healthcare professional. If you have questions about a medical condition or this instruction, always ask your healthcare professional. [employer] disclaims any warranty or liability for your use of this information.    Weeks 6 to 10 of Your Pregnancy: Care Instructions  During these weeks of pregnancy, your body goes through many changes. You may start to feel different, both in your body and " "your emotions. Each pregnancy is different, so there's no \"right\" way to feel. These early weeks are a time to make healthy choices for you and your pregnancy.    Take a daily prenatal vitamin. Choose one with folic acid in it.   Avoid alcohol, tobacco, and drugs (including marijuana). If you need help quitting, talk to your doctor.     Drink plenty of liquids.  Be sure to drink enough water. And limit sodas, other sweetened drinks, and caffeine.     Choose foods that are good sources of calcium, iron, and folate.  You can try dairy products, dark leafy greens, fortified orange juice and cereals, almonds, broccoli, dried fruit, and beans.     Avoid foods that may be harmful.  Don't eat raw meat, deli meat, raw seafood, or raw eggs. Avoid soft cheese and unpasteurized dairy, like Brie and blue cheese. And don't eat fish that contains a lot of mercury, like shark and swordfish.     Don't touch patricia litter or cat poop.  They can cause an infection that could be harmful during pregnancy.     Avoid things that can make your body too hot.  For example, avoid hot tubs and saunas.     Soothe morning sickness.  Try eating 5 or 6 small meals a day, getting some fresh air, or using linda to control symptoms.     Ask your doctor about flu and COVID-19 shots.  Getting them can help protect against infection.   Follow-up care is a key part of your treatment and safety. Be sure to make and go to all appointments, and call your doctor if you are having problems. It's also a good idea to know your test results and keep a list of the medicines you take.  Where can you learn more?  Go to https://www.[employer].net/patiented  Enter G112 in the search box to learn more about \"Weeks 6 to 10 of Your Pregnancy: Care Instructions.\"  Current as of: April 30, 2024  Content Version: 14.5    9502-6319 [employer].   Care instructions adapted under license by your healthcare professional. If you have questions about a medical " condition or this instruction, always ask your healthcare professional. [employer] disclaims any warranty or liability for your use of this information.       Pregnancy: Managing Morning Sickness (01:48)  Your health professional recommends that you watch this short online health video.  Learn how to manage morning sickness during pregnancy.   Purpose: Learn how to manage morning sickness during pregnancy.  Goal: Learn how to manage morning sickness during pregnancy.    Watch: Scan the QR code or visit the link to view video       [email or web address]  Current as of: April 30, 2024  Content Version: 14.5    5808-6592 [employer].   Care instructions adapted under license by your healthcare professional. If you have questions about a medical condition or this instruction, always ask your healthcare professional. [employer] disclaims any warranty or liability for your use of this information.    Pregnancy and Heartburn: Care Instructions  Overview     Heartburn is a common problem during pregnancy.  Heartburn happens when stomach acid backs up into the tube that carries food to the stomach. This tube is called the esophagus. Early in pregnancy, heartburn is caused by hormone changes that slow down digestion. Later on, it's also caused by the large uterus pushing up on the stomach.  Even though you can't fix the cause, there are things you can do to get relief. Treating heartburn during pregnancy focuses first on making lifestyle changes, like changing what and how you eat, and on taking medicines.  Heartburn usually improves or goes away after childbirth.  Follow-up care is a key part of your treatment and safety. Be sure to make and go to all appointments, and call your doctor if you are having problems. It's also a good idea to know your test results and keep a list of the medicines you take.  How can you care for yourself at home?  Eat small, frequent meals.  Avoid foods  "that make your symptoms worse, such as chocolate, peppermint, and spicy foods. Avoid drinks with caffeine, such as coffee, tea, and sodas.  Avoid bending over or lying down after meals.  Take a short walk after you eat.  If heartburn is a problem at night, do not eat for 2 hours before bedtime.  Take antacids like Mylanta, Maalox, Rolaids, or Tums. Do not take antacids that have sodium bicarbonate, magnesium trisilicate, or aspirin. Be careful when you take over-the-counter antacid medicines. Many of these medicines have aspirin in them. While you are pregnant, do not take aspirin or medicines that contain aspirin unless your doctor says it is okay.  If you're not getting relief, talk to your doctor. You may be able to take a stronger acid-reducing medicine.  When should you call for help?   Call your doctor now or seek immediate medical care if:    You have new or worse belly pain.     You are vomiting.   Watch closely for changes in your health, and be sure to contact your doctor if:    You have new or worse symptoms of reflux.     You are losing weight.     You have trouble or pain swallowing.     You do not get better as expected.   Where can you learn more?  Go to https://www.[employer].net/patiented  Enter U946 in the search box to learn more about \"Pregnancy and Heartburn: Care Instructions.\"  Current as of: April 30, 2024  Content Version: 14.5 2024-2025 [employer].   Care instructions adapted under license by your healthcare professional. If you have questions about a medical condition or this instruction, always ask your healthcare professional. [employer] disclaims any warranty or liability for your use of this information.    Constipation: Care Instructions  Overview     Constipation means that you have a hard time passing stools (bowel movements). People pass stools from 3 times a day to once every 3 days. What is normal for you may be different. Constipation may occur with " pain in the rectum and cramping. The pain may get worse when you try to pass stools. Sometimes there are small amounts of bright red blood on toilet paper or the surface of stools. This is because of enlarged veins near the rectum (hemorrhoids).  A few changes in your diet and lifestyle may help you avoid ongoing constipation. Your doctor may also prescribe medicine to help loosen your stool.  Some medicines can cause constipation. These include pain medicines and antidepressants. Tell your doctor about all the medicines you take. Your doctor may want to make a medicine change to ease your symptoms.  Follow-up care is a key part of your treatment and safety. Be sure to make and go to all appointments, and call your doctor if you are having problems. It's also a good idea to know your test results and keep a list of the medicines you take.  How can you care for yourself at home?  Drink plenty of fluids. If you have kidney, heart, or liver disease and have to limit fluids, talk with your doctor before you increase the amount of fluids you drink.  Include high-fiber foods in your diet each day. These include fruits, vegetables, beans, and whole grains.  Get at least 30 minutes of exercise on most days of the week. Walking is a good choice. You also may want to do other activities, such as running, swimming, cycling, or playing tennis or team sports.  Take a fiber supplement, such as Citrucel or Metamucil, every day. Read and follow all instructions on the label.  Schedule time each day for a bowel movement. A daily routine may help. Take your time having a bowel movement, but don't sit for more than 10 minutes at a time. And don't strain too much.  Support your feet with a small step stool when you sit on the toilet. This helps flex your hips and places your pelvis in a squatting position.  Your doctor may recommend an over-the-counter laxative to relieve your constipation. Examples are Milk of Magnesia and MiraLax.  "Read and follow all instructions on the label. Do not use laxatives on a long-term basis.  When should you call for help?   Call your doctor now or seek immediate medical care if:    You have new or worse belly pain.     You have new or worse nausea or vomiting.     You have blood in your stools.   Watch closely for changes in your health, and be sure to contact your doctor if:    Your constipation is getting worse.     You do not get better as expected.   Where can you learn more?  Go to https://www.[employer].net/patiented  Enter P343 in the search box to learn more about \"Constipation: Care Instructions.\"  Current as of: October 19, 2024  Content Version: 14.5 2024-2025 [employer].   Care instructions adapted under license by your healthcare professional. If you have questions about a medical condition or this instruction, always ask your healthcare professional. [employer] disclaims any warranty or liability for your use of this information.    Learning About High-Iron Foods  What foods are high in iron?     The foods you eat contain nutrients, such as vitamins and minerals. Iron is a nutrient. Your body needs the right amount to stay healthy and work as it should. You can use the list below to help you make choices about which foods to eat.  Here are some foods that contain iron. They have 1 to 2 milligrams of iron per serving.  Fruits  Figs (dried), 5 figs  Vegetables  Asparagus (canned), 6 carter  Stacia, beet, Swiss chard, or turnip greens, 1 cup  Dried peas, cooked,   cup  Seaweed, spirulina (dried),   cup  Spinach, (cooked)   cup or (raw) 1 cup  Grains  Cereals, fortified with iron, 1 cup  Grits (instant, cooked), fortified with iron,   cup  Meats and other protein foods  Beans (kidney, lima, navy, white), canned or cooked,   cup  Beef or lamb, 3 oz  Chicken giblets, 3 oz  Chickpeas (garbanzo beans),   cup  Liver of beef, lamb, or pork, 3 oz  Oysters (cooked), 3 oz  Sardines " "(canned), 3 oz  Soybeans (boiled),   cup  Tofu (firm),   cup  Work with your doctor to find out how much of this nutrient you need. Depending on your health, you may need more or less of it in your diet.  Where can you learn more?  Go to https://www.[employer].net/patiented  Enter R005 in the search box to learn more about \"Learning About High-Iron Foods.\"  Current as of: October 7, 2024  Content Version: 14.5 2024-2025 [employer].   Care instructions adapted under license by your healthcare professional. If you have questions about a medical condition or this instruction, always ask your healthcare professional. [employer] disclaims any warranty or liability for your use of this information.    Learning About Fetal Ultrasound Results  What is a fetal ultrasound?     Fetal ultrasound is a test that lets your doctor see an image of your baby. Your doctor learns information about your baby from this picture. You may find out, for example, if you are having a boy or a girl. But the main reason you have this test is to get information about your baby's health.  (You may hear your baby called a fetus. This is a common medical term for a baby that's growing in the mother's uterus.)  What kind of information can you learn from this test?  The findings of an ultrasound fall into two categories, normal and abnormal.  Normal  The fetus is the right size for its age.  The placenta is the expected size and does not cover the cervix.  There is enough amniotic fluid in the uterus.  No birth defects can be seen.  Abnormal  The fetus is small or large for its age.  The placenta covers the cervix.  There is too much or too little amniotic fluid in the uterus.  The fetus may have a birth defect.  What does an abnormal result mean?  Abnormal seems to imply that something is wrong with your baby. But what it means is that the test has shown something the doctor wants to take a closer look at.  And that's what " happens next. Your doctor will talk to you about what further test or tests you may need.  What do the results mean?  Some of the things your doctor may see on an abnormal ultrasound include:  Echogenic bowel.  The bowel looks very bright on the screen. This could mean that there's blood in the bowel. Or it could mean that something is blocking the small bowel.  Increased nuchal translucency.  The ultrasound measures the thickness at the back of the baby's neck. An increase in thickness is sometimes an early sign of Down syndrome.  Increased or decreased amniotic fluid.  The doctor will look for a reason for the level of amniotic fluid and will watch the pregnancy closely as it progresses.  Large ventricles.  Ventricles in the brain look larger than they should. Your doctor may take a closer look at the brain.  Renal pyelectasis/hydronephrosis.  The ultrasound measures the fluid around the kidney. If there is more fluid than expected, there is a chance of urinary tract or kidney problems.  Short long bones.  The ultrasound measures certain arm and leg bones. A long bone (humerus or femur) that is shorter than average could be a sign of Down syndrome.  Subchorionic hemorrhage.  An ultrasound can show bleeding under one of the membranes that surrounds the fetus. Some women don't have symptoms of bleeding. The ultrasound can find this problem when women are not bleeding from their vagina. Women who have this condition have a slightly higher chance of miscarriage.  What do you do now?  Take a deep breath, and let it out. Keep in mind that an abnormal finding on an ultrasound, after it's coupled with more information, may:  Turn out to be nothing.  Turn out to be something mild that won't affect the baby.  Turn out to be something more serious. But if this happens, early diagnosis helps you and your doctor plan treatment options sooner rather than later.  Your medical team is there for you. So are your family and  "friends. Ask questions, and get the help and support you need.  Follow-up care is a key part of your treatment and safety. Be sure to make and go to all appointments, and call your doctor if you are having problems. It's also a good idea to know your test results and keep a list of the medicines you take.  Where can you learn more?  Go to https://www.dakick.net/patiented  Enter K451 in the search box to learn more about \"Learning About Fetal Ultrasound Results.\"  Current as of: April 30, 2024  Content Version: 14.5    8610-1777 [employer].   Care instructions adapted under license by your healthcare professional. If you have questions about a medical condition or this instruction, always ask your healthcare professional. [employer] disclaims any warranty or liability for your use of this information.    Learning About Prenatal Visits  Overview     Regular prenatal visits are very important during any pregnancy. These quick office visits may seem simple and routine. But they can help you have a safe and healthy pregnancy. Your doctor is watching for problems that can only be found through regular checkups. The visits also give you and your doctor time to build a good relationship.  After your first visit, you will most likely start on a schedule of monthly visits. In your third trimester, the visits will get more frequent. Based on your health, your age, and if you've had a normal, full-term pregnancy before, your doctor may want to see you more or less often.  At different times in your pregnancy, you will have exams and tests. Some are routine. Others are done only when there is a chance of a problem. Everything healthy you do for your body helps you have a healthy pregnancy. Rest when you need it. Eat well, drink plenty of water, and exercise regularly.  What happens during a prenatal visit?  You will have blood pressure checks, along with urine tests. You also may have blood tests. If " you need to go to the bathroom while waiting for the doctor, tell the nurse. You will be given a sample cup so your urine can be tested.  You will be weighed and have your belly measured.  Your doctor may listen to the fetal heartbeat with a special device.  At about 24 weeks, and possibly earlier in your pregnancy, your doctor will check your blood sugar (glucose tolerance test) for diabetes that can occur during pregnancy. This is gestational diabetes, which can be harmful.  You will have tests to check for infections that could harm your . These include group B streptococcus and hepatitis B.  Your doctor may do ultrasounds to check for problems. This also checks the position of the fetus. An ultrasound uses sound waves to produce a picture of the fetus.  You may get your vaccines updated.  Your doctor may ask you questions to check for signs of anxiety or depression. Tell your doctor if you feel sad, anxious, or hopeless for more than a few days.  You may have other tests at any time during your pregnancy.  Use your visits to discuss with your doctor any concerns you have.  How can you care for yourself at home?  Get plenty of rest.  Try to exercise every day, if your doctor says it is okay. If you have not exercised in the past, start out slowly. For example, you can take short walks each day.  Choose healthy foods, such as fruits, vegetables, whole grains, lean proteins, low-fat dairy, and healthy fats.  Drink plenty of fluids. Cut down on drinks with caffeine, such as coffee, tea, and cola. If you have kidney, heart, or liver disease and have to limit fluids, talk with your doctor before you increase the amount of fluids you drink.  Try to avoid chemical fumes, paint fumes, and poisons.  If you smoke, vape, or use alcohol, marijuana, or other drugs, quit or cut back as much as you can. Talk to your doctor if you need help quitting.  Review all of your medicines, including over-the-counter medicines and  "supplements, with your doctor. Some of your routine medicines may need to be changed. Do not stop or start taking any medicines without talking to your doctor first.  Follow-up care is a key part of your treatment and safety. Be sure to make and go to all appointments, and call your doctor if you are having problems. It's also a good idea to know your test results and keep a list of the medicines you take.  Where can you learn more?  Go to https://www.HDmessaging.net/patiented  Enter J502 in the search box to learn more about \"Learning About Prenatal Visits.\"  Current as of: April 30, 2024  Content Version: 14.5    2333-0696 [employer].   Care instructions adapted under license by your healthcare professional. If you have questions about a medical condition or this instruction, always ask your healthcare professional. [employer] disclaims any warranty or liability for your use of this information.    Intimate Partner Violence: Care Instructions  Overview     If you want to save this information but don't think it is safe to take it home, see if a trusted friend can keep it for you. Plan ahead. Know who you can call for help, and memorize the phone number.   Be careful online too. Your online activity may be seen by others. Do not use your personal computer or device to read about this topic. Use a safe computer, such as one at work, a friend's home, or a library.    Intimate partner violence--a type of domestic abuse--is different from an argument now and then. It is a pattern of abuse that one person may use to control another person's behavior. It may start with threats and name-calling. Then, it may lead to more serious acts, like pushing and slapping. The abuse also may occur in other areas. For example, the abuser may withhold money or spend a partner's money without their knowledge.  Abuse can cause serious harm. You are more likely to have a long-term health problem from the injuries " and stress of living in a violent relationship. People who are sexually abused by their partners have more sexually transmitted infections and unplanned pregnancies. Anyone who is abused also faces emotional pain. Anyone can be abused in relationships. In some relationships, both people use abusive behavior.  If you are pregnant, abuse can cause problems such as poor weight gain, infections, and bleeding. Abuse during this time may increase your baby's risk of low birth weight, premature birth, and death.  Follow-up care is a key part of your treatment and safety. Be sure to make and go to all appointments, and call your doctor if you are having problems. It's also a good idea to know your test results and keep a list of the medicines you take.  How can you care for yourself at home?  If you do not have a safe place to stay, discuss this with your doctor before you leave.  Have a plan for where to go, how to leave your home, and where to stay in case of an emergency. Do not tell your partner about your plan. Contact:  The National Domestic Violence Hotline toll-free at 1-715.662.5467. They can help you find resources in your area.  Your local police department, hospital, or clinic for information about shelters and safe homes near you.  Talk to a trusted friend or neighbor, a counselor, or a carito leader. Do not feel that you have to hide what happened.  Teach your children how to call for help in an emergency.  Be alert to warning signs, such as threats, heavy alcohol use, or drug use. This can help you avoid danger.  If you can, make sure that there are no guns or other weapons in your home.  When should you call for help?   Call 911 anytime you think you may need emergency care. For example, call if:    You or someone else has just been abused.     You think you or someone else is in danger of being abused.   Watch closely for changes in your health, and be sure to contact your doctor if you have any  "problems.  Where can you learn more?  Go to https://www.[employer].net/patiented  Enter G282 in the search box to learn more about \"Intimate Partner Violence: Care Instructions.\"  Current as of: July 31, 2024  Content Version: 14.5    2915-6382 [employer].   Care instructions adapted under license by your healthcare professional. If you have questions about a medical condition or this instruction, always ask your healthcare professional. [employer] disclaims any warranty or liability for your use of this information.    Vaginal Bleeding During Pregnancy: Care Instructions  Overview     It's common to have some vaginal spotting when you are pregnant. In some cases, the bleeding isn't serious. And there aren't any more problems with the pregnancy.  But sometimes bleeding is a sign of a more serious problem. This is more common if the bleeding is heavy or painful. Examples of more serious problems include miscarriage, an ectopic pregnancy, and a problem with the placenta.  You may have to see your doctor again to be sure everything is okay. You may also need more tests to find the cause of the bleeding.  Home treatment may be all you need. But it depends on what is causing the bleeding. Be sure to tell your doctor if you have any new symptoms or if your symptoms get worse.  The doctor has checked you carefully, but problems can develop later. If you notice any problems or new symptoms, get medical treatment right away.  Follow-up care is a key part of your treatment and safety. Be sure to make and go to all appointments, and call your doctor if you are having problems. It's also a good idea to know your test results and keep a list of the medicines you take.  How can you care for yourself at home?  If your doctor prescribed medicines, take them exactly as directed. Call your doctor if you think you are having a problem with your medicine.  Do not have vaginal sex until your doctor says it's " okay.  Do not put anything in your vagina until your doctor says it's okay.  Ask your doctor about other activities you can or can't do.  Get a lot of rest. Being pregnant can make you tired.  Do not use nonsteroidal anti-inflammatory drugs (NSAIDs), such as ibuprofen (Advil, Motrin), naproxen (Aleve), or aspirin, unless your doctor says it is okay.  When should you call for help?   Call 911 anytime you think you may need emergency care. For example, call if:    You passed out (lost consciousness).     You have severe vaginal bleeding. This means you are soaking through a pad each hour for 2 or more hours.     You have sudden, severe pain in your belly or pelvis.   Call your doctor now or seek immediate medical care if:    You have new or worse vaginal bleeding.     You are dizzy or lightheaded, or you feel like you may faint.     You have pain in your belly, pelvis, or lower back.     You think that you are in labor.     You have a sudden release of fluid from your vagina.     You've been having regular contractions for an hour. This means that you've had at least 8 contractions within 1 hour or at least 4 contractions within 20 minutes, even after you change your position and drink fluids.     You notice that your baby has stopped moving or is moving much less than normal.   Watch closely for changes in your health, and be sure to contact your doctor if you have any problems.  Current as of: April 30, 2024  Content Version: 14.5    8990-8895 [employer].   Care instructions adapted under license by your healthcare professional. If you have questions about a medical condition or this instruction, always ask your healthcare professional. [employer] disclaims any warranty or liability for your use of this information.

## 2025-06-18 LAB — BACTERIA UR CULT: ABNORMAL

## 2025-06-19 ENCOUNTER — TELEPHONE (OUTPATIENT)
Dept: NURSING | Facility: CLINIC | Age: 25
End: 2025-06-19

## 2025-06-19 NOTE — TELEPHONE ENCOUNTER
[facility]    Reason for call: Lab Result Notification     Lab Result (including Rx patient on, if applicable).  If culture, copy of lab report at bottom.  Lab Result: Urine Culture  6/17/25 Prescribed CephALEXin (KEFLEX) 500 MG capsule Take 1 capsule (500 mg) by mouth 2 times daily for 7 days. - Oral (SUSCEPTIBLE)  Creatinine Level (mg/dl)   Creatinine   Date Value Ref Range Status   06/17/2025 0.70 0.51 - 0.95 mg/dL Final    Creatinine clearance (ml/min), if applicable    Serum creatinine: 0.7 mg/dL 06/17/25 1242  Estimated creatinine clearance: 131.7 mL/min     RN Recommendations/Instructions per [facility] ED lab result protocol:   [facility] ED lab result protocol utilized: Urine Culture    6/17/25 Presented to ED with symptoms:  abdominal pain, increased urinary frequency    Patient's current Symptoms at time of telephone encounter:   Pt states she no longer feels the urgency of urination, pt states a little bit cramping and back pain.      Patient/care giver notified to contact your PCP clinic or return to the Emergency department if your:  Symptoms return.  Symptoms do not improve after 3 days on antibiotic.  Symptoms do not resolve after completing antibiotic.  Symptoms worsen or other concerning symptoms.       Melissa Muller RN

## 2025-07-07 ENCOUNTER — TELEPHONE (OUTPATIENT)
Dept: OBGYN | Facility: CLINIC | Age: 25
End: 2025-07-07

## 2025-07-07 NOTE — TELEPHONE ENCOUNTER
9w2d    Pt calls with dizziness, fatigue, some headaches, some SOB when walking around for 5 min or more.    Just started PNV.   She was having nausea and was not eating as much the past few weeks.     Advised to increase fluids and small frequent snacks. Sent mychart with more tips.    Has zofran she can fill at the pharmacy. CB if needed      Carly MATHEWS RN BSN  [facility]

## 2025-07-15 ENCOUNTER — PRENATAL OFFICE VISIT (OUTPATIENT)
Dept: OBGYN | Facility: CLINIC | Age: 25
End: 2025-07-15

## 2025-07-15 VITALS — DIASTOLIC BLOOD PRESSURE: 70 MMHG | WEIGHT: 192 LBS | BODY MASS INDEX: 32.96 KG/M2 | SYSTOLIC BLOOD PRESSURE: 102 MMHG

## 2025-07-15 DIAGNOSIS — Z12.4 SCREENING FOR MALIGNANT NEOPLASM OF CERVIX: ICD-10-CM

## 2025-07-15 DIAGNOSIS — G40.909 SEIZURE DISORDER (H): ICD-10-CM

## 2025-07-15 DIAGNOSIS — Z34.81 ENCOUNTER FOR SUPERVISION OF OTHER NORMAL PREGNANCY IN FIRST TRIMESTER: Primary | ICD-10-CM

## 2025-07-15 DIAGNOSIS — Z13.79 GENETIC SCREENING: ICD-10-CM

## 2025-07-15 LAB
ALBUMIN SERPL BCG-MCNC: 4.2 G/DL (ref 3.5–5.2)
ALP SERPL-CCNC: 39 U/L (ref 40–150)
ALT SERPL W P-5'-P-CCNC: 11 U/L (ref 0–50)
ANION GAP SERPL CALCULATED.3IONS-SCNC: 11 MMOL/L (ref 7–15)
AST SERPL W P-5'-P-CCNC: 22 U/L (ref 0–45)
BILIRUB SERPL-MCNC: 0.2 MG/DL
BUN SERPL-MCNC: 9.9 MG/DL (ref 6–20)
CALCIUM SERPL-MCNC: 9.6 MG/DL (ref 8.8–10.4)
CHLORIDE SERPL-SCNC: 104 MMOL/L (ref 98–107)
CLUE CELLS: PRESENT
CREAT SERPL-MCNC: 0.62 MG/DL (ref 0.51–0.95)
EGFRCR SERPLBLD CKD-EPI 2021: >90 ML/MIN/1.73M2
EST. AVERAGE GLUCOSE BLD GHB EST-MCNC: 114 MG/DL
GLUCOSE SERPL-MCNC: 93 MG/DL (ref 70–99)
HBA1C MFR BLD: 5.6 % (ref 0–5.6)
HBV SURFACE AG SERPL QL IA: NONREACTIVE
HCO3 SERPL-SCNC: 21 MMOL/L (ref 22–29)
HCV AB SERPL QL IA: NONREACTIVE
HIV 1+2 AB+HIV1 P24 AG SERPL QL IA: NONREACTIVE
LEVETIRACETAM SERPL-MCNC: 20.7 ÂΜG/ML (ref 10–40)
POTASSIUM SERPL-SCNC: 3.5 MMOL/L (ref 3.4–5.3)
PROT SERPL-MCNC: 7.3 G/DL (ref 6.4–8.3)
RUBV IGG SERPL QL IA: 3.24 INDEX
RUBV IGG SERPL QL IA: POSITIVE
SODIUM SERPL-SCNC: 136 MMOL/L (ref 135–145)
T PALLIDUM AB SER QL: NONREACTIVE
TRICHOMONAS, WET PREP: ABNORMAL
WBC'S/HIGH POWER FIELD, WET PREP: ABNORMAL
YEAST, WET PREP: ABNORMAL

## 2025-07-15 PROCEDURE — 99459 PELVIC EXAMINATION: CPT | Performed by: OBSTETRICS & GYNECOLOGY

## 2025-07-15 PROCEDURE — 87210 SMEAR WET MOUNT SALINE/INK: CPT | Performed by: OBSTETRICS & GYNECOLOGY

## 2025-07-15 PROCEDURE — 87340 HEPATITIS B SURFACE AG IA: CPT | Performed by: OBSTETRICS & GYNECOLOGY

## 2025-07-15 PROCEDURE — 99213 OFFICE O/P EST LOW 20 MIN: CPT | Performed by: OBSTETRICS & GYNECOLOGY

## 2025-07-15 PROCEDURE — 86780 TREPONEMA PALLIDUM: CPT | Performed by: OBSTETRICS & GYNECOLOGY

## 2025-07-15 PROCEDURE — 87389 HIV-1 AG W/HIV-1&-2 AB AG IA: CPT | Performed by: OBSTETRICS & GYNECOLOGY

## 2025-07-15 PROCEDURE — 80053 COMPREHEN METABOLIC PANEL: CPT | Performed by: OBSTETRICS & GYNECOLOGY

## 2025-07-15 PROCEDURE — 83036 HEMOGLOBIN GLYCOSYLATED A1C: CPT | Performed by: OBSTETRICS & GYNECOLOGY

## 2025-07-15 PROCEDURE — 80177 DRUG SCRN QUAN LEVETIRACETAM: CPT | Performed by: OBSTETRICS & GYNECOLOGY

## 2025-07-15 PROCEDURE — 86803 HEPATITIS C AB TEST: CPT | Performed by: OBSTETRICS & GYNECOLOGY

## 2025-07-15 PROCEDURE — 87491 CHLMYD TRACH DNA AMP PROBE: CPT | Performed by: OBSTETRICS & GYNECOLOGY

## 2025-07-15 PROCEDURE — 86762 RUBELLA ANTIBODY: CPT | Performed by: OBSTETRICS & GYNECOLOGY

## 2025-07-15 PROCEDURE — 36415 COLL VENOUS BLD VENIPUNCTURE: CPT | Performed by: OBSTETRICS & GYNECOLOGY

## 2025-07-15 PROCEDURE — G2211 COMPLEX E/M VISIT ADD ON: HCPCS | Performed by: OBSTETRICS & GYNECOLOGY

## 2025-07-15 PROCEDURE — 87086 URINE CULTURE/COLONY COUNT: CPT | Performed by: OBSTETRICS & GYNECOLOGY

## 2025-07-15 PROCEDURE — 87591 N.GONORRHOEAE DNA AMP PROB: CPT | Performed by: OBSTETRICS & GYNECOLOGY

## 2025-07-15 RX ORDER — LEVETIRACETAM 1000 MG/1
1 TABLET ORAL
COMMUNITY
Start: 2025-05-15

## 2025-07-15 RX ORDER — PRENATAL VIT/IRON FUM/FOLIC AC 27MG-0.8MG
1 TABLET ORAL DAILY
Qty: 90 TABLET | Refills: 3 | Status: SHIPPED | OUTPATIENT
Start: 2025-07-15

## 2025-07-15 ASSESSMENT — PATIENT HEALTH QUESTIONNAIRE - PHQ9: SUM OF ALL RESPONSES TO PHQ QUESTIONS 1-9: 9

## 2025-07-15 NOTE — PROGRESS NOTES
New OB Visit  Nelsy Quinones   July 15, 2025   10w3d     Subjective: Nelsy Quinones 24 year old  at 10w3d dated by early ultrasound here today for initial OB visit.Estimated Date of Delivery: 2026. Patient reports Bleeding. Denies cramping and vaginal spotting.  Patient has a seizure disorder and is on Keppra 1000 mg twice daily.    Gyn History:   Menses: LMP: Patient's last menstrual period was 2025 (exact date). frequency: q month  Sexually transmitted disease history: Chlamydia.    Occupation: unemployed  Exercise: active  Diet: as tolerated  H/o Chicken Pox or Varicella Vaccination: Yes    no prior history of hypertension, DM, asthma.    History of GDM: No   Hx PTL : No   History of HTN in pregnancy: No   Shoulder dystocia: No   Vacuum Extraction: No   PPH: No   3rd of 4th degree laceration: No   Other complications: Seizure disorder in pregnancy    Since her last LMP she denies use of alcohol, tobacco and street drugs.    OBhx:  x 2  OB History    Para Term  AB Living   3 2 2 0 0 2   SAB IAB Ectopic Multiple Live Births   0 0 0 0 2      # Outcome Date GA Lbr Kang/2nd Weight Sex Type Anes PTL Lv   3 Current            2 Term 23 41w1d 01:41 / 00:14 3.68 kg (8 lb 1.8 oz) M Vag-Spont IV  YAJAIRA      Name: LEAHMALE-NELSY      Apgar1: 7  Apgar5: 9   1 Term 21 40w3d 07:22 / 00:12 2.48 kg (5 lb 7.5 oz) M Vag-Spont None N YAJAIRA      Name: BRAYDON,BABYBOY      Apgar1: 7  Apgar5: 9       ROS: Ten point review of systems was reviewed and negative except the above.    HISTORY:  Past Medical History:   Diagnosis Date    Anemia     Chlamydia     Diastasis recti     Seizure disorder     Suicidal ideation         Walking pneumonia          Past Surgical History:   Procedure Laterality Date    NO PAST SURGERIES       Family History   Problem Relation Age of Onset    Diabetes Mother     Polycystic ovary syndrome Mother     Impaired Fasting Glucose Sister     Asthma  Brother     Asthma Brother     Diabetes Maternal Grandmother      Social History     Socioeconomic History    Marital status:      Spouse name: Laron Toussaint    Number of children: 2   Occupational History    Occupation: currently unemployed   Tobacco Use    Smoking status: Never     Passive exposure: Never    Smokeless tobacco: Never   Vaping Use    Vaping status: Never Used   Substance and Sexual Activity    Alcohol use: Not Currently    Drug use: Never    Sexual activity: Yes     Partners: Male     Birth control/protection: None     Social Drivers of Health     Financial Resource Strain: Low Risk  (2/1/2025)    Received from [facility]    Financial Resource Strain     Difficulty of Paying Living Expenses: 3   Food Insecurity: High Risk (12/3/2024)    Food Insecurity     Within the past 12 months, did you worry that your food would run out before you got money to buy more?: Yes     Within the past 12 months, did the food you bought just not last and you didn t have money to get more?: Yes   Transportation Needs: High Risk (12/3/2024)    Transportation Needs     Within the past 12 months, has lack of transportation kept you from medical appointments, getting your medicines, non-medical meetings or appointments, work, or from getting things that you need?: Yes   Physical Activity: Insufficiently Active (12/3/2024)    Exercise Vital Sign     Days of Exercise per Week: 1 day     Minutes of Exercise per Session: 10 min   Stress: Stress Concern Present (12/3/2024)    Citizen of [place] of Occupational Health - Occupational Stress Questionnaire     Feeling of Stress : Very much   Social Connections: Moderately Integrated (12/3/2024)    Social Connection and Isolation Panel [NHANES]     Frequency of Communication with Friends and Family: More than three times a week     Frequency of Social Gatherings with Friends and Family: Twice a week     Attends Holiness Services: More than 4  times per year     Active Member of Clubs or Organizations: No     Attends Club or Organization Meetings: Never     Marital Status:    Interpersonal Safety: Low Risk  (11/29/2024)    Interpersonal Safety     Do you feel physically and emotionally safe where you currently live?: Yes     Within the past 12 months, have you been hit, slapped, kicked or otherwise physically hurt by someone?: No     Within the past 12 months, have you been humiliated or emotionally abused in other ways by your partner or ex-partner?: No   Housing Stability: High Risk (12/3/2024)    Housing Stability     Do you have housing? : Yes     Are you worried about losing your housing?: Yes     Current Outpatient Medications   Medication Sig Dispense Refill    levETIRAcetam (KEPPRA) 500 MG tablet Take 1,000 mg by mouth 2 times daily      ondansetron (ZOFRAN ODT) 4 MG ODT tab Take 1 tablet (4 mg) by mouth every 8 hours as needed for nausea or vomiting. 10 tablet 0     No current facility-administered medications for this visit.     No Known Allergies    Past medical, surgical, social and family history were reviewed and updated in [facility].      EXAM:  LMP 04/21/2025 (Exact Date)      Gen:  no acute distress, comfortable  HENT: No scleral injection or icterus  CV: Regular rate and rhythm, no murmur  Resp: CTAB, Normal work of breathing, no cough  GI: Abdomen soft, non-tender. No masses, organomegaly  Skin: No suspicious lesions or rashes  Psychiatric: mentation appears normal and affect bright   Pelvis: External genitalia within normal limits. Urethra is without lesion. Bladder is nontender.    On speculum exam, cervix is without lesion and vagina is normal without lesion or discharge. Pap smear obtained without incident. GC/Chlamydia and wet prep obtained  +FHT present on bedside U/S      Recent Labs   Lab Test 06/17/25  1242   AS Negative     Rhogam not indicated             CBC RESULTS:   Recent Labs   Lab Test 06/17/25  1242   WBC 8.0   RBC  4.60   HGB 12.1   HCT 37.5   MCV 82   MCH 26.3*   MCHC 32.3   RDW 15.2*        Will check NOB labs as well as CMP and Keppra level today    ASSESSMENT:  24 year old  at 10w3d dated by 6w U/S here for NOB visit.    Seen in ED on  for bleeding which has now resolved.    Seizure disorder on Keppra 1000 mg twice daily.  Clinic note from neurology from 2025 reviewed but it does not appear she ever had Keppra level drawn after that appointment so we will draw today and communicate the results and adjust accordingly    PLAN:    1)Prenatal labs reviewed. She has no questions.  2) EDUCATION : RECOMMENDED WEIGHT GAIN: 25-35 lbs given There is no height or weight on file to calculate BMI..   - Instructed on best evidence for: healthy diet and foods to avoid; exercise and activity during pregnancy; and maintenance of a generally healthy lifestyle. Reviewed early pregnancy education, provider coverage, labor and delivery, and prenatal visits.  Discussed the harms, benefits, side effects and alternative therapies for current prescribed and OTC medications.  - recommend PNV  3) Discussed options for screening for chromosomal anomalies, including first screen, noninvasive prenatal testing, CVS/amniocentesis, quad screen, and ultrasound at 18-20 weeks. She is electing noninvasive prenatal testing.  U/S at 20 weeks  4) M referral for L2 U/S given Seizure disorder  5) PNV prescribed    Follow up in 4 weeks. She is encouraged to call sooner with questions or concerns.    Drew Swain MD   Obstetrics and Gynecology

## 2025-07-16 DIAGNOSIS — N76.0 BV (BACTERIAL VAGINOSIS): Primary | ICD-10-CM

## 2025-07-16 DIAGNOSIS — B96.89 BV (BACTERIAL VAGINOSIS): Primary | ICD-10-CM

## 2025-07-16 LAB
BACTERIA UR CULT: NO GROWTH
C TRACH DNA SPEC QL NAA+PROBE: NEGATIVE
N GONORRHOEA DNA SPEC QL NAA+PROBE: NEGATIVE
SPECIMEN TYPE: NORMAL
SPECIMEN TYPE: NORMAL

## 2025-07-16 RX ORDER — METRONIDAZOLE 500 MG/1
500 TABLET ORAL 2 TIMES DAILY
Qty: 14 TABLET | Refills: 0 | Status: SHIPPED | OUTPATIENT
Start: 2025-07-16 | End: 2025-07-23

## 2025-07-28 LAB — SCANNED LAB RESULT: NORMAL
